# Patient Record
Sex: FEMALE | Race: WHITE | Employment: OTHER | URBAN - METROPOLITAN AREA
[De-identification: names, ages, dates, MRNs, and addresses within clinical notes are randomized per-mention and may not be internally consistent; named-entity substitution may affect disease eponyms.]

---

## 2017-01-05 ENCOUNTER — HOSPITAL ENCOUNTER (OUTPATIENT)
Dept: MAMMOGRAPHY | Facility: HOSPITAL | Age: 72
Discharge: HOME | End: 2017-01-05
Attending: INTERNAL MEDICINE
Payer: MEDICARE

## 2017-01-05 DIAGNOSIS — Z12.39 ENCOUNTER FOR SCREENING FOR MALIGNANT NEOPLASM OF BREAST: ICD-10-CM

## 2017-01-05 DIAGNOSIS — Z12.31 VISIT FOR SCREENING MAMMOGRAM: ICD-10-CM

## 2017-01-05 PROCEDURE — 77067 SCR MAMMO BI INCL CAD: CPT

## 2017-02-07 RX ORDER — METOPROLOL TARTRATE 50 MG/1
TABLET, FILM COATED ORAL
Qty: 180 TABLET | Refills: 0 | OUTPATIENT
Start: 2017-02-07

## 2017-02-07 RX ORDER — HYDROCHLOROTHIAZIDE 12.5 MG/1
TABLET ORAL
Qty: 90 TABLET | Refills: 0 | OUTPATIENT
Start: 2017-02-07

## 2017-02-20 ENCOUNTER — PATIENT MESSAGE (OUTPATIENT)
Dept: RHEUMATOLOGY | Facility: CLINIC | Age: 72
End: 2017-02-20

## 2017-02-21 ENCOUNTER — PATIENT MESSAGE (OUTPATIENT)
Dept: INTERNAL MEDICINE CLINIC | Facility: CLINIC | Age: 72
End: 2017-02-21

## 2017-02-21 NOTE — TELEPHONE ENCOUNTER
Please see below and advise. LOV 12/12/16:    Summary:  1. Cont. leflunomide 10mg ad ay    2. Plan to switch to sulfasalazine 1000mg twice a day - in march - will give script to you -    3.  Check labs every 3 months -    4. Return to clinic in 4-6 month

## 2017-02-21 NOTE — TELEPHONE ENCOUNTER
From: Patricia Welch  To: Melinda Hanson MD  Sent: 2/20/2017 5:40 PM CST  Subject: Prescription Question    It's time to order Sulfasalazine for me. You were going to increase the dosage because it's not as strong as the Leflunomide. Thank you.

## 2017-02-22 RX ORDER — SULFASALAZINE 500 MG/1
1000 TABLET ORAL 2 TIMES DAILY
Qty: 360 TABLET | Refills: 1 | Status: SHIPPED | OUTPATIENT
Start: 2017-02-22 | End: 2017-05-23

## 2017-02-24 RX ORDER — HYDROCHLOROTHIAZIDE 12.5 MG/1
TABLET ORAL
Qty: 90 TABLET | Refills: 0 | Status: SHIPPED | OUTPATIENT
Start: 2017-02-24 | End: 2017-06-02

## 2017-02-24 RX ORDER — METOPROLOL TARTRATE 50 MG/1
50 TABLET, FILM COATED ORAL 2 TIMES DAILY
Qty: 180 TABLET | Refills: 0 | Status: SHIPPED | OUTPATIENT
Start: 2017-02-24 | End: 2017-06-02

## 2017-02-24 NOTE — TELEPHONE ENCOUNTER
From: Princess Napier  To:  Eldon Benites MD  Sent: 2/21/2017 5:40 PM CST  Subject: Prescription Question    I received a letter from [facility] denying my 90 day prescription renewals for  Hydrochlorothiazide 12.5 mg tabs  Metformin 500 mg and  Metoprolol T

## 2017-02-24 NOTE — TELEPHONE ENCOUNTER
Hypertensive Medications  Protocol Criteria:  · Appointment scheduled in the past 6 months or in the next 3 months  · BMP or CMP in the past 12 months  · Creatinine result < 2  Recent Visits       Provider Department Primary Dx    2 months ago Alejandro Sheth Lab Results  Component Value Date   CREATSERUM 0.92 12/02/2016   Refilled per office protocol.

## 2017-05-25 ENCOUNTER — OFFICE VISIT (OUTPATIENT)
Dept: RHEUMATOLOGY | Facility: CLINIC | Age: 72
End: 2017-05-25

## 2017-05-25 VITALS
HEART RATE: 79 BPM | DIASTOLIC BLOOD PRESSURE: 81 MMHG | BODY MASS INDEX: 31.63 KG/M2 | SYSTOLIC BLOOD PRESSURE: 155 MMHG | WEIGHT: 146.63 LBS | HEIGHT: 57 IN

## 2017-05-25 DIAGNOSIS — Z51.81 THERAPEUTIC DRUG MONITORING: ICD-10-CM

## 2017-05-25 DIAGNOSIS — M06.9 RHEUMATOID ARTHRITIS INVOLVING MULTIPLE SITES, UNSPECIFIED RHEUMATOID FACTOR PRESENCE: Primary | ICD-10-CM

## 2017-05-25 PROCEDURE — 99214 OFFICE O/P EST MOD 30 MIN: CPT | Performed by: INTERNAL MEDICINE

## 2017-05-25 PROCEDURE — G0463 HOSPITAL OUTPT CLINIC VISIT: HCPCS | Performed by: INTERNAL MEDICINE

## 2017-05-25 RX ORDER — SENNOSIDES 8.6 MG
650 CAPSULE ORAL 3 TIMES DAILY
COMMUNITY
End: 2019-01-14

## 2017-05-25 NOTE — PROGRESS NOTES
Perfecto Trevizo is a 80year old female. HPI:   Patient presents with:  Rheumatoid Arthritis  Neck Pain  Hand Pain  Arm Pain      I had the pleasure of seeing Odilon Oates on 5/25/2017. I had last seen her on 12/12/2016. I had last here on  3/23/2016.  I stiffness. It takes a whiel somtesim. It's not severe. No flares ups. She's been on leflunomide 10mg a day. She may need to switch b/c it will be more expensive. She's doing well. 5/25/2017  She tried ssz - but it was so nauseated with it.  She to Oral Tab CR Take 650 mg by mouth every 8 (eight) hours as needed. Disp:  Rfl:    Metoprolol Tartrate 50 MG Oral Tab Take 1 tablet (50 mg total) by mouth 2 (two) times daily.  Disp: 180 tablet Rfl: 0   MetFORMIN HCl 500 MG Oral Tab Take 2 tablet PO in the am mg/dL  9    CREATININE      0.50-1.50 mg/dL 0.92 0.90 0.90   BUN/CREA RATIO      10.0-20.0 10.9 10.0    ANION GAP      0-18 7 10    CALCIUM      8.5-10.5 mg/dL 8.8 9.1    CALCULATED OSMOLALITY      275-295 mOsm/kg 290 295    AST      15 - 41 U/L  19 19   A HDL      <130 mg/dL   152 (H)   CHOLESTEROL, TOTAL      110-200 mg/dL 219 (H)     Triglycerides      1-149 mg/dL 152 (H)     NON HDL CHOL      <130 mg/dL 157 (H)     LDL Cholesterol Calc      0-99 mg/dL 127 (H)     CREATININE UR RANDOM       197.1     MALB

## 2017-05-25 NOTE — PATIENT INSTRUCTIONS
1. Try sulfasalazine 500mg twice a day x 4 weeks,   2. Check labs in 4-6 weeks before appt. 3. If feeling ok can increase sulfasalazine to 500mg three times a day   4. Return to clinic in 4-6 weeks.    5. Look up hydroxychlroquine 200mg twice a day expens

## 2017-06-02 ENCOUNTER — OFFICE VISIT (OUTPATIENT)
Dept: INTERNAL MEDICINE CLINIC | Facility: CLINIC | Age: 72
End: 2017-06-02

## 2017-06-02 VITALS
HEART RATE: 60 BPM | BODY MASS INDEX: 28.83 KG/M2 | HEIGHT: 59 IN | TEMPERATURE: 98 F | DIASTOLIC BLOOD PRESSURE: 80 MMHG | WEIGHT: 143 LBS | SYSTOLIC BLOOD PRESSURE: 146 MMHG

## 2017-06-02 DIAGNOSIS — Z95.1 S/P CABG X 3: ICD-10-CM

## 2017-06-02 DIAGNOSIS — Z76.89 ENCOUNTER TO ESTABLISH CARE: Primary | ICD-10-CM

## 2017-06-02 DIAGNOSIS — E11.9 DIABETES MELLITUS TYPE 2 WITHOUT RETINOPATHY (HCC): ICD-10-CM

## 2017-06-02 DIAGNOSIS — M06.00 RHEUMATOID ARTHRITIS WITH NEGATIVE RHEUMATOID FACTOR, INVOLVING UNSPECIFIED SITE (HCC): ICD-10-CM

## 2017-06-02 DIAGNOSIS — L30.4 INTERTRIGO: ICD-10-CM

## 2017-06-02 DIAGNOSIS — I10 ESSENTIAL HYPERTENSION: ICD-10-CM

## 2017-06-02 DIAGNOSIS — E78.2 HYPERLIPIDEMIA, MIXED: ICD-10-CM

## 2017-06-02 PROCEDURE — G0009 ADMIN PNEUMOCOCCAL VACCINE: HCPCS | Performed by: INTERNAL MEDICINE

## 2017-06-02 PROCEDURE — 99214 OFFICE O/P EST MOD 30 MIN: CPT | Performed by: INTERNAL MEDICINE

## 2017-06-02 PROCEDURE — G0463 HOSPITAL OUTPT CLINIC VISIT: HCPCS | Performed by: INTERNAL MEDICINE

## 2017-06-02 PROCEDURE — 90670 PCV13 VACCINE IM: CPT | Performed by: INTERNAL MEDICINE

## 2017-06-02 RX ORDER — METOPROLOL TARTRATE 50 MG/1
50 TABLET, FILM COATED ORAL 2 TIMES DAILY
Qty: 180 TABLET | Refills: 3 | Status: SHIPPED | OUTPATIENT
Start: 2017-06-02 | End: 2018-07-05

## 2017-06-02 RX ORDER — SULFASALAZINE 500 MG/1
500 TABLET ORAL 2 TIMES DAILY
Refills: 0 | COMMUNITY
Start: 2017-06-02 | End: 2017-08-08

## 2017-06-02 RX ORDER — HYDROCHLOROTHIAZIDE 12.5 MG/1
TABLET ORAL
Qty: 90 TABLET | Refills: 3 | Status: SHIPPED | OUTPATIENT
Start: 2017-06-02 | End: 2018-05-17

## 2017-06-02 RX ORDER — ACYCLOVIR 400 MG/1
400 TABLET ORAL 3 TIMES DAILY
Qty: 21 TABLET | Refills: 3 | COMMUNITY
Start: 2017-06-02 | End: 2021-09-20 | Stop reason: ALTCHOICE

## 2017-06-02 NOTE — PROGRESS NOTES
Perfecto Trevizo is a 80year old female who presents for     Establish care  Changing from Dr Tito Lora who just retired. Overall feeling ok. Diabetes: home sugars are usually 80s to 130s. HTN; home BPs are not checked. CAD--s/p CABG in 2007.  Faby Hernandez at [facility] for [address] [place] [address].        Allergies:  No Known Allergies    Review of systems:  Constitutional:  No fever, loss of appetite or unintentional weight loss  Respiratory: No cough, wheezing or dyspnea  Cardiac: No chest pain or palpitations  Gastrointestinal: No a sulfasalazine. Healthcare maintenance:  Vaccines-pneumovax 23 was already given by Dr. Haylee Unger per pt, Prevnar today. Shingles vaccine-was in 4/2011. Colonoscopy recom (has never had)--she will see Dr Anne-Marie Solomon who Dr. Haylee Unger recom. Mammo 1/5/17-ok.    dex

## 2017-06-06 ENCOUNTER — TELEPHONE (OUTPATIENT)
Dept: INTERNAL MEDICINE CLINIC | Facility: CLINIC | Age: 72
End: 2017-06-06

## 2017-06-06 NOTE — TELEPHONE ENCOUNTER
DM supply form completed and faxed back to [address] [place] [address] supplies @ [phone], conformation received. Original sent to scan. Received completed form back from [address] [place] [address] supply, called to verify they have everything they need from us.  Per Dea Fleming nothing fur

## 2017-06-06 NOTE — TELEPHONE ENCOUNTER
To nursing,   I completed form completed for [place] medical supply[facility] for diabetes supply. Form is my out pile. Please fax. Thanks.

## 2017-06-07 ENCOUNTER — TELEPHONE (OUTPATIENT)
Dept: INTERNAL MEDICINE CLINIC | Facility: CLINIC | Age: 72
End: 2017-06-07

## 2017-06-07 NOTE — TELEPHONE ENCOUNTER
To nursing,   please clarify to [address] that Dr. Nithya Aguilar has retired and pt has now established here as my pt. (this is a different office than Dr. Henna Winston office). Please cancel her metformin Rx from Dr Nithya Aguilar.    And please use my rx-- Metformin

## 2017-06-07 NOTE — TELEPHONE ENCOUNTER
[facility] and relayed DR. MASSEY message - he verbalized understanding. Already have DR. MASSEY RX and he will cancel RX from DR. Amol Alves

## 2017-06-07 NOTE — TELEPHONE ENCOUNTER
Per [facility] pharmacy fax [phone]  Clarification needed for Rx metformin.  Have active Rx through DR Rachel Gama for 500mg  Dr Marcus Garrett prescribed 1000mg  To Rx and in purple folder

## 2017-06-26 ENCOUNTER — PATIENT MESSAGE (OUTPATIENT)
Dept: RHEUMATOLOGY | Facility: CLINIC | Age: 72
End: 2017-06-26

## 2017-06-26 NOTE — TELEPHONE ENCOUNTER
From: Richie Valle  To: Cathy Thibodeaux MD  Sent: 6/26/2017 1:44 PM CDT  Subject: Prescription Question    I've been taking 4 sulfasalazine pills a day for a couple of weeks now. I still have a lot of pain.  I think I'd like to go back to Leflunomide

## 2017-06-28 RX ORDER — LEFLUNOMIDE 10 MG/1
10 TABLET ORAL DAILY
Qty: 90 TABLET | Refills: 0 | Status: SHIPPED | OUTPATIENT
Start: 2017-06-28 | End: 2017-09-18

## 2017-07-28 ENCOUNTER — LAB ENCOUNTER (OUTPATIENT)
Dept: LAB | Age: 72
End: 2017-07-28
Attending: INTERNAL MEDICINE
Payer: MEDICARE

## 2017-07-28 ENCOUNTER — TELEPHONE (OUTPATIENT)
Dept: INTERNAL MEDICINE CLINIC | Facility: CLINIC | Age: 72
End: 2017-07-28

## 2017-07-28 DIAGNOSIS — D64.9 MILD ANEMIA: ICD-10-CM

## 2017-07-28 DIAGNOSIS — M06.9 RHEUMATOID ARTHRITIS INVOLVING MULTIPLE SITES, UNSPECIFIED RHEUMATOID FACTOR PRESENCE: ICD-10-CM

## 2017-07-28 DIAGNOSIS — R77.1 ELEVATED SERUM GLOBULIN LEVEL: ICD-10-CM

## 2017-07-28 DIAGNOSIS — Z51.81 THERAPEUTIC DRUG MONITORING: ICD-10-CM

## 2017-07-28 DIAGNOSIS — D64.9 MILD ANEMIA: Primary | ICD-10-CM

## 2017-07-28 DIAGNOSIS — E11.9 DIABETES MELLITUS TYPE 2 WITHOUT RETINOPATHY (HCC): ICD-10-CM

## 2017-07-28 LAB
ALBUMIN SERPL BCP-MCNC: 3.2 G/DL (ref 3.5–4.8)
ALBUMIN/GLOB SERPL: 0.7 {RATIO} (ref 1–2)
ALP SERPL-CCNC: 62 U/L (ref 32–100)
ALT SERPL-CCNC: 11 U/L (ref 14–54)
ANION GAP SERPL CALC-SCNC: 11 MMOL/L (ref 0–18)
AST SERPL-CCNC: 17 U/L (ref 15–41)
BACTERIA UR QL AUTO: NEGATIVE /HPF
BASOPHILS # BLD: 0.1 K/UL (ref 0–0.2)
BASOPHILS NFR BLD: 1 %
BILIRUB SERPL-MCNC: 0.4 MG/DL (ref 0.3–1.2)
BILIRUB UR QL: NEGATIVE
BUN SERPL-MCNC: 13 MG/DL (ref 8–20)
BUN/CREAT SERPL: 14.9 (ref 10–20)
CALCIUM SERPL-MCNC: 9 MG/DL (ref 8.5–10.5)
CHLORIDE SERPL-SCNC: 103 MMOL/L (ref 95–110)
CHOLEST SERPL-MCNC: 139 MG/DL (ref 110–200)
CO2 SERPL-SCNC: 26 MMOL/L (ref 22–32)
COLOR UR: YELLOW
CREAT SERPL-MCNC: 0.87 MG/DL (ref 0.5–1.5)
CREAT UR-MCNC: 175.6 MG/DL
CRP SERPL-MCNC: 1.9 MG/DL (ref 0–0.9)
EOSINOPHIL # BLD: 0.2 K/UL (ref 0–0.7)
EOSINOPHIL NFR BLD: 2 %
ERYTHROCYTE [DISTWIDTH] IN BLOOD BY AUTOMATED COUNT: 17.3 % (ref 11–15)
ERYTHROCYTE [SEDIMENTATION RATE] IN BLOOD: 76 MM/HR (ref 0–30)
FERRITIN SERPL IA-MCNC: 15 NG/ML (ref 11–307)
GLOBULIN PLAS-MCNC: 4.4 G/DL (ref 2.5–3.7)
GLUCOSE SERPL-MCNC: 131 MG/DL (ref 70–99)
GLUCOSE UR-MCNC: NEGATIVE MG/DL
HBA1C MFR BLD: 6.6 % (ref 4–6)
HCT VFR BLD AUTO: 33.8 % (ref 35–48)
HDLC SERPL-MCNC: 54 MG/DL
HGB BLD-MCNC: 10.9 G/DL (ref 12–16)
HGB UR QL STRIP.AUTO: NEGATIVE
HYALINE CASTS #/AREA URNS AUTO: 3 /LPF
IRON SATN MFR SERPL: 8 % (ref 15–50)
IRON SERPL-MCNC: 31 MCG/DL (ref 28–170)
KETONES UR-MCNC: NEGATIVE MG/DL
LDLC SERPL CALC-MCNC: 53 MG/DL (ref 0–99)
LYMPHOCYTES # BLD: 2 K/UL (ref 1–4)
LYMPHOCYTES NFR BLD: 22 %
MCH RBC QN AUTO: 26.5 PG (ref 27–32)
MCHC RBC AUTO-ENTMCNC: 32.3 G/DL (ref 32–37)
MCV RBC AUTO: 82 FL (ref 80–100)
MICROALBUMIN UR-MCNC: 4.7 MG/DL (ref 0–1.8)
MICROALBUMIN/CREAT UR: 26.8 MG/G{CREAT} (ref 0–20)
MONOCYTES # BLD: 1 K/UL (ref 0–1)
MONOCYTES NFR BLD: 11 %
NEUTROPHILS # BLD AUTO: 5.9 K/UL (ref 1.8–7.7)
NEUTROPHILS NFR BLD: 65 %
NITRITE UR QL STRIP.AUTO: NEGATIVE
NONHDLC SERPL-MCNC: 85 MG/DL
OSMOLALITY UR CALC.SUM OF ELEC: 292 MOSM/KG (ref 275–295)
PH UR: 5 [PH] (ref 5–8)
PLATELET # BLD AUTO: 404 K/UL (ref 140–400)
PMV BLD AUTO: 8.2 FL (ref 7.4–10.3)
POTASSIUM SERPL-SCNC: 3.9 MMOL/L (ref 3.3–5.1)
PROT SERPL-MCNC: 7.6 G/DL (ref 5.9–8.4)
PROT UR-MCNC: NEGATIVE MG/DL
RBC # BLD AUTO: 4.12 M/UL (ref 3.7–5.4)
RBC #/AREA URNS AUTO: <1 /HPF
SODIUM SERPL-SCNC: 140 MMOL/L (ref 136–144)
SP GR UR STRIP: 1.03 (ref 1–1.03)
TIBC SERPL-MCNC: 374 MCG/DL (ref 228–428)
TRANSFERRIN SERPL-MCNC: 283 MG/DL (ref 192–382)
TRIGL SERPL-MCNC: 161 MG/DL (ref 1–149)
TSH SERPL-ACNC: 2.91 UIU/ML (ref 0.45–5.33)
UROBILINOGEN UR STRIP-ACNC: <2
VIT B12 SERPL-MCNC: 126 PG/ML (ref 181–914)
VIT C UR-MCNC: NEGATIVE MG/DL
WBC # BLD AUTO: 9.2 K/UL (ref 4–11)
WBC #/AREA URNS AUTO: 3 /HPF

## 2017-07-28 PROCEDURE — 86334 IMMUNOFIX E-PHORESIS SERUM: CPT

## 2017-07-28 PROCEDURE — 82043 UR ALBUMIN QUANTITATIVE: CPT

## 2017-07-28 PROCEDURE — 36415 COLL VENOUS BLD VENIPUNCTURE: CPT

## 2017-07-28 PROCEDURE — 82570 ASSAY OF URINE CREATININE: CPT

## 2017-07-28 PROCEDURE — 84165 PROTEIN E-PHORESIS SERUM: CPT

## 2017-07-28 PROCEDURE — 82607 VITAMIN B-12: CPT

## 2017-07-28 PROCEDURE — 84443 ASSAY THYROID STIM HORMONE: CPT

## 2017-07-28 PROCEDURE — 85652 RBC SED RATE AUTOMATED: CPT

## 2017-07-28 PROCEDURE — 86140 C-REACTIVE PROTEIN: CPT

## 2017-07-28 PROCEDURE — 81001 URINALYSIS AUTO W/SCOPE: CPT | Performed by: INTERNAL MEDICINE

## 2017-07-28 PROCEDURE — 85025 COMPLETE CBC W/AUTO DIFF WBC: CPT

## 2017-07-28 PROCEDURE — 80053 COMPREHEN METABOLIC PANEL: CPT

## 2017-07-28 PROCEDURE — 83540 ASSAY OF IRON: CPT

## 2017-07-28 PROCEDURE — 82728 ASSAY OF FERRITIN: CPT

## 2017-07-28 PROCEDURE — 84466 ASSAY OF TRANSFERRIN: CPT

## 2017-07-28 PROCEDURE — 80061 LIPID PANEL: CPT

## 2017-07-28 PROCEDURE — 83036 HEMOGLOBIN GLYCOSYLATED A1C: CPT

## 2017-07-28 NOTE — TELEPHONE ENCOUNTER
To nursing, please tell pt lab done on 7/28/17 CBC CMP TSH lipid UA/MA, D7w---uzrizfc ok except shows a mild chronic anemia with Hgb 10.9 and elevated serum globulin 4.4--may be insignificant.   I added some additional lab to existing specimen in lab-- SPE/

## 2017-08-01 LAB
ALBUMIN SERPL ELPH-MCNC: 3.6 G/DL (ref 3.8–5.8)
ALBUMIN/GLOB SERPL: 0.92 {RATIO} (ref 1–2)
ALPHA1 GLOB SERPL ELPH-MCNC: 0.29 G/DL (ref 0.1–0.3)
ALPHA2 GLOB SERPL ELPH-MCNC: 1.25 G/DL (ref 0.6–1)
B-GLOBULIN SERPL ELPH-MCNC: 1.25 G/DL (ref 0.7–1.3)
GAMMA GLOB SERPL ELPH-MCNC: 1.11 G/DL (ref 0.5–1.7)
TOTAL PROTEIN (SPECIAL TESTING): 7.5 G/DL (ref 6.5–9.1)

## 2017-08-08 ENCOUNTER — OFFICE VISIT (OUTPATIENT)
Dept: INTERNAL MEDICINE CLINIC | Facility: CLINIC | Age: 72
End: 2017-08-08

## 2017-08-08 VITALS
SYSTOLIC BLOOD PRESSURE: 120 MMHG | DIASTOLIC BLOOD PRESSURE: 70 MMHG | WEIGHT: 141 LBS | TEMPERATURE: 98 F | BODY MASS INDEX: 28.43 KG/M2 | HEIGHT: 59 IN | HEART RATE: 68 BPM

## 2017-08-08 DIAGNOSIS — E61.1 IRON DEFICIENCY: ICD-10-CM

## 2017-08-08 DIAGNOSIS — E78.2 HYPERLIPIDEMIA, MIXED: ICD-10-CM

## 2017-08-08 DIAGNOSIS — E53.8 VITAMIN B12 DEFICIENCY: ICD-10-CM

## 2017-08-08 DIAGNOSIS — R77.1 ELEVATED SERUM GLOBULIN LEVEL: ICD-10-CM

## 2017-08-08 DIAGNOSIS — D64.9 MILD ANEMIA: Primary | ICD-10-CM

## 2017-08-08 DIAGNOSIS — E11.9 DIABETES MELLITUS TYPE 2 WITHOUT RETINOPATHY (HCC): ICD-10-CM

## 2017-08-08 DIAGNOSIS — I10 ESSENTIAL HYPERTENSION: ICD-10-CM

## 2017-08-08 PROCEDURE — G0463 HOSPITAL OUTPT CLINIC VISIT: HCPCS | Performed by: INTERNAL MEDICINE

## 2017-08-08 PROCEDURE — 96372 THER/PROPH/DIAG INJ SC/IM: CPT | Performed by: INTERNAL MEDICINE

## 2017-08-08 PROCEDURE — 99214 OFFICE O/P EST MOD 30 MIN: CPT | Performed by: INTERNAL MEDICINE

## 2017-08-08 RX ORDER — GLIMEPIRIDE 2 MG/1
1 TABLET ORAL
Qty: 90 TABLET | Refills: 3 | COMMUNITY
Start: 2017-08-08 | End: 2017-12-18

## 2017-08-08 RX ORDER — FERROUS SULFATE 325(65) MG
325 TABLET ORAL DAILY
Qty: 1 TABLET | Refills: 0 | COMMUNITY
Start: 2017-08-08 | End: 2017-09-12

## 2017-08-08 RX ORDER — CYANOCOBALAMIN 1000 UG/ML
1000 INJECTION INTRAMUSCULAR; SUBCUTANEOUS ONCE
Status: COMPLETED | OUTPATIENT
Start: 2017-08-08 | End: 2017-08-08

## 2017-08-08 RX ORDER — CYANOCOBALAMIN 1000 UG/ML
1000 INJECTION INTRAMUSCULAR; SUBCUTANEOUS WEEKLY
Qty: 1 VIAL | Refills: 0 | COMMUNITY
Start: 2017-08-08 | End: 2017-08-23

## 2017-08-08 RX ADMIN — CYANOCOBALAMIN 1000 MCG: 1000 INJECTION INTRAMUSCULAR; SUBCUTANEOUS at 12:25:00

## 2017-08-08 NOTE — PROGRESS NOTES
Danis Holguin is a 80year old female who presents for     Check at 2 mo.     Diabetes: home sugars 80s last few days--can be up to 120. Had 64 a few d ago. Did ok with cutting back metformin 1000 mg bid as instructed at June visit. A1c 6.6 on 7/28/17. Relation Age of Onset   • Macular degeneration Mother    •  age 80 old age [Other] [OTHER] Mother    • Heart Disease Father 67     CAD; Cause of death   • Stroke Sister 46     CVA   • Cancer Maternal Aunt [de-identified]     pancreatic   • 1 brother, 2 sisters.  [Ot Lab 7/28/17-Hgb 10.9, Fe sat low at 8% with low nl ferritin of 15. (hgb was 10.3 on 3/10/16). Vit B12 level low at 126. I recom proceed with colonoscopy with Dr Flaco Singh and see her for anemia. Add Rx ferrous sulfate 65 mg daily.   Vit B12 1000 mcg IM t (10 mg total) by mouth daily. Disp: 90 tablet Rfl: 0   MetFORMIN HCl 1000 MG Oral Tab Take 1 tablet (1,000 mg total) by mouth 2 (two) times daily with meals.  Disp: 180 tablet Rfl: 3   hydrochlorothiazide 12.5 MG Oral Tab Take 1 tablet by mouth  daily Disp:

## 2017-08-15 ENCOUNTER — NURSE ONLY (OUTPATIENT)
Dept: INTERNAL MEDICINE CLINIC | Facility: CLINIC | Age: 72
End: 2017-08-15

## 2017-08-15 DIAGNOSIS — D64.9 ANEMIA, UNSPECIFIED TYPE: Primary | ICD-10-CM

## 2017-08-15 PROCEDURE — 96372 THER/PROPH/DIAG INJ SC/IM: CPT | Performed by: INTERNAL MEDICINE

## 2017-08-15 RX ORDER — CYANOCOBALAMIN 1000 UG/ML
1000 INJECTION INTRAMUSCULAR; SUBCUTANEOUS ONCE
Status: COMPLETED | OUTPATIENT
Start: 2017-08-15 | End: 2017-08-15

## 2017-08-15 RX ADMIN — CYANOCOBALAMIN 1000 MCG: 1000 INJECTION INTRAMUSCULAR; SUBCUTANEOUS at 13:58:00

## 2017-08-22 ENCOUNTER — NURSE ONLY (OUTPATIENT)
Dept: INTERNAL MEDICINE CLINIC | Facility: CLINIC | Age: 72
End: 2017-08-22

## 2017-08-22 DIAGNOSIS — E53.8 B12 DEFICIENCY: Primary | ICD-10-CM

## 2017-08-22 PROCEDURE — 96372 THER/PROPH/DIAG INJ SC/IM: CPT | Performed by: INTERNAL MEDICINE

## 2017-08-22 RX ORDER — CYANOCOBALAMIN 1000 UG/ML
1000 INJECTION INTRAMUSCULAR; SUBCUTANEOUS ONCE
Status: COMPLETED | OUTPATIENT
Start: 2017-08-22 | End: 2017-08-22

## 2017-08-22 RX ADMIN — CYANOCOBALAMIN 1000 MCG: 1000 INJECTION INTRAMUSCULAR; SUBCUTANEOUS at 11:21:00

## 2017-08-23 ENCOUNTER — TELEPHONE (OUTPATIENT)
Dept: GASTROENTEROLOGY | Facility: CLINIC | Age: 72
End: 2017-08-23

## 2017-08-23 ENCOUNTER — OFFICE VISIT (OUTPATIENT)
Dept: GASTROENTEROLOGY | Facility: CLINIC | Age: 72
End: 2017-08-23

## 2017-08-23 VITALS
HEART RATE: 88 BPM | DIASTOLIC BLOOD PRESSURE: 76 MMHG | SYSTOLIC BLOOD PRESSURE: 134 MMHG | HEIGHT: 59 IN | TEMPERATURE: 98 F | WEIGHT: 141.81 LBS | BODY MASS INDEX: 28.59 KG/M2 | OXYGEN SATURATION: 93 %

## 2017-08-23 DIAGNOSIS — D64.9 ANEMIA, UNSPECIFIED TYPE: Primary | ICD-10-CM

## 2017-08-23 PROCEDURE — G0463 HOSPITAL OUTPT CLINIC VISIT: HCPCS | Performed by: PHYSICIAN ASSISTANT

## 2017-08-23 PROCEDURE — 99204 OFFICE O/P NEW MOD 45 MIN: CPT | Performed by: PHYSICIAN ASSISTANT

## 2017-08-23 RX ORDER — OMEPRAZOLE 40 MG/1
40 CAPSULE, DELAYED RELEASE ORAL DAILY
Qty: 90 CAPSULE | Refills: 0 | Status: SHIPPED | OUTPATIENT
Start: 2017-08-23 | End: 2017-08-23 | Stop reason: RX

## 2017-08-23 RX ORDER — OMEPRAZOLE 40 MG/1
40 CAPSULE, DELAYED RELEASE ORAL DAILY
Qty: 90 CAPSULE | Refills: 0 | Status: SHIPPED | OUTPATIENT
Start: 2017-08-23 | End: 2017-09-12

## 2017-08-23 NOTE — TELEPHONE ENCOUNTER
Colleen Conn already called [facility] mail order pharmacy and spoke to [place] to cancel colyte and omeprazole. Could you please send to [place] per Martin Letters request below.  Silvanayulimaura Juan Miguel has not even received colyte rx yet so we need to make sure it's sent to co

## 2017-08-23 NOTE — TELEPHONE ENCOUNTER
Resolved. Thank you! Jasiel Skelton, if everything has gone through, would you sign the encounter/close it? Or re-route and I will - did not want to close preemptively.

## 2017-08-23 NOTE — TELEPHONE ENCOUNTER
Pt requesting RN to send Omeprazole and preps rx for CLN to [facility] on [address]. Pls call. Thank you.

## 2017-08-23 NOTE — TELEPHONE ENCOUNTER
PLEASE DO NOT CLOSE ENC    These Rx's were sent in error to mailorder. Dottyverona Randolph stated they would cancel omeprazole but they did not receive the bowel prep yet. Therefore could not leave a note or cancel. We would have to call back.      I called pharm

## 2017-08-23 NOTE — H&P
[address] Gastroenterology                                                                                                  Clinic History and Physical     Pa Lives with    - Daily NSAIDs and 325 ASA    History, Medications, Allergies, ROS:      Past Medical History:   Diagnosis Date   • Amblyopia 1951    OD   • Anemia 07/2017    mixed iron and B12 deficiency   • CAD (coronary artery disease) 2007    acut tablet Rfl: 3   Ferrous Sulfate 325 (65 Fe) MG Oral Tab Take 1 tablet (325 mg total) by mouth daily. Disp: 1 tablet Rfl: 0   leflunomide 10 MG Oral Tab Take 1 tablet (10 mg total) by mouth daily.  Disp: 90 tablet Rfl: 0   MetFORMIN HCl 1000 MG Oral Tab Take comfortable and in no acute discomfort  HEENT: conjunctiva pink, the sclera appears anicteric, oropharynx clear, mucus membranes appear moist  CV: regular rate and rhythm, the extremities are warm and well perfused   Chest: moves air well; no labored breat pain, weight loss, et Haritha El. No red flag symptoms presently. Works out at [employer] and does [place] three times a week. #Hx CABG: no other blood thinners/anti-platelets/anti-coagulants other than   #Moderate NSAID use:  May discuss with PC discussed the risks, benefits, and alternatives to upper endoscopy/enteroscopy with the patient [who demonstrated understanding], including but not limited to the risks of bleeding, infection, pain, as well as the risks of anesthesia and perforation all le

## 2017-08-23 NOTE — TELEPHONE ENCOUNTER
Scheduled for:  Colonoscopy - [address], EGD - [address]  Provider Name:  Dr. Cheri Mcnally  Date:  8/29/17  Location:  [facility]  Sedation:  MAC  Time:  1:15 pm (pt is aware to arrive at 12:15 pm)  Prep:  Colyte, prep instructions were given to pt in the office, pt verbali

## 2017-08-24 NOTE — TELEPHONE ENCOUNTER
Left msg for pt letting her know that her procedure on 8/29/17 with Dr. Isaac Johnosn was moved to 1:15 pm instead of 1:00 pm, also told pt to arrive at 12:15 pm.

## 2017-08-29 ENCOUNTER — ANESTHESIA (OUTPATIENT)
Dept: ENDOSCOPY | Facility: HOSPITAL | Age: 72
End: 2017-08-29
Payer: MEDICARE

## 2017-08-29 ENCOUNTER — SURGERY (OUTPATIENT)
Age: 72
End: 2017-08-29

## 2017-08-29 ENCOUNTER — ANESTHESIA EVENT (OUTPATIENT)
Dept: ENDOSCOPY | Facility: HOSPITAL | Age: 72
End: 2017-08-29
Payer: MEDICARE

## 2017-08-29 ENCOUNTER — HOSPITAL ENCOUNTER (OUTPATIENT)
Facility: HOSPITAL | Age: 72
Setting detail: HOSPITAL OUTPATIENT SURGERY
Discharge: HOME | End: 2017-08-29
Attending: INTERNAL MEDICINE | Admitting: INTERNAL MEDICINE
Payer: MEDICARE

## 2017-08-29 DIAGNOSIS — D64.9 ANEMIA, UNSPECIFIED TYPE: Primary | ICD-10-CM

## 2017-08-29 PROCEDURE — G0121 COLON CA SCRN NOT HI RSK IND: HCPCS | Performed by: INTERNAL MEDICINE

## 2017-08-29 PROCEDURE — 0DJD8ZZ INSPECTION OF LOWER INTESTINAL TRACT, VIA NATURAL OR ARTIFICIAL OPENING ENDOSCOPIC: ICD-10-PCS | Performed by: INTERNAL MEDICINE

## 2017-08-29 PROCEDURE — 43239 EGD BIOPSY SINGLE/MULTIPLE: CPT | Performed by: INTERNAL MEDICINE

## 2017-08-29 PROCEDURE — 0DB98ZX EXCISION OF DUODENUM, VIA NATURAL OR ARTIFICIAL OPENING ENDOSCOPIC, DIAGNOSTIC: ICD-10-PCS | Performed by: INTERNAL MEDICINE

## 2017-08-29 PROCEDURE — 0DB68ZX EXCISION OF STOMACH, VIA NATURAL OR ARTIFICIAL OPENING ENDOSCOPIC, DIAGNOSTIC: ICD-10-PCS | Performed by: INTERNAL MEDICINE

## 2017-08-29 RX ORDER — NALOXONE HYDROCHLORIDE 0.4 MG/ML
80 INJECTION, SOLUTION INTRAMUSCULAR; INTRAVENOUS; SUBCUTANEOUS AS NEEDED
Status: DISCONTINUED | OUTPATIENT
Start: 2017-08-29 | End: 2017-08-29

## 2017-08-29 RX ORDER — SODIUM CHLORIDE, SODIUM LACTATE, POTASSIUM CHLORIDE, CALCIUM CHLORIDE 600; 310; 30; 20 MG/100ML; MG/100ML; MG/100ML; MG/100ML
INJECTION, SOLUTION INTRAVENOUS CONTINUOUS PRN
Status: DISCONTINUED | OUTPATIENT
Start: 2017-08-29 | End: 2017-08-29 | Stop reason: SURG

## 2017-08-29 RX ORDER — ONDANSETRON 2 MG/ML
4 INJECTION INTRAMUSCULAR; INTRAVENOUS ONCE AS NEEDED
Status: DISCONTINUED | OUTPATIENT
Start: 2017-08-29 | End: 2017-08-29

## 2017-08-29 RX ORDER — SODIUM CHLORIDE, SODIUM LACTATE, POTASSIUM CHLORIDE, CALCIUM CHLORIDE 600; 310; 30; 20 MG/100ML; MG/100ML; MG/100ML; MG/100ML
INJECTION, SOLUTION INTRAVENOUS CONTINUOUS
Status: DISCONTINUED | OUTPATIENT
Start: 2017-08-29 | End: 2017-08-29

## 2017-08-29 RX ORDER — LIDOCAINE HYDROCHLORIDE 10 MG/ML
INJECTION, SOLUTION EPIDURAL; INFILTRATION; INTRACAUDAL; PERINEURAL AS NEEDED
Status: DISCONTINUED | OUTPATIENT
Start: 2017-08-29 | End: 2017-08-29 | Stop reason: SURG

## 2017-08-29 RX ADMIN — LIDOCAINE HYDROCHLORIDE 50 MG: 10 INJECTION, SOLUTION EPIDURAL; INFILTRATION; INTRACAUDAL; PERINEURAL at 13:32:00

## 2017-08-29 RX ADMIN — SODIUM CHLORIDE, SODIUM LACTATE, POTASSIUM CHLORIDE, CALCIUM CHLORIDE: 600; 310; 30; 20 INJECTION, SOLUTION INTRAVENOUS at 14:20:00

## 2017-08-29 RX ADMIN — SODIUM CHLORIDE, SODIUM LACTATE, POTASSIUM CHLORIDE, CALCIUM CHLORIDE: 600; 310; 30; 20 INJECTION, SOLUTION INTRAVENOUS at 13:32:00

## 2017-08-29 NOTE — ANESTHESIA POSTPROCEDURE EVALUATION
Patient: Olin Barthel    Procedure Summary     Date:  08/29/17 Room / Location:  [address] [place] [address] ENDOSCOPY 05 / [address] [place] [address] ENDOSCOPY    Anesthesia Start:  2846 Anesthesia Stop:  4032    Procedures:       COLONOSCOPY (N/A )      ESOPHAGOGASTRODUODENOSCOPY (EGD) (N/A ) Diagn

## 2017-08-29 NOTE — ANESTHESIA PREPROCEDURE EVALUATION
Anesthesia PreOp Note    HPI:     Gopi Crandall is a 71year old female who presents for preoperative consultation requested by: Elma Archibald MD    Date of Surgery: 8/29/2017    Procedure(s):  COLONOSCOPY  ESOPHAGOGASTRODUODENOSCOPY (EGD)  Indicati Marie Zhang      Prescriptions Prior to Admission:  Omeprazole 40 MG Oral Capsule Delayed Release Take 1 capsule (40 mg total) by mouth daily.  Take 1 capsule by mouth daily before breakfast. Disp: 90 capsule Rfl: 0 8/29/2017   PEG 3350-KCl-NaBcb-NaCl-Kiarra AppleC Cause of death   • Cancer Maternal Aunt [de-identified]     pancreatic   • Macular degeneration Mother    •  age 80 old age Sharla Paiz Mother    • Stroke Sister 46     CVA   • 1 brother, 2 sisters.  [OTHER] Other    • sciatica [OTHER] Brother    • 2 sons, 1 daughter [O exam  (+) hypertension, CAD, CABG/stent,     Neuro/Psych - negative ROS     GI/Hepatic/Renal      Endo/Other    (+) diabetes mellitus type 2 well controlled,   Abdominal  - normal exam             Anesthesia Plan:   ASA:  3  Plan:   MAC  Post-op Pain Manag

## 2017-08-29 NOTE — H&P
History & Physical Examination    Patient Name: Patricia Welch  MRN: [id number]  [facility]: [id number]  YOB: 1945    Diagnosis: iron deficiency anemia      Prescriptions Prior to Admission:  Omeprazole 40 MG Oral Capsule Delayed Release Take 1 caps History:   Diagnosis Date   • Amblyopia 1951    OD   • Anemia 07/2017    mixed iron and B12 deficiency   • CAD (coronary artery disease) 2007    acute MI and had CABG in 3/2007    • Cataracts, bilateral 2004    OU; sees Dr. Minnie Choudhury   • Coronary atheroscle changes noted above.     Mariola Haro MD  [facility], [facility] - Gastroenterology  8/29/2017  1:05 PM

## 2017-08-29 NOTE — OPERATIVE REPORT
Esophagogastroduodenoscopy (EGD) & Colonoscopy Report    Rusty Caballero     1945 Age 71year old   PCP Rina Candelaria MD Endoscopist Tayla Melissa MD     Date of procedure: 17    Procedure: EGD w/ biopsy single/multiple & Colonoscopy appendix and the ileocecal valve. Withdrawal was begun with thorough washing and careful examination of the colonic walls and folds. Photodocumentation was obtained. The bowel prep was good. Views of the colon were good with washing.  Withdrawal time was 13 medications    >>>If biopsies were performed and you have not received your pathology results either by phone or letter within 2 weeks, please call our office at [phone].       Annie Schulz MD  [facility], [facility] - Gastroenterology  8/29/2017  2:09

## 2017-08-30 ENCOUNTER — TELEPHONE (OUTPATIENT)
Dept: GASTROENTEROLOGY | Facility: CLINIC | Age: 72
End: 2017-08-30

## 2017-08-30 VITALS
WEIGHT: 141 LBS | HEART RATE: 74 BPM | RESPIRATION RATE: 14 BRPM | HEIGHT: 59 IN | BODY MASS INDEX: 28.43 KG/M2 | OXYGEN SATURATION: 96 % | DIASTOLIC BLOOD PRESSURE: 92 MMHG | SYSTOLIC BLOOD PRESSURE: 131 MMHG

## 2017-08-30 RX ORDER — PEG-3350, SODIUM SULFATE, SODIUM CHLORIDE, POTASSIUM CHLORIDE, SODIUM ASCORBATE AND ASCORBIC ACID 7.5-2.691G
KIT ORAL
Qty: 1 EACH | Refills: 0 | Status: SHIPPED | OUTPATIENT
Start: 2017-08-30 | End: 2017-08-31

## 2017-08-30 NOTE — TELEPHONE ENCOUNTER
Called patient and discussed results of EGD which showed unremarkable gastric and duodenal biopsies. Given normal EGD and colonoscopy discussed plan for video capsule endoscopy to evaluate a small bowel source of iron deficiency anemia.     Discussed our

## 2017-08-31 ENCOUNTER — TELEPHONE (OUTPATIENT)
Dept: GASTROENTEROLOGY | Facility: CLINIC | Age: 72
End: 2017-08-31

## 2017-08-31 RX ORDER — POLYETHYLENE GLYCOL 3350, SODIUM CHLORIDE, SODIUM BICARBONATE, POTASSIUM CHLORIDE 420; 11.2; 5.72; 1.48 G/4L; G/4L; G/4L; G/4L
2 POWDER, FOR SOLUTION ORAL ONCE
Qty: 1 BOTTLE | Refills: 0 | Status: SHIPPED | OUTPATIENT
Start: 2017-08-31 | End: 2017-08-31

## 2017-08-31 NOTE — TELEPHONE ENCOUNTER
Pt notified that less expensive RX has been sent to her pharmacy. I reviewed she is ONLY to drink 2L the evening before her procedure, she verbalized understanding.

## 2017-09-05 ENCOUNTER — TELEPHONE (OUTPATIENT)
Dept: GASTROENTEROLOGY | Facility: CLINIC | Age: 72
End: 2017-09-05

## 2017-09-05 ENCOUNTER — PATIENT MESSAGE (OUTPATIENT)
Dept: GASTROENTEROLOGY | Facility: CLINIC | Age: 72
End: 2017-09-05

## 2017-09-05 DIAGNOSIS — D64.9 ANEMIA, UNSPECIFIED TYPE: Primary | ICD-10-CM

## 2017-09-05 NOTE — TELEPHONE ENCOUNTER
Pt contacted and reviewed Dr. Birgit Sosa message below, she verbalized understanding. She rescheduled f/u with PB for after VCE for 9/21/17 @ 9:30am. VCE instructions sent to pt via [place].

## 2017-09-05 NOTE — TELEPHONE ENCOUNTER
BRIAN Peguerosherin Garzastefania   MRN: [id number]   Procedure(s):   CAPSULE ENDOSCOPY   Date: 9/11/2017   Time (includes any set-up time): 0730     Surgeon Start Time:   7:30 AM   Location: [facility] ENDOSCOPY  - [facility] ENDOSCOPY [address]

## 2017-09-05 NOTE — TELEPHONE ENCOUNTER
From: Chidi Neff  To: Chaitanya Nolasco  Sent: 9/5/2017 11:16 AM CDT  Subject: Visit Follow-up Question    I have an appt. with you Thursday 9/7 following my colonoscopy/endoscopy of 8/29.  Dr. Gallo Player didn't find anything and wants to schedule a came

## 2017-09-05 NOTE — TELEPHONE ENCOUNTER
Patient should hold her metformin and glimepiride the day before and day of the procedure and reschedule her follow up visit until after the VCE.     Thank you,    Dr. Louise Duarte

## 2017-09-05 NOTE — TELEPHONE ENCOUNTER
Contacted Siri Trent in endo and she states that VCE could be scheduled on 9/11 or 9/12. Contacted pt and she accepted appt for 9/11/17 with arrival time of 6:30am, electronic case request sent to endo.  I reviewed the prep instructions w/ her over the phone

## 2017-09-05 NOTE — TELEPHONE ENCOUNTER
Pt indicates she had both cln & egd last Tues and was advised by Dr. Ariel Tripathi to have another egd/procedure done. Pt was unsure if she had to call to request appt? Pt also wants you to know that she has a f/up appt on 9/7, should she keep that appt?  Pls call at:

## 2017-09-11 ENCOUNTER — SURGERY (OUTPATIENT)
Age: 72
End: 2017-09-11

## 2017-09-11 ENCOUNTER — HOSPITAL ENCOUNTER (OUTPATIENT)
Facility: HOSPITAL | Age: 72
Setting detail: HOSPITAL OUTPATIENT SURGERY
Discharge: HOME | End: 2017-09-11
Attending: INTERNAL MEDICINE | Admitting: INTERNAL MEDICINE
Payer: MEDICARE

## 2017-09-11 VITALS
BODY MASS INDEX: 28.22 KG/M2 | HEART RATE: 116 BPM | OXYGEN SATURATION: 95 % | DIASTOLIC BLOOD PRESSURE: 91 MMHG | SYSTOLIC BLOOD PRESSURE: 161 MMHG | HEIGHT: 59 IN | RESPIRATION RATE: 20 BRPM | WEIGHT: 140 LBS

## 2017-09-11 DIAGNOSIS — D64.9 ANEMIA, UNSPECIFIED TYPE: ICD-10-CM

## 2017-09-11 PROCEDURE — 0DJ07ZZ INSPECTION OF UPPER INTESTINAL TRACT, VIA NATURAL OR ARTIFICIAL OPENING: ICD-10-PCS | Performed by: INTERNAL MEDICINE

## 2017-09-12 ENCOUNTER — LAB ENCOUNTER (OUTPATIENT)
Dept: LAB | Age: 72
End: 2017-09-12
Attending: INTERNAL MEDICINE
Payer: MEDICARE

## 2017-09-12 ENCOUNTER — TELEPHONE (OUTPATIENT)
Dept: GASTROENTEROLOGY | Facility: CLINIC | Age: 72
End: 2017-09-12

## 2017-09-12 ENCOUNTER — OFFICE VISIT (OUTPATIENT)
Dept: INTERNAL MEDICINE CLINIC | Facility: CLINIC | Age: 72
End: 2017-09-12

## 2017-09-12 VITALS
WEIGHT: 140 LBS | HEART RATE: 80 BPM | TEMPERATURE: 98 F | DIASTOLIC BLOOD PRESSURE: 72 MMHG | SYSTOLIC BLOOD PRESSURE: 134 MMHG | BODY MASS INDEX: 28.22 KG/M2 | HEIGHT: 59 IN

## 2017-09-12 DIAGNOSIS — D64.9 MILD ANEMIA: ICD-10-CM

## 2017-09-12 DIAGNOSIS — D50.9 IRON DEFICIENCY ANEMIA, UNSPECIFIED IRON DEFICIENCY ANEMIA TYPE: Primary | ICD-10-CM

## 2017-09-12 DIAGNOSIS — E61.1 IRON DEFICIENCY: ICD-10-CM

## 2017-09-12 DIAGNOSIS — D64.9 MILD ANEMIA: Primary | ICD-10-CM

## 2017-09-12 DIAGNOSIS — E53.8 B12 DEFICIENCY: ICD-10-CM

## 2017-09-12 DIAGNOSIS — E11.9 DIABETES MELLITUS TYPE 2 WITHOUT RETINOPATHY (HCC): ICD-10-CM

## 2017-09-12 DIAGNOSIS — M06.9 RHEUMATOID ARTHRITIS INVOLVING MULTIPLE SITES, UNSPECIFIED RHEUMATOID FACTOR PRESENCE: ICD-10-CM

## 2017-09-12 DIAGNOSIS — Z51.81 THERAPEUTIC DRUG MONITORING: ICD-10-CM

## 2017-09-12 LAB
ALBUMIN SERPL BCP-MCNC: 3.3 G/DL (ref 3.5–4.8)
ALT SERPL-CCNC: 12 U/L (ref 14–54)
AST SERPL-CCNC: 22 U/L (ref 15–41)
BASOPHILS # BLD: 0.1 K/UL (ref 0–0.2)
BASOPHILS NFR BLD: 1 %
CREAT SERPL-MCNC: 1.23 MG/DL (ref 0.5–1.5)
CRP SERPL-MCNC: 2.9 MG/DL (ref 0–0.9)
EOSINOPHIL # BLD: 0.2 K/UL (ref 0–0.7)
EOSINOPHIL NFR BLD: 2 %
ERYTHROCYTE [DISTWIDTH] IN BLOOD BY AUTOMATED COUNT: 15.7 % (ref 11–15)
ERYTHROCYTE [SEDIMENTATION RATE] IN BLOOD: 70 MM/HR (ref 0–30)
HCT VFR BLD AUTO: 33.2 % (ref 35–48)
HGB BLD-MCNC: 10.8 G/DL (ref 12–16)
LYMPHOCYTES # BLD: 2.4 K/UL (ref 1–4)
LYMPHOCYTES NFR BLD: 25 %
MCH RBC QN AUTO: 26.2 PG (ref 27–32)
MCHC RBC AUTO-ENTMCNC: 32.4 G/DL (ref 32–37)
MCV RBC AUTO: 80.9 FL (ref 80–100)
MONOCYTES # BLD: 1.2 K/UL (ref 0–1)
MONOCYTES NFR BLD: 13 %
NEUTROPHILS # BLD AUTO: 5.7 K/UL (ref 1.8–7.7)
NEUTROPHILS NFR BLD: 59 %
PLATELET # BLD AUTO: 392 K/UL (ref 140–400)
PMV BLD AUTO: 8.4 FL (ref 7.4–10.3)
RBC # BLD AUTO: 4.11 M/UL (ref 3.7–5.4)
WBC # BLD AUTO: 9.6 K/UL (ref 4–11)

## 2017-09-12 PROCEDURE — 91110 GI TRC IMG INTRAL ESOPH-ILE: CPT | Performed by: INTERNAL MEDICINE

## 2017-09-12 PROCEDURE — 84460 ALANINE AMINO (ALT) (SGPT): CPT

## 2017-09-12 PROCEDURE — 85652 RBC SED RATE AUTOMATED: CPT

## 2017-09-12 PROCEDURE — G0463 HOSPITAL OUTPT CLINIC VISIT: HCPCS | Performed by: INTERNAL MEDICINE

## 2017-09-12 PROCEDURE — G0008 ADMIN INFLUENZA VIRUS VAC: HCPCS | Performed by: INTERNAL MEDICINE

## 2017-09-12 PROCEDURE — 36415 COLL VENOUS BLD VENIPUNCTURE: CPT

## 2017-09-12 PROCEDURE — 85025 COMPLETE CBC W/AUTO DIFF WBC: CPT

## 2017-09-12 PROCEDURE — 84450 TRANSFERASE (AST) (SGOT): CPT

## 2017-09-12 PROCEDURE — 86140 C-REACTIVE PROTEIN: CPT

## 2017-09-12 PROCEDURE — 82040 ASSAY OF SERUM ALBUMIN: CPT

## 2017-09-12 PROCEDURE — 82565 ASSAY OF CREATININE: CPT

## 2017-09-12 PROCEDURE — 99214 OFFICE O/P EST MOD 30 MIN: CPT | Performed by: INTERNAL MEDICINE

## 2017-09-12 PROCEDURE — 90653 IIV ADJUVANT VACCINE IM: CPT | Performed by: INTERNAL MEDICINE

## 2017-09-12 PROCEDURE — 96372 THER/PROPH/DIAG INJ SC/IM: CPT | Performed by: INTERNAL MEDICINE

## 2017-09-12 RX ORDER — CYANOCOBALAMIN 1000 UG/ML
1000 INJECTION INTRAMUSCULAR; SUBCUTANEOUS
Status: DISCONTINUED | OUTPATIENT
Start: 2017-09-12 | End: 2020-10-16

## 2017-09-12 RX ORDER — FERROUS SULFATE 325(65) MG
325 TABLET ORAL 2 TIMES DAILY
Refills: 0 | COMMUNITY
Start: 2017-09-12 | End: 2018-03-19

## 2017-09-12 RX ADMIN — CYANOCOBALAMIN 1000 MCG: 1000 INJECTION INTRAMUSCULAR; SUBCUTANEOUS at 15:11:00

## 2017-09-12 NOTE — OPERATIVE REPORT
VIDEO CAPSULE ENDOSCOPY REPORT    Patient: Verona Duque   : 1945    Procedure: small bowel capsule endoscopy    Pre-operative diagnosis: iron deficiency anemia    Post-operative diagnosis: normal small bowel capsule endoscopy    Medications given

## 2017-09-12 NOTE — H&P
History & Physical Examination    Patient Name: Sandie Oleary  MRN: [id number]  [facility]: [id number]  YOB: 1945    Diagnosis: iron deficiency anemia      No prescriptions prior to admission.     No current facility-administered medications for [facility] 16.00 Years     Types: Cigarettes    Quit date: 1/1/1975    Smokeless tobacco: Former User    Alcohol use No       SYSTEM Check if Physical Exam is Normal If not normal, please explain:   VANNESA Turk  [ Sil Kauffman [ Kartik Blackburn [ Sheng Auguste [ Regina Agarwal

## 2017-09-12 NOTE — TELEPHONE ENCOUNTER
Called patient to discuss results of video capsule endoscopy which was unremarkable. Discussed this with her and recommendation to start oral iron and referral to hematology for further evaluation, which she understands.     RN Staff: Please call patient

## 2017-09-12 NOTE — PROGRESS NOTES
Ellie Deleon is a 71year old female who presents for     1 month check    Anemia--  Had EGD and colonoscopy Dr Saloni Marin 8/29/17-was neg exc small non-bleeding int hemorrhoids. Had neg video SB capsule endoscopy 9/11/17.  He recom she go to hematologist. Claudeen Reiter, MD;  Location: [facility]   Family History   Problem Relation Age of Onset   • Heart Disease Father 67     CAD; Cause of death   • Cancer Maternal Aunt [de-identified]     pancreatic   • Macular degeneration Mother    •  age 80 old age Sharla Paiz Mother today. Had EGD and colonoscopy Dr Doris Olson 8/29/17-was neg exc small non-bleeding internal hemorrhoids. Had neg video SB capsule endoscopy 9/11/17. He recom she go to hematologist.   Refer to Dr. Randy Ayala.    check CBC.      Diabetes mellitus type 2 withou Rfl: 3   acyclovir 400 MG Oral Tab Take 1 tablet (400 mg total) by mouth 3 (three) times daily. As needed for cold sores Disp: 21 tablet Rfl: 3   Acetaminophen  MG Oral Tab CR Take 650 mg by mouth every 8 (eight) hours as needed.  Disp:  Rfl:    aspir

## 2017-09-12 NOTE — TELEPHONE ENCOUNTER
Referral entered for hematology. Pt contacted and provided her the number to call to schedule her appt at [phone], she verbalized understanding. No further questions at this time.

## 2017-09-13 ENCOUNTER — TELEPHONE (OUTPATIENT)
Dept: INTERNAL MEDICINE CLINIC | Facility: CLINIC | Age: 72
End: 2017-09-13

## 2017-09-13 NOTE — TELEPHONE ENCOUNTER
To nursing,   please tell patient the lab done on 9/12/17–CBC–results show her anemia is about the same as it was in July. Her hemoglobin is 10.8 on this lab compared to 10.9 on 7/28/17.     Go ahead and see the hematologist as we discussed at her visit

## 2017-09-18 ENCOUNTER — PATIENT MESSAGE (OUTPATIENT)
Dept: RHEUMATOLOGY | Facility: CLINIC | Age: 72
End: 2017-09-18

## 2017-09-18 RX ORDER — LEFLUNOMIDE 10 MG/1
TABLET ORAL
Qty: 135 TABLET | Refills: 0 | Status: SHIPPED | OUTPATIENT
Start: 2017-09-18 | End: 2017-12-04

## 2017-09-18 RX ORDER — LEFLUNOMIDE 10 MG/1
TABLET ORAL
Qty: 90 TABLET | Refills: 0 | Status: CANCELLED | OUTPATIENT
Start: 2017-09-18

## 2017-09-18 RX ORDER — LEFLUNOMIDE 10 MG/1
10 TABLET ORAL DAILY
Qty: 90 TABLET | Refills: 0 | Status: CANCELLED
Start: 2017-09-18

## 2017-09-18 NOTE — TELEPHONE ENCOUNTER
LOV: 5-25-17  Last Refilled:#90, 0rfs 6/28/17  Labs:AST 22 ALT 12  9/12/17  Future Appointments  Date Time Provider William Ursula   9/19/2017 10:00 AM Bharti Miguel MD [address] [place] [address] HEM ONC EMO   9/21/2017 9:30 AM Robert Hager PA-C [facility]GASTRO [facility] [place] [facility]   10/6/2017 11:00 AM Liv Gale MD [address] [place] [address]   12/18/2017 11:00 AM Erika Fabian MD [facility] EMA [place]   1/4/2018 1:45 PM Kimberley Macedo MD [address] [place] [address]       Please advise.

## 2017-09-18 NOTE — TELEPHONE ENCOUNTER
From: Eyl Thomas  Sent: 9/18/2017 1:54 PM CDT  Subject: Medication Renewal Request    Sid King.  Makenna would like a refill of the following medications:     leflunomide 10 MG Oral Tab Juan Hawkins MD]    Preferred pharmacy: [address] [place] [address] [phone], [phone]    [facility]

## 2017-09-18 NOTE — TELEPHONE ENCOUNTER
From: Mitzi Vicente  To: Huy Campos MD  Sent: 9/18/2017 2:00 PM CDT  Subject: Prescription Question    Hi, Doctor. I only have a two week supply for Leflunomide. I can't get in to see you until Oct. 6, but t I had my blood work done last week.  P

## 2017-09-19 ENCOUNTER — LAB ENCOUNTER (OUTPATIENT)
Dept: LAB | Facility: HOSPITAL | Age: 72
End: 2017-09-19
Attending: INTERNAL MEDICINE
Payer: MEDICARE

## 2017-09-19 ENCOUNTER — OFFICE VISIT (OUTPATIENT)
Dept: HEMATOLOGY/ONCOLOGY | Facility: HOSPITAL | Age: 72
End: 2017-09-19
Attending: INTERNAL MEDICINE
Payer: MEDICARE

## 2017-09-19 VITALS
HEIGHT: 59 IN | WEIGHT: 139 LBS | SYSTOLIC BLOOD PRESSURE: 149 MMHG | RESPIRATION RATE: 16 BRPM | HEART RATE: 77 BPM | DIASTOLIC BLOOD PRESSURE: 66 MMHG | TEMPERATURE: 98 F | BODY MASS INDEX: 28.02 KG/M2

## 2017-09-19 DIAGNOSIS — D64.9 NORMOCYTIC ANEMIA: Primary | ICD-10-CM

## 2017-09-19 DIAGNOSIS — D64.9 NORMOCYTIC ANEMIA: ICD-10-CM

## 2017-09-19 DIAGNOSIS — I25.810 CORONARY ARTERY DISEASE INVOLVING CORONARY BYPASS GRAFT OF NATIVE HEART WITHOUT ANGINA PECTORIS: ICD-10-CM

## 2017-09-19 LAB
ALBUMIN SERPL BCP-MCNC: 3.1 G/DL (ref 3.5–4.8)
ALBUMIN/GLOB SERPL: 0.7 {RATIO} (ref 1–2)
ALP SERPL-CCNC: 67 U/L (ref 32–100)
ALT SERPL-CCNC: 11 U/L (ref 14–54)
ANION GAP SERPL CALC-SCNC: 10 MMOL/L (ref 0–18)
AST SERPL-CCNC: 18 U/L (ref 15–41)
BASOPHILS # BLD: 0.1 K/UL (ref 0–0.2)
BASOPHILS NFR BLD: 1 %
BILIRUB SERPL-MCNC: 0.3 MG/DL (ref 0.3–1.2)
BUN SERPL-MCNC: 12 MG/DL (ref 8–20)
BUN/CREAT SERPL: 15.2 (ref 10–20)
CALCIUM SERPL-MCNC: 8.8 MG/DL (ref 8.5–10.5)
CHLORIDE SERPL-SCNC: 103 MMOL/L (ref 95–110)
CO2 SERPL-SCNC: 26 MMOL/L (ref 22–32)
CREAT SERPL-MCNC: 0.79 MG/DL (ref 0.5–1.5)
EOSINOPHIL # BLD: 0.2 K/UL (ref 0–0.7)
EOSINOPHIL NFR BLD: 2 %
ERYTHROCYTE [DISTWIDTH] IN BLOOD BY AUTOMATED COUNT: 16.4 % (ref 11–15)
FERRITIN SERPL IA-MCNC: 11 NG/ML (ref 11–307)
FOLATE SERPL-MCNC: >23.3 NG/ML
GLOBULIN PLAS-MCNC: 4.3 G/DL (ref 2.5–3.7)
GLUCOSE SERPL-MCNC: 107 MG/DL (ref 70–99)
HAPTOGLOB SERPL-MCNC: 352 MG/DL (ref 16–200)
HCT VFR BLD AUTO: 33.3 % (ref 35–48)
HGB BLD-MCNC: 10.8 G/DL (ref 12–16)
IRON SATN MFR SERPL: 13 % (ref 15–50)
IRON SERPL-MCNC: 48 MCG/DL (ref 28–170)
LDH SERPL L TO P-CCNC: 119 U/L (ref 98–192)
LYMPHOCYTES # BLD: 2.1 K/UL (ref 1–4)
LYMPHOCYTES NFR BLD: 25 %
MCH RBC QN AUTO: 26.3 PG (ref 27–32)
MCHC RBC AUTO-ENTMCNC: 32.4 G/DL (ref 32–37)
MCV RBC AUTO: 81.2 FL (ref 80–100)
MONOCYTES # BLD: 0.9 K/UL (ref 0–1)
MONOCYTES NFR BLD: 11 %
NEUTROPHILS # BLD AUTO: 5.1 K/UL (ref 1.8–7.7)
NEUTROPHILS NFR BLD: 62 %
OSMOLALITY UR CALC.SUM OF ELEC: 288 MOSM/KG (ref 275–295)
PLATELET # BLD AUTO: 372 K/UL (ref 140–400)
PMV BLD AUTO: 8 FL (ref 7.4–10.3)
POTASSIUM SERPL-SCNC: 4.1 MMOL/L (ref 3.3–5.1)
PROT SERPL-MCNC: 7.4 G/DL (ref 5.9–8.4)
RBC # BLD AUTO: 4.1 M/UL (ref 3.7–5.4)
RETICS/RBC NFR AUTO: 1.5 % (ref 0.5–1.5)
SODIUM SERPL-SCNC: 139 MMOL/L (ref 136–144)
TIBC SERPL-MCNC: 383 MCG/DL (ref 228–428)
TRANSFERRIN SERPL-MCNC: 290 MG/DL (ref 192–382)
VIT B12 SERPL-MCNC: 484 PG/ML (ref 181–914)
WBC # BLD AUTO: 8.3 K/UL (ref 4–11)

## 2017-09-19 PROCEDURE — G0463 HOSPITAL OUTPT CLINIC VISIT: HCPCS | Performed by: INTERNAL MEDICINE

## 2017-09-19 PROCEDURE — 82607 VITAMIN B-12: CPT

## 2017-09-19 PROCEDURE — 83010 ASSAY OF HAPTOGLOBIN QUANT: CPT

## 2017-09-19 PROCEDURE — 36415 COLL VENOUS BLD VENIPUNCTURE: CPT

## 2017-09-19 PROCEDURE — 80053 COMPREHEN METABOLIC PANEL: CPT

## 2017-09-19 PROCEDURE — 85025 COMPLETE CBC W/AUTO DIFF WBC: CPT

## 2017-09-19 PROCEDURE — 83540 ASSAY OF IRON: CPT

## 2017-09-19 PROCEDURE — 85045 AUTOMATED RETICULOCYTE COUNT: CPT

## 2017-09-19 PROCEDURE — 83615 LACTATE (LD) (LDH) ENZYME: CPT

## 2017-09-19 PROCEDURE — 82746 ASSAY OF FOLIC ACID SERUM: CPT

## 2017-09-19 PROCEDURE — 99204 OFFICE O/P NEW MOD 45 MIN: CPT | Performed by: INTERNAL MEDICINE

## 2017-09-19 PROCEDURE — 82728 ASSAY OF FERRITIN: CPT

## 2017-09-19 PROCEDURE — 84466 ASSAY OF TRANSFERRIN: CPT

## 2017-09-19 NOTE — PROGRESS NOTES
Hematology Consultation Note    Patient Name: Chidi Neff   YOB: 1945   Medical Record Number: [id number]   CSN: [id number]   Consulting Physician: Belinda Dale MD  Referring Physician(s): Elizabeth Quarles  Date of Consultation: 9/19/20 about 2014)   • Rheumatoid arthritis [facility]) 2010    sees Dr Garcia Nash   • Type 2 diabetes [facility] 2005    oral medication. • Vitamin B12 deficiency 07/2017    Vitamin B12 level low at 126 on 7/28/17.        Past Surgical History:  Past Surgical Hi other day by mouth.  Disp: 135 tablet Rfl: 0   Ferrous Sulfate 325 (65 Fe) MG Oral Tab Take 1 tablet (325 mg total) by mouth daily with breakfast. Disp:  Rfl: 0   glimepiride 2 MG Oral Tab Take 0.5 tablets (1 mg total) by mouth daily with breakfast. Disp: 9 irritation and redness. Respiratory: Negative for cough, hemoptysis, chest pain, or dyspnea on exertion.   Gastrointestinal: Negative for dysphagia, odynophagia, reflux symptoms, nausea, vomiting, change in bowel habits, diarrhea, constipation and abdomina Lab Results  Component Value Date/Time    (H) 09/19/2017 11:10 AM   BUN 12 09/19/2017 11:10 AM   CREATSERUM 0.79 09/19/2017 11:10 AM   GFRNAA >60 09/19/2017 11:10 AM   CA 8.8 09/19/2017 11:10 AM   ALB 3.1 (L) 09/19/2017 11:10 AM    09/ iron 65 mg twice daily is recommended (325 mg ferrous sulfate)  --Warned patient of side effects from iron therapy such as dark stools, nausea, and constipation  --Evaluating for other causes of anemia such as hemolysis  --We have repeated iron studies, B1

## 2017-09-20 ENCOUNTER — TELEPHONE (OUTPATIENT)
Dept: HEMATOLOGY/ONCOLOGY | Facility: HOSPITAL | Age: 72
End: 2017-09-20

## 2017-09-20 DIAGNOSIS — D64.9 ANEMIA: Primary | ICD-10-CM

## 2017-09-21 ENCOUNTER — OFFICE VISIT (OUTPATIENT)
Dept: GASTROENTEROLOGY | Facility: CLINIC | Age: 72
End: 2017-09-21

## 2017-09-21 ENCOUNTER — TELEPHONE (OUTPATIENT)
Dept: RHEUMATOLOGY | Facility: CLINIC | Age: 72
End: 2017-09-21

## 2017-09-21 VITALS
SYSTOLIC BLOOD PRESSURE: 136 MMHG | HEIGHT: 59 IN | HEART RATE: 78 BPM | BODY MASS INDEX: 28.22 KG/M2 | WEIGHT: 140 LBS | DIASTOLIC BLOOD PRESSURE: 81 MMHG

## 2017-09-21 DIAGNOSIS — Z09 FOLLOW UP: ICD-10-CM

## 2017-09-21 DIAGNOSIS — D64.9 ANEMIA, UNSPECIFIED TYPE: Primary | ICD-10-CM

## 2017-09-21 PROCEDURE — G0463 HOSPITAL OUTPT CLINIC VISIT: HCPCS | Performed by: PHYSICIAN ASSISTANT

## 2017-09-21 PROCEDURE — 99213 OFFICE O/P EST LOW 20 MIN: CPT | Performed by: PHYSICIAN ASSISTANT

## 2017-09-21 NOTE — TELEPHONE ENCOUNTER
Patient contacted and advised per Dr Olga Lidia Edmonds that the results of her lab testing indicates her anemia is related to low B12 and iron and she should continue with her B12 and iron supplements.  Patient instructed to take iron tablets twice daily with food to

## 2017-09-21 NOTE — PROGRESS NOTES
[address] [facility] Follow-up Visit    Blanche reported history of chest pain (previous MI) or shortness of breath.     Personal Hx Cancer(s):  - None     Surgical Hx:  - CABG in 2007  -  x 3  - Appendectomy   - No known issues with sedation.  - Unknown if history of sleep apnea.     Pertinent pancreatic   • Macular degeneration Mother    •  age 80 old age Tigist Gen Mother    • Stroke Sister 46     CVA   • 1 brother, 2 sisters.  [OTHER] Other    • sciatica [OTHER] Brother    • 2 sons, 1 daughter Tigist Blevins Other    • Asthma Son    • Bleeding D Allergies:  No Known Allergies    ROS:   CONSTITUTIONAL:  negative for fevers, chills  EYES:  negative for change in vision+glasses  RESPIRATORY:  negative for  shortness of breath  CARDIOVASCULAR:  negative for  chest pain  GASTROINTESTINAL:  see [facility] last 2 years and often turned away due to low hemoglobin. Capsule endoscopy with Dr. Sharyle Flatness on 9/11 was unremarkable. Additionally, bi-directional endoscopic evaluations end of August with Dr. Sharyle Flatness revealed normal examinations.  Patient was advised

## 2017-09-21 NOTE — PATIENT INSTRUCTIONS
Continue to follow with Dr. Rayne Medina, if anything changes in your GI health, please call me and I will be more than happy to take care of you.

## 2017-09-21 NOTE — TELEPHONE ENCOUNTER
Started on [email or web address]    Please note any additional information provided by [facility] at the bottom of your screen. You will receive a final determination electronically in [employer] and via email and fax within 24 to 72 hours.

## 2017-09-22 ENCOUNTER — PATIENT MESSAGE (OUTPATIENT)
Dept: RHEUMATOLOGY | Facility: CLINIC | Age: 72
End: 2017-09-22

## 2017-09-25 NOTE — TELEPHONE ENCOUNTER
Approvedon September 23   PA Case: [id number], Status: Approved, Coverage Starts on: 9/23/2017 12:00:00 AM, Coverage Ends on: 9/23/2018 12:00:00 AM. Questions? Contact [phone].

## 2017-09-26 NOTE — TELEPHONE ENCOUNTER
From: Verona Duque  To: Abimael Albarado MD  Sent: 9/22/2017 5:37 PM CDT  Subject: Referral Request    I talked to [facility] today. I was told they sent you a pre-authorization for the Leflunomide and they haven't had a response.  Please send au

## 2017-10-06 ENCOUNTER — OFFICE VISIT (OUTPATIENT)
Dept: RHEUMATOLOGY | Facility: CLINIC | Age: 72
End: 2017-10-06

## 2017-10-06 VITALS
TEMPERATURE: 98 F | DIASTOLIC BLOOD PRESSURE: 79 MMHG | WEIGHT: 138 LBS | SYSTOLIC BLOOD PRESSURE: 138 MMHG | HEIGHT: 59 IN | HEART RATE: 79 BPM | BODY MASS INDEX: 27.82 KG/M2

## 2017-10-06 DIAGNOSIS — Z51.81 THERAPEUTIC DRUG MONITORING: ICD-10-CM

## 2017-10-06 DIAGNOSIS — M06.9 RHEUMATOID ARTHRITIS INVOLVING MULTIPLE SITES, UNSPECIFIED RHEUMATOID FACTOR PRESENCE: Primary | ICD-10-CM

## 2017-10-06 PROCEDURE — G0463 HOSPITAL OUTPT CLINIC VISIT: HCPCS | Performed by: INTERNAL MEDICINE

## 2017-10-06 PROCEDURE — 99214 OFFICE O/P EST MOD 30 MIN: CPT | Performed by: INTERNAL MEDICINE

## 2017-10-06 RX ORDER — PERPHENAZINE/AMITRIPTYLINE HCL 2 MG-25 MG
TABLET ORAL DAILY
COMMUNITY
End: 2017-10-12

## 2017-10-06 NOTE — PROGRESS NOTES
Yoly He is a 71year old female. HPI:   Patient presents with:  Rheumatoid Arthritis: wrist,hand and arm pain      I had the pleasure of seeing Austin Espinosa on 10/6/2017. I had last seen her on 5/25/2017. I had last seen her on 12/12/2016.  I had jazzy tomas she gets numb and stiffness. It takes a whiel somtesim. It's not severe. No flares ups. She's been on leflunomide 10mg a day. She may need to switch b/c it will be more expensive. She's doing well.      5/25/2017  She tried ssz - but it was atherosclerosis of autologous vein bypass graft    • Elevated serum globulin level 07/2017    HECTOR-7/28/17-no monoclonal proteins. • Hyperlipidemia, mixed    • Hypertension, essential    • Osteopenia     dexa 2008-osteopenia.     • Preretinal hemorrhage o Rfl:    Calcium Carb-Cholecalciferol (CALCIUM 500 +D OR) Take  by mouth. Take 1 tab. Every day Disp:  Rfl:        Allergies:  No Known Allergies      ROS:   All other ROS are negative.      PHYSICAL EXAM:   HEENT: Clear oropharynx, no oral ulcers, EOM intac 100.0 fL 81.2   MCH      27.0 - 32.0 pg 26.3 (L)   MCHC      32.0 - 37.0 g/dl 32.4   RDW      11.0 - 15.0 % 16.4 (H)   Platelet Count      714 - 400 K/   MEAN PLATELET VOLUME      7.4 - 10.3 fL 8.0   Neutrophils %      % 62   Lymphocytes %      % 25 calcium and vitamin D  4. CAD status post CABG a left leg venous grafting-stable   5. Diabetes type 2-controlled, continue meds  6. Anemia - EDWIN - egd/cscope ok - f/u hematology - but could also be from chronic disease -       Summary:  1.  Cont.  sulfasa

## 2017-10-06 NOTE — PATIENT INSTRUCTIONS
1. Cont.  sulfasalazine 500mg twice a day    2. Increase lelfunomide to 20mg a day - please let me know if able to tolerate and when your done I can send in the script again -   3.  If feeling still some pain in 3-4 weeks, then  can increase sulfasalazine t

## 2017-10-17 ENCOUNTER — NURSE ONLY (OUTPATIENT)
Dept: INTERNAL MEDICINE CLINIC | Facility: CLINIC | Age: 72
End: 2017-10-17

## 2017-10-17 DIAGNOSIS — E53.8 VITAMIN B12 DEFICIENCY: Primary | ICD-10-CM

## 2017-10-17 PROCEDURE — 96372 THER/PROPH/DIAG INJ SC/IM: CPT | Performed by: INTERNAL MEDICINE

## 2017-10-17 RX ADMIN — CYANOCOBALAMIN 1000 MCG: 1000 INJECTION INTRAMUSCULAR; SUBCUTANEOUS at 11:48:00

## 2017-10-17 NOTE — PROGRESS NOTES
Patient presents for monthly Vitamin B12 injection. Name and  verified. Injection administered into patients left deltoid-tolerated injection well.

## 2017-11-20 ENCOUNTER — NURSE ONLY (OUTPATIENT)
Dept: INTERNAL MEDICINE CLINIC | Facility: CLINIC | Age: 72
End: 2017-11-20

## 2017-11-20 DIAGNOSIS — D50.9 IRON DEFICIENCY ANEMIA, UNSPECIFIED IRON DEFICIENCY ANEMIA TYPE: ICD-10-CM

## 2017-11-20 PROCEDURE — 96372 THER/PROPH/DIAG INJ SC/IM: CPT | Performed by: INTERNAL MEDICINE

## 2017-11-20 RX ADMIN — CYANOCOBALAMIN 1000 MCG: 1000 INJECTION INTRAMUSCULAR; SUBCUTANEOUS at 11:12:00

## 2017-12-05 NOTE — TELEPHONE ENCOUNTER
LOV: 10-6-17  Last Refilled:#135, 0rfs 9/18/17  Labs:AST 18  ALT  11  9/19/17  Future Appointments  Date Time Provider William Vergara   12/18/2017 11:00 AM Ana María Monge MD [facility] [place]   12/19/2017 11:00 AM Elvis Ervin MD [address] [place] [address] HEM ONC E

## 2017-12-06 RX ORDER — LEFLUNOMIDE 10 MG/1
TABLET ORAL
Qty: 135 TABLET | Refills: 0 | Status: SHIPPED | OUTPATIENT
Start: 2017-12-06 | End: 2017-12-07

## 2017-12-07 ENCOUNTER — TELEPHONE (OUTPATIENT)
Dept: RHEUMATOLOGY | Facility: CLINIC | Age: 72
End: 2017-12-07

## 2017-12-07 RX ORDER — LEFLUNOMIDE 10 MG/1
TABLET ORAL
Qty: 135 TABLET | Refills: 1 | Status: SHIPPED
Start: 2017-12-07 | End: 2018-02-07 | Stop reason: DRUGHIGH

## 2017-12-07 RX ORDER — LEFLUNOMIDE 10 MG/1
TABLET ORAL
Qty: 135 TABLET | Refills: 1 | Status: SHIPPED
Start: 2017-12-07 | End: 2017-12-07

## 2017-12-18 ENCOUNTER — LAB ENCOUNTER (OUTPATIENT)
Dept: LAB | Age: 72
End: 2017-12-18
Attending: INTERNAL MEDICINE
Payer: MEDICARE

## 2017-12-18 ENCOUNTER — OFFICE VISIT (OUTPATIENT)
Dept: INTERNAL MEDICINE CLINIC | Facility: CLINIC | Age: 72
End: 2017-12-18

## 2017-12-18 VITALS
OXYGEN SATURATION: 96 % | HEIGHT: 59 IN | DIASTOLIC BLOOD PRESSURE: 78 MMHG | WEIGHT: 136 LBS | BODY MASS INDEX: 27.42 KG/M2 | SYSTOLIC BLOOD PRESSURE: 110 MMHG | TEMPERATURE: 98 F | HEART RATE: 88 BPM

## 2017-12-18 DIAGNOSIS — E11.9 DIABETES MELLITUS TYPE 2 WITHOUT RETINOPATHY (HCC): ICD-10-CM

## 2017-12-18 DIAGNOSIS — D50.9 IRON DEFICIENCY ANEMIA, UNSPECIFIED IRON DEFICIENCY ANEMIA TYPE: Primary | ICD-10-CM

## 2017-12-18 DIAGNOSIS — E53.8 VITAMIN B12 DEFICIENCY: ICD-10-CM

## 2017-12-18 DIAGNOSIS — Z12.31 VISIT FOR SCREENING MAMMOGRAM: ICD-10-CM

## 2017-12-18 DIAGNOSIS — D64.9 ANEMIA: ICD-10-CM

## 2017-12-18 PROCEDURE — G0463 HOSPITAL OUTPT CLINIC VISIT: HCPCS | Performed by: INTERNAL MEDICINE

## 2017-12-18 PROCEDURE — 85025 COMPLETE CBC W/AUTO DIFF WBC: CPT

## 2017-12-18 PROCEDURE — 96372 THER/PROPH/DIAG INJ SC/IM: CPT | Performed by: INTERNAL MEDICINE

## 2017-12-18 PROCEDURE — 82728 ASSAY OF FERRITIN: CPT

## 2017-12-18 PROCEDURE — 80053 COMPREHEN METABOLIC PANEL: CPT

## 2017-12-18 PROCEDURE — 36415 COLL VENOUS BLD VENIPUNCTURE: CPT

## 2017-12-18 PROCEDURE — 83540 ASSAY OF IRON: CPT

## 2017-12-18 PROCEDURE — 84466 ASSAY OF TRANSFERRIN: CPT

## 2017-12-18 PROCEDURE — 99214 OFFICE O/P EST MOD 30 MIN: CPT | Performed by: INTERNAL MEDICINE

## 2017-12-18 PROCEDURE — 82607 VITAMIN B-12: CPT

## 2017-12-18 RX ADMIN — CYANOCOBALAMIN 1000 MCG: 1000 INJECTION INTRAMUSCULAR; SUBCUTANEOUS at 11:46:00

## 2017-12-18 NOTE — PROGRESS NOTES
Vj Moreland is a 71year old female who presents for     3 month check     Anemia--  saw hematologist Dr. Harriet Gentile 9/19/17-and has f/u appt tomorrow. Normocytic anemia -B12 defic and iron defic, may be chronic disease.    Getting vit B12 shots monthly here Problem Relation Age of Onset   • Heart Disease Father 67     CAD; Cause of death   • Cancer Maternal Aunt [de-identified]     pancreatic   • Macular degeneration Mother    •  age 80 old age [employer] Mother    • Stroke Sister 46     CVA   • 1 brother, 2 sisters.  [ low nl ferritin of 15. Vit B12 level 126. Last Hgb 10.8 on 9/19/17 per Dr. Marce Kelley. Vit B12 1000 mcg IM since 8/8/17. Give shot today. Also FeSo4 65 mg daily. EGD , colonoscopy Dr Griselda So 8/29/17-neg exc small non-bleeding internal hemorrhoids.  Video TABLET (10MG) ONE DAY WITH 2 TABLETS (20MG) THE NEXT DAY AS DIRECTED Disp: 135 tablet Rfl: 1   cyanocobalamin 1000 MCG/ML Injection Solution Inject 1 mL (1,000 mcg total) into the muscle every 30 (thirty) days.  Disp: 1 vial Rfl: 0   Ferrous Sulfate 325 (65

## 2017-12-18 NOTE — PROGRESS NOTES
Name and  verified. B12 administered right deltoid. Patient tolerated w/o complaint. NO adverse reaction noted.

## 2017-12-19 ENCOUNTER — OFFICE VISIT (OUTPATIENT)
Dept: HEMATOLOGY/ONCOLOGY | Facility: HOSPITAL | Age: 72
End: 2017-12-19
Attending: INTERNAL MEDICINE
Payer: MEDICARE

## 2017-12-19 VITALS
TEMPERATURE: 97 F | SYSTOLIC BLOOD PRESSURE: 157 MMHG | DIASTOLIC BLOOD PRESSURE: 83 MMHG | HEART RATE: 88 BPM | WEIGHT: 135 LBS | HEIGHT: 59 IN | BODY MASS INDEX: 27.21 KG/M2 | RESPIRATION RATE: 16 BRPM

## 2017-12-19 DIAGNOSIS — D64.9 NORMOCYTIC ANEMIA: Primary | ICD-10-CM

## 2017-12-19 DIAGNOSIS — D50.8 OTHER IRON DEFICIENCY ANEMIA: ICD-10-CM

## 2017-12-19 PROCEDURE — 99214 OFFICE O/P EST MOD 30 MIN: CPT | Performed by: INTERNAL MEDICINE

## 2017-12-19 PROCEDURE — G0463 HOSPITAL OUTPT CLINIC VISIT: HCPCS | Performed by: INTERNAL MEDICINE

## 2017-12-19 NOTE — PROGRESS NOTES
Hematology Progress Note    Patient Name: Chidi Neff   YOB: 1945   Medical Record Number: [id number]   CSN: [id number]   Consulting Physician: Belinda Dale MD  Referring Physician(s): Elizabeth Quarles  Date of Visit: 12/19/2017     Ra acute MI and had CABG in 3/2007    • Cataracts, bilateral 2004    OU; sees Dr. 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     • Hyperlipidemia, mixe Yes  0.0 oz/week     Comment: moderate use    Drug use: No    Sexual activity: Not on file     Other Topics Concern    Caffeine Concern No     Social History Narrative    [place] is  x 40 yrs; mother of 3 adult children.  She is no longer worker; former fatigue, fevers, night sweats and weight loss. Eyes: Negative for visual disturbance, irritation and redness. Respiratory: Negative for cough, hemoptysis, chest pain, or dyspnea on exertion.   Gastrointestinal: Negative for dysphagia, odynophagia, reflux RDW 16.2 (H) 12/18/2017 11:59 AM    12/18/2017 11:59 AM         Lab Results  Component Value Date/Time    (H) 12/18/2017 11:59 AM   BUN 14 12/18/2017 11:59 AM   CREATSERUM 0.89 12/18/2017 11:59 AM   GFRNAA >60 12/18/2017 11:59 AM   CA 9.4 1 vitamin B12 supplements in addition to injections; advised she does not require oral therapy as well    --Patient lower ferritin level with slightly decreased iron saturation concerning for iron deficiency anemia.   Again this appears to likely be dietary i

## 2018-01-19 ENCOUNTER — NURSE ONLY (OUTPATIENT)
Dept: INTERNAL MEDICINE CLINIC | Facility: CLINIC | Age: 73
End: 2018-01-19

## 2018-01-19 DIAGNOSIS — E53.8 B12 DEFICIENCY: Primary | ICD-10-CM

## 2018-01-19 PROCEDURE — 96372 THER/PROPH/DIAG INJ SC/IM: CPT | Performed by: INTERNAL MEDICINE

## 2018-01-19 RX ADMIN — CYANOCOBALAMIN 1000 MCG: 1000 INJECTION INTRAMUSCULAR; SUBCUTANEOUS at 11:09:00

## 2018-01-26 RX ORDER — SIMVASTATIN 40 MG
TABLET ORAL
Qty: 90 TABLET | Refills: 3 | Status: SHIPPED | OUTPATIENT
Start: 2018-01-26 | End: 2018-12-29

## 2018-01-26 NOTE — TELEPHONE ENCOUNTER
Pt now see Dr. Kenan Davis.    Cholesterol Medications  Protocol Criteria:  · Appointment scheduled in the past 12 months or in the next 3 months  · ALT & LDL on file in the past 12 months  · ALT result < 80  · LDL result <130   Recent Outpatient Visits

## 2018-01-26 NOTE — TELEPHONE ENCOUNTER
Refill sent for simvatatin 40 mg daily #90 w 3 refills to [facility].

## 2018-02-07 ENCOUNTER — OFFICE VISIT (OUTPATIENT)
Dept: RHEUMATOLOGY | Facility: CLINIC | Age: 73
End: 2018-02-07

## 2018-02-07 VITALS
SYSTOLIC BLOOD PRESSURE: 130 MMHG | HEART RATE: 80 BPM | DIASTOLIC BLOOD PRESSURE: 79 MMHG | HEIGHT: 59 IN | WEIGHT: 130 LBS | TEMPERATURE: 97 F | BODY MASS INDEX: 26.21 KG/M2

## 2018-02-07 DIAGNOSIS — M06.9 RHEUMATOID ARTHRITIS INVOLVING MULTIPLE SITES, UNSPECIFIED RHEUMATOID FACTOR PRESENCE: Primary | ICD-10-CM

## 2018-02-07 DIAGNOSIS — Z51.81 THERAPEUTIC DRUG MONITORING: ICD-10-CM

## 2018-02-07 PROCEDURE — G0463 HOSPITAL OUTPT CLINIC VISIT: HCPCS | Performed by: INTERNAL MEDICINE

## 2018-02-07 PROCEDURE — 99214 OFFICE O/P EST MOD 30 MIN: CPT | Performed by: INTERNAL MEDICINE

## 2018-02-07 RX ORDER — LEFLUNOMIDE 20 MG/1
20 TABLET ORAL DAILY
Qty: 90 TABLET | Refills: 1 | Status: SHIPPED | OUTPATIENT
Start: 2018-02-07 | End: 2018-07-23

## 2018-02-07 RX ORDER — SULFASALAZINE 500 MG/1
1000 TABLET ORAL 3 TIMES DAILY
Qty: 540 TABLET | Refills: 1 | Status: SHIPPED | OUTPATIENT
Start: 2018-02-07 | End: 2018-03-19

## 2018-02-07 RX ORDER — SULFASALAZINE 500 MG/1
1000 TABLET ORAL 2 TIMES DAILY
Qty: 360 TABLET | Refills: 0 | COMMUNITY
Start: 2018-02-07 | End: 2018-02-07

## 2018-02-07 NOTE — PROGRESS NOTES
Yarely Andrea is a 71year old female. HPI:   Patient presents with:  Rheumatoid Arthritis      I had the pleasure of seeing Otelia Albany on 2/7/2018. I had last seen her on 10/6/2017. I had last seen her on 5/25/2017.  I had last seen her on 12/12/2016 occl her righ thand she gets numb and stiffness. It takes a whiel somtesim. It's not severe. No flares ups. She's been on leflunomide 10mg a day. She may need to switch b/c it will be more expensive. She's doing well.      5/25/2017  She tried ssz - feeling like she did better on methotrexate - it wasn't so bad. The leflunomide is just as expensive as methotrexate.              HISTORY:  Past Medical History:   Diagnosis Date   • Amblyopia 1951    OD   • Anemia 07/2017    mixed iron and B12 deficiency Oral Tab Take 1 tablet (400 mg total) by mouth 3 (three) times daily. As needed for cold sores Disp: 21 tablet Rfl: 3   Acetaminophen  MG Oral Tab CR Take 650 mg by mouth every 8 (eight) hours as needed.  Disp:  Rfl:    aspirin 325 MG Oral Tab Take 32 GAP      0 - 18 mmol/L 10   BUN/CREA RATIO      10.0 - 20.0 15.2   CALCULATED OSMOLALITY      275 - 295 mOsm/kg 288   GFR, Non-      >=60 >60   GFR, -American      >=60 >60   WBC      4.0 - 11.0 K/UL 8.3   RBC      3.70 - 5.40 M/UL 4 0. 3   TOTAL PROTEIN      5.9 - 8.4 g/dL 7.2   Albumin      3.5 - 4.8 g/dL 3.3 (L)   GLOBULIN, TOTAL      2.5 - 3.7 g/dL 3.9 (H)   A/G RATIO      1.0 - 2.0 0.8 (L)   ANION GAP      0 - 18 mmol/L 12   BUN/CREA RATIO      10.0 - 20.0 15.7   CALCULATED OSMOLAL to switch back to methotrexate   - or consider cimzia -   Discussed with patient risks and benefits of the medications, including TB risk, malignancy risk and infection risk.    - check hep b and c studies and quantiferon gold on next iist.   - return to cl

## 2018-02-07 NOTE — PATIENT INSTRUCTIONS
1. Cont. Sulfasalazine - try to increase to 1000mg three tiems day  500mg twice a day    2. Increase lelfunomide to 20mg a day - do this first and then try to increase sulfasalzine. 3. Check labs in march   4. Return to clinic in 3 months .    5. Conside · unusual bleeding or bruising  · unusually weak or tired  Side effects that usually do not require medical attention (report to your doctor or health care professional if they continue or are bothersome):  · irritation at site where injected  What may int Visit your doctor or health care professional for regular checks on your progress. Tell your doctor or healthcare professional if your symptoms do not start to get better or if they get worse.  Your condition will be monitored carefully while you are receiv

## 2018-02-20 ENCOUNTER — NURSE ONLY (OUTPATIENT)
Dept: INTERNAL MEDICINE CLINIC | Facility: CLINIC | Age: 73
End: 2018-02-20

## 2018-02-20 DIAGNOSIS — E53.8 B12 DEFICIENCY: Primary | ICD-10-CM

## 2018-02-20 PROCEDURE — 96372 THER/PROPH/DIAG INJ SC/IM: CPT | Performed by: INTERNAL MEDICINE

## 2018-02-20 RX ADMIN — CYANOCOBALAMIN 1000 MCG: 1000 INJECTION INTRAMUSCULAR; SUBCUTANEOUS at 11:09:00

## 2018-02-20 NOTE — PROGRESS NOTES
Name and  verified. B12 administered left deltoid. Patient tolerated w/o complaint. No adverse reactions noted.

## 2018-03-07 ENCOUNTER — PATIENT MESSAGE (OUTPATIENT)
Dept: INTERNAL MEDICINE CLINIC | Facility: CLINIC | Age: 73
End: 2018-03-07

## 2018-03-07 ENCOUNTER — PATIENT MESSAGE (OUTPATIENT)
Dept: RHEUMATOLOGY | Facility: CLINIC | Age: 73
End: 2018-03-07

## 2018-03-07 NOTE — TELEPHONE ENCOUNTER
From: Clint Das  To: Sharon Banda MD  Sent: 3/7/2018 8:18 AM CST  Subject: Other    I experience a lot of nausea most days after taking my meds. I think it's the sulfasalazine.  Yesterday after throwing up at [facility], a friend told me about [facility]

## 2018-03-08 ENCOUNTER — LAB ENCOUNTER (OUTPATIENT)
Dept: LAB | Age: 73
End: 2018-03-08
Attending: INTERNAL MEDICINE
Payer: MEDICARE

## 2018-03-08 DIAGNOSIS — M06.9 RHEUMATOID ARTHRITIS INVOLVING MULTIPLE SITES, UNSPECIFIED RHEUMATOID FACTOR PRESENCE: ICD-10-CM

## 2018-03-08 DIAGNOSIS — D50.8 OTHER IRON DEFICIENCY ANEMIA: ICD-10-CM

## 2018-03-08 DIAGNOSIS — Z51.81 THERAPEUTIC DRUG MONITORING: ICD-10-CM

## 2018-03-08 DIAGNOSIS — D64.9 NORMOCYTIC ANEMIA: ICD-10-CM

## 2018-03-08 LAB
ALBUMIN SERPL BCP-MCNC: 3.5 G/DL (ref 3.5–4.8)
ALBUMIN/GLOB SERPL: 1 {RATIO} (ref 1–2)
ALP SERPL-CCNC: 49 U/L (ref 32–100)
ALT SERPL-CCNC: 11 U/L (ref 14–54)
ANION GAP SERPL CALC-SCNC: 11 MMOL/L (ref 0–18)
AST SERPL-CCNC: 16 U/L (ref 15–41)
BASOPHILS # BLD: 0.1 K/UL (ref 0–0.2)
BASOPHILS NFR BLD: 1 %
BILIRUB SERPL-MCNC: 0.5 MG/DL (ref 0.3–1.2)
BUN SERPL-MCNC: 12 MG/DL (ref 8–20)
BUN/CREAT SERPL: 14.5 (ref 10–20)
CALCIUM SERPL-MCNC: 8.9 MG/DL (ref 8.5–10.5)
CHLORIDE SERPL-SCNC: 101 MMOL/L (ref 95–110)
CO2 SERPL-SCNC: 29 MMOL/L (ref 22–32)
CREAT SERPL-MCNC: 0.83 MG/DL (ref 0.5–1.5)
CRP SERPL-MCNC: 1.1 MG/DL (ref 0–0.9)
EOSINOPHIL # BLD: 0.1 K/UL (ref 0–0.7)
EOSINOPHIL NFR BLD: 1 %
ERYTHROCYTE [DISTWIDTH] IN BLOOD BY AUTOMATED COUNT: 14.6 % (ref 11–15)
ERYTHROCYTE [SEDIMENTATION RATE] IN BLOOD: 47 MM/HR (ref 0–30)
FERRITIN SERPL IA-MCNC: 62 NG/ML (ref 11–307)
GLOBULIN PLAS-MCNC: 3.6 G/DL (ref 2.5–3.7)
GLUCOSE SERPL-MCNC: 147 MG/DL (ref 70–99)
HBV CORE AB SERPL QL IA: NONREACTIVE
HBV SURFACE AG SERPL QL IA: NONREACTIVE
HCT VFR BLD AUTO: 36.2 % (ref 35–48)
HCV AB SERPL QL IA: NONREACTIVE
HGB BLD-MCNC: 11.9 G/DL (ref 12–16)
IRON SATN MFR SERPL: 21 % (ref 15–50)
IRON SERPL-MCNC: 62 MCG/DL (ref 28–170)
LYMPHOCYTES # BLD: 1.7 K/UL (ref 1–4)
LYMPHOCYTES NFR BLD: 22 %
MCH RBC QN AUTO: 29.7 PG (ref 27–32)
MCHC RBC AUTO-ENTMCNC: 32.8 G/DL (ref 32–37)
MCV RBC AUTO: 90.5 FL (ref 80–100)
MONOCYTES # BLD: 0.8 K/UL (ref 0–1)
MONOCYTES NFR BLD: 10 %
NEUTROPHILS # BLD AUTO: 5.2 K/UL (ref 1.8–7.7)
NEUTROPHILS NFR BLD: 66 %
OSMOLALITY UR CALC.SUM OF ELEC: 294 MOSM/KG (ref 275–295)
PATIENT FASTING: YES
PLATELET # BLD AUTO: 322 K/UL (ref 140–400)
PMV BLD AUTO: 8.7 FL (ref 7.4–10.3)
POTASSIUM SERPL-SCNC: 3.8 MMOL/L (ref 3.3–5.1)
PROT SERPL-MCNC: 7.1 G/DL (ref 5.9–8.4)
RBC # BLD AUTO: 4 M/UL (ref 3.7–5.4)
SODIUM SERPL-SCNC: 141 MMOL/L (ref 136–144)
TIBC SERPL-MCNC: 293 MCG/DL (ref 228–428)
TRANSFERRIN SERPL-MCNC: 222 MG/DL (ref 192–382)
WBC # BLD AUTO: 7.9 K/UL (ref 4–11)

## 2018-03-08 PROCEDURE — 80053 COMPREHEN METABOLIC PANEL: CPT

## 2018-03-08 PROCEDURE — 82728 ASSAY OF FERRITIN: CPT

## 2018-03-08 PROCEDURE — 86140 C-REACTIVE PROTEIN: CPT

## 2018-03-08 PROCEDURE — 36415 COLL VENOUS BLD VENIPUNCTURE: CPT

## 2018-03-08 PROCEDURE — 85025 COMPLETE CBC W/AUTO DIFF WBC: CPT

## 2018-03-08 PROCEDURE — 83540 ASSAY OF IRON: CPT

## 2018-03-08 PROCEDURE — 87340 HEPATITIS B SURFACE AG IA: CPT

## 2018-03-08 PROCEDURE — 84466 ASSAY OF TRANSFERRIN: CPT

## 2018-03-08 PROCEDURE — 86480 TB TEST CELL IMMUN MEASURE: CPT

## 2018-03-08 PROCEDURE — 86803 HEPATITIS C AB TEST: CPT

## 2018-03-08 PROCEDURE — 85652 RBC SED RATE AUTOMATED: CPT

## 2018-03-08 PROCEDURE — 86704 HEP B CORE ANTIBODY TOTAL: CPT

## 2018-03-08 NOTE — TELEPHONE ENCOUNTER
From: Rusty Caballero  To: Uziel Perez MD  Sent: 3/7/2018 7:03 PM CST  Subject: Other    I'm still having a lot of nausea issues. I'm not yet taking 6 sulfasalazines a day. I take 4. I throw up at least 3 or 4 times a week.  A friend suggested I tina

## 2018-03-09 LAB
M TB IFN-G CD4+ BCKGRND COR BLD-ACNC: 0.01 IU/ML
M TB IFN-G CD4+ T-CELLS BLD-ACNC: 0.03 IU/ML
M TB TUBERC IFN-G BLD QL: NEGATIVE
M TB TUBERC IGNF/MITOGEN IGNF CONTROL: 7.99 IU/ML

## 2018-03-12 ENCOUNTER — PATIENT MESSAGE (OUTPATIENT)
Dept: RHEUMATOLOGY | Facility: CLINIC | Age: 73
End: 2018-03-12

## 2018-03-12 NOTE — TELEPHONE ENCOUNTER
From: Deepa Graves  To: Alexander Rosales MD  Sent: 3/12/2018 11:21 AM CDT  Subject: Non-Urgent Medical Question    I'm still dealing with nausea. I was cleared for hepatitis last week so can I start injections this week?  I think you said once I was t

## 2018-03-13 ENCOUNTER — TELEPHONE (OUTPATIENT)
Dept: RHEUMATOLOGY | Facility: CLINIC | Age: 73
End: 2018-03-13

## 2018-03-13 NOTE — TELEPHONE ENCOUNTER
Can we start prior authorization for cimzia -   I don't know think she signed any papers - she's medicare.

## 2018-03-19 ENCOUNTER — APPOINTMENT (OUTPATIENT)
Dept: LAB | Age: 73
End: 2018-03-19
Attending: INTERNAL MEDICINE
Payer: MEDICARE

## 2018-03-19 ENCOUNTER — OFFICE VISIT (OUTPATIENT)
Dept: INTERNAL MEDICINE CLINIC | Facility: CLINIC | Age: 73
End: 2018-03-19

## 2018-03-19 VITALS
BODY MASS INDEX: 24.6 KG/M2 | HEIGHT: 59 IN | DIASTOLIC BLOOD PRESSURE: 68 MMHG | WEIGHT: 122 LBS | SYSTOLIC BLOOD PRESSURE: 128 MMHG | OXYGEN SATURATION: 96 % | HEART RATE: 72 BPM | TEMPERATURE: 97 F

## 2018-03-19 DIAGNOSIS — M06.00 RHEUMATOID ARTHRITIS WITH NEGATIVE RHEUMATOID FACTOR, INVOLVING UNSPECIFIED SITE (HCC): ICD-10-CM

## 2018-03-19 DIAGNOSIS — R11.2 NAUSEA AND VOMITING, INTRACTABILITY OF VOMITING NOT SPECIFIED, UNSPECIFIED VOMITING TYPE: Primary | ICD-10-CM

## 2018-03-19 DIAGNOSIS — E11.9 DIABETES MELLITUS TYPE 2 WITHOUT RETINOPATHY (HCC): ICD-10-CM

## 2018-03-19 DIAGNOSIS — D50.9 IRON DEFICIENCY ANEMIA, UNSPECIFIED IRON DEFICIENCY ANEMIA TYPE: ICD-10-CM

## 2018-03-19 DIAGNOSIS — I10 ESSENTIAL HYPERTENSION: ICD-10-CM

## 2018-03-19 DIAGNOSIS — E53.8 VITAMIN B12 DEFICIENCY: ICD-10-CM

## 2018-03-19 PROCEDURE — G0009 ADMIN PNEUMOCOCCAL VACCINE: HCPCS | Performed by: INTERNAL MEDICINE

## 2018-03-19 PROCEDURE — 36415 COLL VENOUS BLD VENIPUNCTURE: CPT

## 2018-03-19 PROCEDURE — G0463 HOSPITAL OUTPT CLINIC VISIT: HCPCS | Performed by: INTERNAL MEDICINE

## 2018-03-19 PROCEDURE — 83036 HEMOGLOBIN GLYCOSYLATED A1C: CPT

## 2018-03-19 PROCEDURE — 99214 OFFICE O/P EST MOD 30 MIN: CPT | Performed by: INTERNAL MEDICINE

## 2018-03-19 PROCEDURE — 90732 PPSV23 VACC 2 YRS+ SUBQ/IM: CPT | Performed by: INTERNAL MEDICINE

## 2018-03-19 RX ORDER — FERROUS SULFATE 325(65) MG
325 TABLET ORAL DAILY
Qty: 1 TABLET | Refills: 0 | COMMUNITY
Start: 2018-03-19 | End: 2018-05-17

## 2018-03-19 RX ORDER — SULFASALAZINE 500 MG/1
1000 TABLET ORAL 3 TIMES DAILY
Qty: 1 TABLET | Refills: 0 | COMMUNITY
Start: 2018-03-19 | End: 2018-10-17

## 2018-03-19 RX ORDER — PANTOPRAZOLE SODIUM 40 MG/1
40 TABLET, DELAYED RELEASE ORAL
Qty: 90 TABLET | Refills: 3 | Status: SHIPPED | OUTPATIENT
Start: 2018-03-19 | End: 2018-05-17

## 2018-03-19 NOTE — PROGRESS NOTES
Sandie Oleary is a 71year old female who presents for     3 month check    Pt sent email on 3/7/18-nausea after her meds. She thought it may be the sulfasalazine. \"I've been nauseated a couple months. I was vomiting about 3 to 4 times a week.  I'm not cold sores     takes acyclovir prn (Dr Ave Banerjee rx in about 2014)   • Rheumatoid arthritis [facility]) 2010    sees Dr Wilmer Woods   • Type 2 diabetes mellitus ([facility] 2005    oral medication.    • Vitamin B12 deficiency 07/2017    Vitamin B12 level low at 126 on 7/2 kg)   SpO2 96%   BMI 24.64 kg/m²       Wt Readings from Last 6 Encounters:  03/19/18 : 122 lb (55.3 kg)  02/07/18 : 130 lb (59 kg)  12/19/17 : 135 lb (61.2 kg)  12/18/17 : 136 lb (61.7 kg)  10/06/17 : 138 lb (62.6 kg)  09/21/17 : 140 lb (63.5 kg)      Gene and arava (lefunomide).      Elevated globulin level -globulin 4.4 on 7/28/17.  HECTOR/SPE-no monoclonal proteins.      Healthcare maintenance:  Vaccines-pneumovax 23 Dr. Tito Lora per pt--pt rec'd call from [place], due for pnvx 23--give today 3/19/18, Prevnar hydrochlorothiazide 12.5 MG Oral Tab Take 1 tablet by mouth  daily Disp: 90 tablet Rfl: 3   Metoprolol Tartrate 50 MG Oral Tab Take 1 tablet (50 mg total) by mouth 2 (two) times daily.  Disp: 180 tablet Rfl: 3   acyclovir 400 MG Oral Tab Take 1 tablet (40

## 2018-03-20 ENCOUNTER — APPOINTMENT (OUTPATIENT)
Dept: HEMATOLOGY/ONCOLOGY | Facility: HOSPITAL | Age: 73
End: 2018-03-20
Attending: INTERNAL MEDICINE
Payer: MEDICARE

## 2018-03-20 LAB — HBA1C MFR BLD: 5.7 % (ref 4–6)

## 2018-03-21 ENCOUNTER — TELEPHONE (OUTPATIENT)
Dept: INTERNAL MEDICINE CLINIC | Facility: CLINIC | Age: 73
End: 2018-03-21

## 2018-03-22 ENCOUNTER — NURSE ONLY (OUTPATIENT)
Dept: INTERNAL MEDICINE CLINIC | Facility: CLINIC | Age: 73
End: 2018-03-22

## 2018-03-22 DIAGNOSIS — E53.8 VITAMIN B 12 DEFICIENCY: Primary | ICD-10-CM

## 2018-03-22 PROCEDURE — 96372 THER/PROPH/DIAG INJ SC/IM: CPT | Performed by: INTERNAL MEDICINE

## 2018-03-22 RX ADMIN — CYANOCOBALAMIN 1000 MCG: 1000 INJECTION INTRAMUSCULAR; SUBCUTANEOUS at 11:18:00

## 2018-03-22 NOTE — TELEPHONE ENCOUNTER
Patient called back. Relayed Dr. Humberto James message. Patient verbalized understanding. She is happy with result and agreeable to plan.

## 2018-03-23 NOTE — TELEPHONE ENCOUNTER
[facility] [phone] denied Cimzia. Preferred formulary: Humira SQ syringes, Enbrel SQ syringes, or Remicade IV. Please advise.

## 2018-03-23 NOTE — TELEPHONE ENCOUNTER
Talked to rodrigo vines [employer]ustion   - would [facility] cover cimzia if [employer] didn't?

## 2018-03-26 ENCOUNTER — OFFICE VISIT (OUTPATIENT)
Dept: HEMATOLOGY/ONCOLOGY | Facility: HOSPITAL | Age: 73
End: 2018-03-26
Attending: INTERNAL MEDICINE
Payer: MEDICARE

## 2018-03-26 VITALS
BODY MASS INDEX: 24.19 KG/M2 | RESPIRATION RATE: 16 BRPM | DIASTOLIC BLOOD PRESSURE: 70 MMHG | SYSTOLIC BLOOD PRESSURE: 147 MMHG | HEART RATE: 81 BPM | WEIGHT: 120 LBS | HEIGHT: 59 IN | TEMPERATURE: 97 F

## 2018-03-26 DIAGNOSIS — D64.9 NORMOCYTIC ANEMIA: Primary | ICD-10-CM

## 2018-03-26 PROCEDURE — 99214 OFFICE O/P EST MOD 30 MIN: CPT | Performed by: INTERNAL MEDICINE

## 2018-03-26 NOTE — PROGRESS NOTES
Hematology Progress Note    Patient Name: Amparo Curiel   YOB: 1945   Medical Record Number: [id number]   CSN: [id number]   Consulting Physician: Raji Chan MD  Referring Physician(s): Olivia Cedeño  Date of Visit: 03/26/2018     Ra 3/2007    • Cataracts, bilateral 2004    OU; sees Dr. 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     • Hyperlipidemia, mixed    • Hypertension, esse Comment: moderate use    Drug use: No    Sexual activity: Not on file     Other Topics Concern    Caffeine Concern No     Social History Narrative    Yandel Buitrago is  x 40 yrs; mother of 3 adult children.  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 Eyes: Negative for visual disturbance, irritation and redness. 03/08/2018 08:53 AM   HGB 11.9 (L) 03/08/2018 08:53 AM   HCT 36.2 03/08/2018 08:53 AM   MCV 90.5 03/08/2018 08:53 AM   MCH 29.7 03/08/2018 08:53 AM   MCHC 32.8 03/08/2018 08:53 AM   RDW 14.6 03/08/2018 08:53 AM    03/08/2018 08:53 AM         Lab Res injections; advised she does not require oral therapy as well    --Patient's lowered ferritin level with slightly decreased iron saturation concerning for iron deficiency anemia.   Again this appears to likely be dietary induced as she is undergone extensiv

## 2018-03-28 NOTE — TELEPHONE ENCOUNTER
Pt would be covered under buy and bill. Her [place] [place] will cover buy and bill the 20% Medicare does not cover. It will not over the deductible ($183 which has been met), it will not cover prefilled syringes. Sorry for the confusion.

## 2018-04-06 ENCOUNTER — PATIENT MESSAGE (OUTPATIENT)
Dept: INTERNAL MEDICINE CLINIC | Facility: CLINIC | Age: 73
End: 2018-04-06

## 2018-04-06 RX ORDER — ONDANSETRON 4 MG/1
4 TABLET, FILM COATED ORAL EVERY 12 HOURS PRN
Qty: 20 TABLET | Refills: 0 | Status: SHIPPED | OUTPATIENT
Start: 2018-04-06 | End: 2018-04-23

## 2018-04-06 NOTE — TELEPHONE ENCOUNTER
From: Gilberto Mckinley  To: Tutu Murcia MD  Sent: 4/6/2018 3:52 PM CDT  Subject: Prescription Question    I have been taking the anti-nausea prescription Pantoprazole for about 2 weeks. It doesn't actually take the nausea completely away.  The first wee

## 2018-04-06 NOTE — TELEPHONE ENCOUNTER
I sent patient an email response– (see below). Also sent Rx to [facility] in [facility] for Zofran 4 mg every 12 hours as needed nausea #20.   Sylvia Richardson, I recommend you see your gastroenterologist Dr. Angel Fernandez again.  In the meantime, we can give a s

## 2018-04-23 ENCOUNTER — PATIENT MESSAGE (OUTPATIENT)
Dept: INTERNAL MEDICINE CLINIC | Facility: CLINIC | Age: 73
End: 2018-04-23

## 2018-04-23 RX ORDER — ONDANSETRON 4 MG/1
4 TABLET, FILM COATED ORAL EVERY 12 HOURS PRN
Qty: 20 TABLET | Refills: 1 | Status: SHIPPED | OUTPATIENT
Start: 2018-04-23 | End: 2018-09-18

## 2018-04-23 NOTE — TELEPHONE ENCOUNTER
From: Louie Delgado  To: Ana María Monge MD  Sent: 4/23/2018 7:35 AM CDT  Subject: Prescription Question    The Ondansetron you prescribed for my nausea has helped tremendously. Could you give me a refill from [address] [place] [address]?  I did make an appointment with

## 2018-04-25 ENCOUNTER — NURSE ONLY (OUTPATIENT)
Dept: INTERNAL MEDICINE CLINIC | Facility: CLINIC | Age: 73
End: 2018-04-25

## 2018-04-25 DIAGNOSIS — E53.8 VITAMIN B12 DEFICIENCY: Primary | ICD-10-CM

## 2018-04-25 PROCEDURE — 96372 THER/PROPH/DIAG INJ SC/IM: CPT | Performed by: INTERNAL MEDICINE

## 2018-04-25 RX ADMIN — CYANOCOBALAMIN 1000 MCG: 1000 INJECTION INTRAMUSCULAR; SUBCUTANEOUS at 12:00:00

## 2018-04-25 NOTE — PROGRESS NOTES
Patient presents for Vitamin B12 injection. Patient's full name and  verified. Order verified. Vitamin b12 injected IM to left deltoid per patient's preference. Patient tolerated the procedure well. No adverse reactions noted at this time.

## 2018-05-17 ENCOUNTER — OFFICE VISIT (OUTPATIENT)
Dept: GASTROENTEROLOGY | Facility: CLINIC | Age: 73
End: 2018-05-17

## 2018-05-17 VITALS
HEART RATE: 70 BPM | SYSTOLIC BLOOD PRESSURE: 140 MMHG | DIASTOLIC BLOOD PRESSURE: 72 MMHG | WEIGHT: 113 LBS | HEIGHT: 59 IN | BODY MASS INDEX: 22.78 KG/M2

## 2018-05-17 DIAGNOSIS — R11.2 NON-INTRACTABLE VOMITING WITH NAUSEA, UNSPECIFIED VOMITING TYPE: Primary | ICD-10-CM

## 2018-05-17 DIAGNOSIS — R63.4 WEIGHT LOSS: ICD-10-CM

## 2018-05-17 PROCEDURE — 99214 OFFICE O/P EST MOD 30 MIN: CPT | Performed by: INTERNAL MEDICINE

## 2018-05-17 RX ORDER — HYDROCHLOROTHIAZIDE 12.5 MG/1
TABLET ORAL
Qty: 90 TABLET | Refills: 3 | Status: SHIPPED | OUTPATIENT
Start: 2018-05-17 | End: 2019-05-15

## 2018-05-17 NOTE — PROGRESS NOTES
[facility] - Gastroenterology                                                                                                  Clinic Progress Note    Patient pr Osteopenia     dexa 2008-osteopenia. 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     • Recurrent cold sores     takes acyclovir prn (Dr Devonte Rowell rx in about 2014)   • Rheumatoid arthritis [facility]) 2010    sees Dr Chinmay Alford mouth daily. Disp: 1 tablet Rfl: 0   Pantoprazole Sodium (PROTONIX) 40 MG Oral Tab EC Take 1 tablet (40 mg total) by mouth every morning before breakfast. Disp: 90 tablet Rfl: 3   leflunomide 20 MG Oral Tab Take 1 tablet (20 mg total) by mouth daily.  (Blanche Alert, Oriented X 3  Skin: no rashes, bruises  Psych: normal affect    Labs/Imaging:     Patient's labs and imaging were reviewed and discussed with patient today.       .  ASSESSMENT/PLAN:   Lani Hidalgo is a 71year old year-old woman with hx of iron de

## 2018-05-17 NOTE — PATIENT INSTRUCTIONS
1. Continue your ondansetron/zofran    2. Please let me know if you need any refills    3. If you're not getting better or loosing more weight, please let me know. I'd like you to get the CT scan we talked about.     4. Call me with any questions

## 2018-05-18 ENCOUNTER — OFFICE VISIT (OUTPATIENT)
Dept: RHEUMATOLOGY | Facility: CLINIC | Age: 73
End: 2018-05-18

## 2018-05-18 ENCOUNTER — TELEPHONE (OUTPATIENT)
Dept: RHEUMATOLOGY | Facility: CLINIC | Age: 73
End: 2018-05-18

## 2018-05-18 VITALS
HEART RATE: 73 BPM | DIASTOLIC BLOOD PRESSURE: 65 MMHG | BODY MASS INDEX: 22.78 KG/M2 | SYSTOLIC BLOOD PRESSURE: 115 MMHG | HEIGHT: 59 IN | WEIGHT: 113 LBS

## 2018-05-18 DIAGNOSIS — M06.9 RHEUMATOID ARTHRITIS INVOLVING MULTIPLE SITES, UNSPECIFIED RHEUMATOID FACTOR PRESENCE: Primary | ICD-10-CM

## 2018-05-18 DIAGNOSIS — Z51.81 THERAPEUTIC DRUG MONITORING: ICD-10-CM

## 2018-05-18 PROCEDURE — 96372 THER/PROPH/DIAG INJ SC/IM: CPT | Performed by: INTERNAL MEDICINE

## 2018-05-18 PROCEDURE — 99214 OFFICE O/P EST MOD 30 MIN: CPT | Performed by: INTERNAL MEDICINE

## 2018-05-18 PROCEDURE — G0463 HOSPITAL OUTPT CLINIC VISIT: HCPCS | Performed by: INTERNAL MEDICINE

## 2018-05-18 NOTE — TELEPHONE ENCOUNTER
Plan to start cimzia - ok to start her on this - she chava benefits investigation in 3/2018 - is that paper work ok

## 2018-05-18 NOTE — PROGRESS NOTES
Joyce Vaughan is a 71year old female. HPI:   Patient presents with:  Rheumatoid Arthritis  Finger Pain: swelling      I had the pleasure of seeing Shannan Santiago on 5/18/2018. I hadlas tseen her on 2/7/2018. I had last seen her on 10/6/2017.  I had last 12/12/2016  She is still doing her water aerbics. occl her righ thand she gets numb and stiffness. It takes a whiel somtesim. It's not severe. No flares ups. She's been on leflunomide 10mg a day.  She may need to switch b/c it will be more expensi hurt. She gets nauseaous once in awhile. She is feeling like she did better on methotrexate - it wasn't so bad. The leflunomide is just as expensive as methotrexate. 5/18/2018  She's on leflunoimde 20mg a day now.    sh'es being albe to gradually buil by mouth daily.  (Patient taking differently: Take 20 mg by mouth 2 (two) times daily.  ) Disp: 90 tablet Rfl: 1   SIMVASTATIN 40 MG Oral Tab TAKE 1 TABLET EVERY NIGHT Disp: 90 tablet Rfl: 3   MetFORMIN HCl 1000 MG Oral Tab Take 1 tablet (1,000 mg total) by Glucose      70 - 99 mg/dL 107 (H)   Sodium      136 - 144 mmol/L 139   Potassium      3.3 - 5.1 mmol/L 4.1   Chloride      95 - 110 mmol/L 103   Carbon Dioxide, Total      22 - 32 mmol/L 26   BUN      8 - 20 mg/dL 12   CREATININE      0.50 - 1.50 mg/dL Latest Ref Rng & Units 9/12/2017   C-REACTIVE PROTEIN      0.0 - 0.9 mg/dL 2.9 (H)   SED RATE      0 - 30 mm/Hr 70 (H)     Component      Latest Ref Rng & Units 3/19/2018 3/8/2018   Glucose      70 - 99 mg/dL  147 (H)   Sodium      136 - 144 mmol/L  141 TB Gold NIL      IU/mL  0.033   Quantiferon TB Gold TB-NIL      IU/mL  0.006   Quantiferon TB Gold IRENE-NIL      IU/mL  7.986   QTB. RESULT      Negative  Negative   FERRITIN      11 - 307 ng/mL  62   C-REACTIVE PROTEIN      0.0 - 0.9 mg/dL  1.1 (H)   SED RA

## 2018-05-18 NOTE — PROGRESS NOTES
Name and  verified. Pt aware she was to receive injection of Cimza. Injections given and pt tolerated well. Possible local side effects discussed with pt. Educational materials given to patient. Pt aware to follow up in 2 weeks for next injection.  Pt to

## 2018-05-18 NOTE — PATIENT INSTRUCTIONS
1. Cont. Sulfasalazine -  1000mg three tiems day    2. Cont.  lelfunomide  20mg a day -    3. Start cimzi 400mg a month.today   4. Return to clinic in 2 months. 5.   Info on cimzia   Certolizumab pegol injection  Brand Name: Yesy Randhawa  What is this medicine Side effects that usually do not require medical attention (report to your doctor or health care professional if they continue or are bothersome):  · irritation at site where injected  What may interact with this medicine?   Do not take this medicine with a Visit your doctor or health care professional for regular checks on your progress. Tell your doctor or healthcare professional if your symptoms do not start to get better or if they get worse.  Your condition will be monitored carefully while you are receiv Stephon Cinnamon will be given to you by the pharmacist with each prescription and refill. Be sure to read this information carefully each time. Talk to your pediatrician regarding the use of this medicine in children. Special care may be needed.   What What should I tell my health care provider before I take this medicine?   They need to know if you have any of these conditions:  · diabetes  · heart disease  · hepatitis B or history of hepatitis B infection  · immune system problems  · infection or histor

## 2018-05-24 ENCOUNTER — NURSE ONLY (OUTPATIENT)
Dept: INTERNAL MEDICINE CLINIC | Facility: CLINIC | Age: 73
End: 2018-05-24

## 2018-05-24 DIAGNOSIS — E53.8 VITAMIN B12 DEFICIENCY: Primary | ICD-10-CM

## 2018-05-24 PROCEDURE — 96372 THER/PROPH/DIAG INJ SC/IM: CPT | Performed by: INTERNAL MEDICINE

## 2018-05-24 RX ADMIN — CYANOCOBALAMIN 1000 MCG: 1000 INJECTION INTRAMUSCULAR; SUBCUTANEOUS at 10:56:00

## 2018-05-24 NOTE — PROGRESS NOTES
.Patient presents today for monthly Vitamin B 12 injection. Patient's full name and  verified. Order verified. Vitamin b12 administered to patient's Left deltoid per patient's preference. Patient tolerated the procedure well.  No adverse reactions note

## 2018-06-01 ENCOUNTER — NURSE ONLY (OUTPATIENT)
Dept: RHEUMATOLOGY | Facility: CLINIC | Age: 73
End: 2018-06-01

## 2018-06-01 DIAGNOSIS — M06.9 RHEUMATOID ARTHRITIS INVOLVING MULTIPLE SITES, UNSPECIFIED RHEUMATOID FACTOR PRESENCE: Primary | ICD-10-CM

## 2018-06-01 PROCEDURE — 96372 THER/PROPH/DIAG INJ SC/IM: CPT | Performed by: INTERNAL MEDICINE

## 2018-06-01 NOTE — PROGRESS NOTES
Name and  verified. Pt aware she was to receive injection of Cimza. Injections given and pt tolerated well. Possible local side effects discussed with pt.  Pt aware to follow up in 2 weeks for next injection and to follow up with provider in 1 month afte

## 2018-06-15 ENCOUNTER — NURSE ONLY (OUTPATIENT)
Dept: RHEUMATOLOGY | Facility: CLINIC | Age: 73
End: 2018-06-15

## 2018-06-15 DIAGNOSIS — M06.9 RHEUMATOID ARTHRITIS, INVOLVING UNSPECIFIED SITE, UNSPECIFIED RHEUMATOID FACTOR PRESENCE: Primary | ICD-10-CM

## 2018-06-15 PROCEDURE — 96372 THER/PROPH/DIAG INJ SC/IM: CPT | Performed by: INTERNAL MEDICINE

## 2018-06-15 NOTE — PROGRESS NOTES
Name and  verified. Pt stated she has tolerated previous injections well. Pt aware she was to receive injection of Cimza. Injections given and pt tolerated well. Patient aware to have labs completed prior to  follow up appointment with provider.

## 2018-06-16 ENCOUNTER — HOSPITAL ENCOUNTER (OUTPATIENT)
Dept: MAMMOGRAPHY | Facility: HOSPITAL | Age: 73
Discharge: HOME | End: 2018-06-16
Attending: INTERNAL MEDICINE
Payer: MEDICARE

## 2018-06-16 DIAGNOSIS — Z12.31 VISIT FOR SCREENING MAMMOGRAM: ICD-10-CM

## 2018-06-16 PROCEDURE — 77067 SCR MAMMO BI INCL CAD: CPT | Performed by: INTERNAL MEDICINE

## 2018-06-18 ENCOUNTER — OFFICE VISIT (OUTPATIENT)
Dept: INTERNAL MEDICINE CLINIC | Facility: CLINIC | Age: 73
End: 2018-06-18

## 2018-06-18 VITALS
TEMPERATURE: 99 F | HEART RATE: 87 BPM | OXYGEN SATURATION: 100 % | SYSTOLIC BLOOD PRESSURE: 120 MMHG | BODY MASS INDEX: 22.67 KG/M2 | DIASTOLIC BLOOD PRESSURE: 66 MMHG | HEIGHT: 58 IN | WEIGHT: 108 LBS

## 2018-06-18 DIAGNOSIS — E11.9 DIABETES MELLITUS TYPE 2 WITHOUT RETINOPATHY (HCC): ICD-10-CM

## 2018-06-18 DIAGNOSIS — D64.9 CHRONIC ANEMIA: ICD-10-CM

## 2018-06-18 DIAGNOSIS — Z95.1 S/P CABG X 3: ICD-10-CM

## 2018-06-18 DIAGNOSIS — I10 ESSENTIAL HYPERTENSION: ICD-10-CM

## 2018-06-18 DIAGNOSIS — M06.00 RHEUMATOID ARTHRITIS WITH NEGATIVE RHEUMATOID FACTOR, INVOLVING UNSPECIFIED SITE (HCC): ICD-10-CM

## 2018-06-18 DIAGNOSIS — R11.0 NAUSEA: ICD-10-CM

## 2018-06-18 DIAGNOSIS — E78.2 HYPERLIPIDEMIA, MIXED: ICD-10-CM

## 2018-06-18 DIAGNOSIS — Z00.00 MEDICARE ANNUAL WELLNESS VISIT, INITIAL: Primary | ICD-10-CM

## 2018-06-18 PROCEDURE — G0463 HOSPITAL OUTPT CLINIC VISIT: HCPCS | Performed by: INTERNAL MEDICINE

## 2018-06-18 PROCEDURE — 99213 OFFICE O/P EST LOW 20 MIN: CPT | Performed by: INTERNAL MEDICINE

## 2018-06-18 PROCEDURE — G0438 PPPS, INITIAL VISIT: HCPCS | Performed by: INTERNAL MEDICINE

## 2018-06-18 NOTE — PROGRESS NOTES
May Cabrera is a 71year old female who presents for     3 month check and medicare annual wellness. Feels good. Lost some wt. Walks 3 mi a day and goes to water aerobics.      Nausea-better. protonix didn't help. Takes zofran occas.  No nausea for l COLONOSCOPY N/A      Comment: Procedure: COLONOSCOPY;  Surgeon: Leticia Rodriguez MD;  Location: [facility] ENDOSCOPY   Family History   Problem Relation Age of Onset   • Heart Disease Father 67     CAD; Cause of death   • Cancer Maternal Aunt [de-identified] without mass or hepatosplenomegaly  Extremities: no edema, no feet ulcers. Neurologic: alert and oriented      ASSESSMENT AND PLAN:     Medicare annual wellness. Nausea and vomiting--resolved. Pt feels if was from adjusting to sulfasalazine dose.  EGD nl Fe sat and ferritin.     Lab 3/19/18-A1c 5.7.     Ret in 3 mo with cmp tsh lipid ua/urine ua A1c. (Dr Sophie Aguilar does CBCs).      Patient expresses understanding of above issues and plan. - COMP METABOLIC PANEL (14);  Future  - ASSAY, THYROID STIM HORM 1 tab. Every day Disp:  Rfl:          Dominick Alamo MD  6/18/2018         General Health     In the past six months, have you lost more than 10 pounds without trying?: 2 - No    Has your appetite been poor?: No    Type of Diet: Balanced (diabetic)    Ho 6/18/2018  2:05 PM  Entry User:  Rani Briones RN  Screening Method:  Questionnaire  I have a problem hearing over the telephone:  No I have trouble following the conversations when two or more people are talking at the same time:  No   I have trouble 03/19/2018 5.7    No flowsheet data found. Fasting Blood Sugar (FSB)Annually   Glucose (mg/dL)   Date Value   03/08/2018 147 (H)   ----------  GLUCOSE (P) (mg/dL)   Date Value   06/15/2016 138 (H)   ---------- No flowsheet data found.    Cardiovascular Medicare Part B) No orders found for this or any previous visit.  Update Immunization Activity if applicable     SPECIFIC DISEASE MONITORING Internal Lab or Procedure External Lab or Procedure   Annual Monitoring of Persistent     Medications (ACE/ARB, digo

## 2018-06-22 ENCOUNTER — TELEPHONE (OUTPATIENT)
Dept: HEMATOLOGY/ONCOLOGY | Facility: HOSPITAL | Age: 73
End: 2018-06-22

## 2018-06-22 NOTE — TELEPHONE ENCOUNTER
Will try to go for cbc Sunday at [facility], lab times provided.

## 2018-06-24 ENCOUNTER — LAB ENCOUNTER (OUTPATIENT)
Dept: LAB | Facility: HOSPITAL | Age: 73
End: 2018-06-24
Attending: INTERNAL MEDICINE
Payer: MEDICARE

## 2018-06-24 DIAGNOSIS — E11.9 DIABETES MELLITUS TYPE 2 WITHOUT RETINOPATHY (HCC): ICD-10-CM

## 2018-06-24 DIAGNOSIS — D64.9 NORMOCYTIC ANEMIA: ICD-10-CM

## 2018-06-24 DIAGNOSIS — I25.810 CORONARY ARTERY DISEASE INVOLVING CORONARY BYPASS GRAFT OF NATIVE HEART WITHOUT ANGINA PECTORIS: ICD-10-CM

## 2018-06-24 PROCEDURE — 85025 COMPLETE CBC W/AUTO DIFF WBC: CPT

## 2018-06-24 PROCEDURE — 84443 ASSAY THYROID STIM HORMONE: CPT

## 2018-06-24 PROCEDURE — 82728 ASSAY OF FERRITIN: CPT

## 2018-06-24 PROCEDURE — 82570 ASSAY OF URINE CREATININE: CPT

## 2018-06-24 PROCEDURE — 84466 ASSAY OF TRANSFERRIN: CPT

## 2018-06-24 PROCEDURE — 36415 COLL VENOUS BLD VENIPUNCTURE: CPT

## 2018-06-24 PROCEDURE — 87086 URINE CULTURE/COLONY COUNT: CPT | Performed by: INTERNAL MEDICINE

## 2018-06-24 PROCEDURE — 80053 COMPREHEN METABOLIC PANEL: CPT

## 2018-06-24 PROCEDURE — 81001 URINALYSIS AUTO W/SCOPE: CPT | Performed by: INTERNAL MEDICINE

## 2018-06-24 PROCEDURE — 83540 ASSAY OF IRON: CPT

## 2018-06-24 PROCEDURE — 83036 HEMOGLOBIN GLYCOSYLATED A1C: CPT

## 2018-06-24 PROCEDURE — 80061 LIPID PANEL: CPT

## 2018-06-24 PROCEDURE — 82043 UR ALBUMIN QUANTITATIVE: CPT

## 2018-06-25 ENCOUNTER — OFFICE VISIT (OUTPATIENT)
Dept: HEMATOLOGY/ONCOLOGY | Facility: HOSPITAL | Age: 73
End: 2018-06-25
Attending: INTERNAL MEDICINE
Payer: MEDICARE

## 2018-06-25 VITALS
WEIGHT: 107 LBS | RESPIRATION RATE: 18 BRPM | DIASTOLIC BLOOD PRESSURE: 50 MMHG | TEMPERATURE: 97 F | BODY MASS INDEX: 21.57 KG/M2 | HEIGHT: 59 IN | HEART RATE: 71 BPM | SYSTOLIC BLOOD PRESSURE: 137 MMHG

## 2018-06-25 DIAGNOSIS — M06.00 SERONEGATIVE RHEUMATOID ARTHRITIS (HCC): ICD-10-CM

## 2018-06-25 DIAGNOSIS — D64.9 NORMOCYTIC ANEMIA: Primary | ICD-10-CM

## 2018-06-25 DIAGNOSIS — I25.810 CORONARY ARTERY DISEASE INVOLVING CORONARY BYPASS GRAFT OF NATIVE HEART WITHOUT ANGINA PECTORIS: ICD-10-CM

## 2018-06-25 PROCEDURE — 99214 OFFICE O/P EST MOD 30 MIN: CPT | Performed by: INTERNAL MEDICINE

## 2018-06-25 NOTE — PROGRESS NOTES
Hematology Progress Note    Patient Name: Perfecto Trevizo   YOB: 1945   Medical Record Number: [id number]   CSN: [id number]   Consulting Physician: Jorge Juárez MD  Referring Physician(s): Oniel Peterson  Date of Visit: 06/25/2018     Ra was intentional weight loss as a desired effect. Denies symptoms of  vomiting, constipation, dark stools or hematochezia. Denies any other symptoms such as memory impairment or neuropathy related to B12.   Review of systems is negative for decreased appeti • sciatica [OTHER] Brother    • 2 sons, 1 daughter Eliza Devries Other    • Asthma Son    • Bleeding Disorders Neg    • Clotting Disorder Neg        Social History:    Social History  Social History   Marital status:   Spouse name: N/A    Years of educa tablet Rfl: 3   Metoprolol Tartrate 50 MG Oral Tab Take 1 tablet (50 mg total) by mouth 2 (two) times daily. Disp: 180 tablet Rfl: 3   acyclovir 400 MG Oral Tab Take 1 tablet (400 mg total) by mouth 3 (three) times daily.  As needed for cold sores Disp: 21 1.499 m (4' 11\")   Wt 48.5 kg (107 lb)   BMI 21.61 kg/m²     Physical Examination:    General: Patient is alert and oriented x 3, not in acute distress. Psych: Relaxed with appropriate questions and responses  HEENT: EOMs intact. Oropharynx is clear.    Daniela Rodriguez disease status post CABG being evaluated for normocytic anemia labs concerning for vitamin B12 deficiency with some suggestion of mild iron deficiency    Plan:    1.) Normocytic Anemia--possibly dietary related B12 and iron deficiency, also might be anemia relatively stable with normal Hematocrit.  Pt has NOT been taking an form of oral iron replacement  --Repeat iron studies pending  --She had some nausea symptoms & weight loss in 5/2018 and saw Dr. Cheri Mcnally who felt these maybe related to sulfasalazine tx

## 2018-06-26 ENCOUNTER — NURSE ONLY (OUTPATIENT)
Dept: INTERNAL MEDICINE CLINIC | Facility: CLINIC | Age: 73
End: 2018-06-26

## 2018-06-26 DIAGNOSIS — E53.8 B12 DEFICIENCY: Primary | ICD-10-CM

## 2018-06-26 PROCEDURE — 96372 THER/PROPH/DIAG INJ SC/IM: CPT | Performed by: INTERNAL MEDICINE

## 2018-06-26 RX ADMIN — CYANOCOBALAMIN 1000 MCG: 1000 INJECTION INTRAMUSCULAR; SUBCUTANEOUS at 10:35:00

## 2018-06-27 ENCOUNTER — TELEPHONE (OUTPATIENT)
Dept: INTERNAL MEDICINE CLINIC | Facility: CLINIC | Age: 73
End: 2018-06-27

## 2018-06-27 NOTE — TELEPHONE ENCOUNTER
To nursing, please tell patient the lab tests I ordered that she did on 6/24/18–blood and urine tests-- results are okay. Thanks. Note to self–   6/24/18–CMP TSH lipid UA with culture reflex/urine MA, A1c–results ok.   Dr. Prince Garcia also did lab 6/24/18– C

## 2018-07-06 RX ORDER — METOPROLOL TARTRATE 50 MG/1
TABLET, FILM COATED ORAL
Qty: 180 TABLET | Refills: 3 | Status: SHIPPED | OUTPATIENT
Start: 2018-07-06 | End: 2019-05-15

## 2018-07-23 ENCOUNTER — OFFICE VISIT (OUTPATIENT)
Dept: RHEUMATOLOGY | Facility: CLINIC | Age: 73
End: 2018-07-23
Payer: MEDICARE

## 2018-07-23 VITALS
DIASTOLIC BLOOD PRESSURE: 82 MMHG | WEIGHT: 106.38 LBS | BODY MASS INDEX: 21.44 KG/M2 | HEIGHT: 59 IN | HEART RATE: 69 BPM | SYSTOLIC BLOOD PRESSURE: 126 MMHG

## 2018-07-23 DIAGNOSIS — M06.9 RHEUMATOID ARTHRITIS, INVOLVING UNSPECIFIED SITE, UNSPECIFIED RHEUMATOID FACTOR PRESENCE: Primary | ICD-10-CM

## 2018-07-23 DIAGNOSIS — Z51.81 THERAPEUTIC DRUG MONITORING: ICD-10-CM

## 2018-07-23 PROCEDURE — 96372 THER/PROPH/DIAG INJ SC/IM: CPT | Performed by: INTERNAL MEDICINE

## 2018-07-23 PROCEDURE — 99214 OFFICE O/P EST MOD 30 MIN: CPT | Performed by: INTERNAL MEDICINE

## 2018-07-23 PROCEDURE — G0463 HOSPITAL OUTPT CLINIC VISIT: HCPCS | Performed by: INTERNAL MEDICINE

## 2018-07-23 RX ORDER — LEFLUNOMIDE 20 MG/1
TABLET ORAL
Qty: 90 TABLET | Refills: 1 | Status: SHIPPED | OUTPATIENT
Start: 2018-07-23 | End: 2018-12-20

## 2018-07-23 NOTE — PROGRESS NOTES
Name and  identified. Cimzia 400mg injection given sucutaneous into left and right lower abdomen. Patient tolerated well.

## 2018-07-23 NOTE — PROGRESS NOTES
Ellie Deleon is a 71year old female. HPI:   Patient presents with:  Rheumatoid Arthritis: Cimzia injection  Hand Pain: finger pain      I had the pleasure of seeing Diandra Allison on 7/23/2018. I had last seen her on  5/18/2018.  I hadlas tseen her on 2 every day. She was on methotrexate 15mg a week. 12/12/2016  She is still doing her water aerbics. occl her righ thand she gets numb and stiffness. It takes a whiel somtesim. It's not severe. No flares ups. She's been on leflunomide 10mg a day. can't close her hand  Her stomach is ok. It doesn't hurt. She gets nauseaous once in awhile. She is feeling like she did better on methotrexate - it wasn't so bad. The leflunomide is just as expensive as methotrexate.      5/18/2018  She's on leflunoimde TWICE DAILY Disp: 180 tablet Rfl: 3   Certolizumab Pegol (CIMZIA SC) Inject 1 Dose into the skin every 30 (thirty) days.  Disp:  Rfl:    HYDROCHLOROTHIAZIDE 12.5 MG Oral Tab TAKE 1 TABLET EVERY DAY Disp: 90 tablet Rfl: 3   Ondansetron HCl (ZOFRAN) 4 mg tabl swelling diffusely, can't close left  hand completely  Can close right ahnd   Right elbow midl contracture deformity   Left foot slighlty swollne - since bypass. No heel pain   No right calf tendnerss.     No tendneress in mtps at this time/   No left hip SPECIFIC GRAVITY      1.002 - 1.035 1.035   PH, URINE      5.0 - 8.0 5.0   PROTEIN (URINE DIPSTICK)      Negative mg/dL 30 (A)   GLUCOSE (URINE DIPSTICK)      Negative mg/dL Negative   KETONES (URINE DIPSTICK)      Negative mg/dL Negative   BILIRUBIN now on lelfunomide 200mg a day and ssz 1000mg tid -not Needing  zofran for her nausea. Started cimzia 400mg in June 2018 -   Doing much better. May need to taper off leflunomide in the future b/c of expense - will wait 6 months on cimzia to see.     -sw

## 2018-07-23 NOTE — PATIENT INSTRUCTIONS
1. Cont. Sulfasalazine -  1000mg three tiems day    2. Cont.  lelfunomide  20mg a day -    3. Cont.  cimzi 400mg a month.today   4. Return to clinic in 3 months.    5. Labs every 3 months -

## 2018-07-23 NOTE — TELEPHONE ENCOUNTER
LOV:7-23  Last Filled:2-7, #90 with 1 refill  Labs:6-24, alt 10 and AST 21  Future Appointments  Date Time Provider William Vergara   7/26/2018 11:00 AM [facility] [place]   8/20/2018 11:00 AM [facility] RN RHEUMATOLOGY [facility]   9/17/20

## 2018-07-27 ENCOUNTER — NURSE ONLY (OUTPATIENT)
Dept: INTERNAL MEDICINE CLINIC | Facility: CLINIC | Age: 73
End: 2018-07-27
Payer: MEDICARE

## 2018-07-27 DIAGNOSIS — E53.8 B12 DEFICIENCY: Primary | ICD-10-CM

## 2018-07-27 PROCEDURE — 96372 THER/PROPH/DIAG INJ SC/IM: CPT | Performed by: INTERNAL MEDICINE

## 2018-07-27 RX ADMIN — CYANOCOBALAMIN 1000 MCG: 1000 INJECTION INTRAMUSCULAR; SUBCUTANEOUS at 11:43:00

## 2018-07-27 NOTE — PROGRESS NOTES
Patient presents for monthly vitamin B12. Name,  and order verified. Vitamin B12 1000 mcg administered IM into right deltoid. Patient tolerated injection well.

## 2018-08-20 ENCOUNTER — NURSE ONLY (OUTPATIENT)
Dept: RHEUMATOLOGY | Facility: CLINIC | Age: 73
End: 2018-08-20
Payer: MEDICARE

## 2018-08-20 DIAGNOSIS — M06.9 RHEUMATOID ARTHRITIS, INVOLVING UNSPECIFIED SITE, UNSPECIFIED RHEUMATOID FACTOR PRESENCE: Primary | ICD-10-CM

## 2018-08-20 PROCEDURE — 96372 THER/PROPH/DIAG INJ SC/IM: CPT | Performed by: INTERNAL MEDICINE

## 2018-08-20 NOTE — PROGRESS NOTES
Name and  verified. Pt aware she was to receive injections of Cimza. Injections given Left and Right Lower Abdomen and pt tolerated well. Possible local side effects discussed with pt.  Pt aware to follow up in 1 month for next injection and to have labs

## 2018-08-24 ENCOUNTER — NURSE ONLY (OUTPATIENT)
Dept: INTERNAL MEDICINE CLINIC | Facility: CLINIC | Age: 73
End: 2018-08-24
Payer: MEDICARE

## 2018-08-24 DIAGNOSIS — E53.8 VITAMIN B 12 DEFICIENCY: Primary | ICD-10-CM

## 2018-08-24 PROCEDURE — 96372 THER/PROPH/DIAG INJ SC/IM: CPT | Performed by: INTERNAL MEDICINE

## 2018-08-24 RX ADMIN — CYANOCOBALAMIN 1000 MCG: 1000 INJECTION INTRAMUSCULAR; SUBCUTANEOUS at 10:52:00

## 2018-09-05 ENCOUNTER — LAB ENCOUNTER (OUTPATIENT)
Dept: LAB | Age: 73
End: 2018-09-05
Attending: INTERNAL MEDICINE
Payer: MEDICARE

## 2018-09-05 DIAGNOSIS — M06.9 RHEUMATOID ARTHRITIS, INVOLVING UNSPECIFIED SITE, UNSPECIFIED RHEUMATOID FACTOR PRESENCE: ICD-10-CM

## 2018-09-05 DIAGNOSIS — I25.810 CORONARY ARTERY DISEASE INVOLVING CORONARY BYPASS GRAFT OF NATIVE HEART WITHOUT ANGINA PECTORIS: ICD-10-CM

## 2018-09-05 DIAGNOSIS — D64.9 NORMOCYTIC ANEMIA: ICD-10-CM

## 2018-09-05 DIAGNOSIS — Z51.81 THERAPEUTIC DRUG MONITORING: ICD-10-CM

## 2018-09-05 LAB
ALT SERPL-CCNC: 10 U/L (ref 14–54)
AST SERPL-CCNC: 18 U/L (ref 15–41)
BASOPHILS # BLD: 0.1 K/UL (ref 0–0.2)
BASOPHILS NFR BLD: 1 %
CREAT SERPL-MCNC: 0.85 MG/DL (ref 0.5–1.5)
CRP SERPL-MCNC: 0.5 MG/DL (ref 0–0.9)
EOSINOPHIL # BLD: 0.3 K/UL (ref 0–0.7)
EOSINOPHIL NFR BLD: 5 %
ERYTHROCYTE [DISTWIDTH] IN BLOOD BY AUTOMATED COUNT: 13.8 % (ref 11–15)
ERYTHROCYTE [SEDIMENTATION RATE] IN BLOOD: 30 MM/HR (ref 0–30)
HCT VFR BLD AUTO: 35.1 % (ref 35–48)
HGB BLD-MCNC: 11.5 G/DL (ref 12–16)
LYMPHOCYTES # BLD: 1.9 K/UL (ref 1–4)
LYMPHOCYTES NFR BLD: 29 %
MCH RBC QN AUTO: 31.4 PG (ref 27–32)
MCHC RBC AUTO-ENTMCNC: 32.7 G/DL (ref 32–37)
MCV RBC AUTO: 96 FL (ref 80–100)
MONOCYTES # BLD: 0.8 K/UL (ref 0–1)
MONOCYTES NFR BLD: 12 %
NEUTROPHILS # BLD AUTO: 3.7 K/UL (ref 1.8–7.7)
NEUTROPHILS NFR BLD: 54 %
PLATELET # BLD AUTO: 276 K/UL (ref 140–400)
PMV BLD AUTO: 8.5 FL (ref 7.4–10.3)
RBC # BLD AUTO: 3.66 M/UL (ref 3.7–5.4)
WBC # BLD AUTO: 6.8 K/UL (ref 4–11)

## 2018-09-05 PROCEDURE — 36415 COLL VENOUS BLD VENIPUNCTURE: CPT

## 2018-09-05 PROCEDURE — 84450 TRANSFERASE (AST) (SGOT): CPT

## 2018-09-05 PROCEDURE — 85025 COMPLETE CBC W/AUTO DIFF WBC: CPT

## 2018-09-05 PROCEDURE — 84460 ALANINE AMINO (ALT) (SGPT): CPT

## 2018-09-05 PROCEDURE — 85652 RBC SED RATE AUTOMATED: CPT

## 2018-09-05 PROCEDURE — 82565 ASSAY OF CREATININE: CPT

## 2018-09-05 PROCEDURE — 86140 C-REACTIVE PROTEIN: CPT

## 2018-09-17 ENCOUNTER — NURSE ONLY (OUTPATIENT)
Dept: RHEUMATOLOGY | Facility: CLINIC | Age: 73
End: 2018-09-17
Payer: MEDICARE

## 2018-09-17 DIAGNOSIS — M06.9 RHEUMATOID ARTHRITIS, INVOLVING UNSPECIFIED SITE, UNSPECIFIED RHEUMATOID FACTOR PRESENCE: Primary | ICD-10-CM

## 2018-09-17 PROCEDURE — 96372 THER/PROPH/DIAG INJ SC/IM: CPT | Performed by: INTERNAL MEDICINE

## 2018-09-17 NOTE — PROGRESS NOTES
Name and  identified  Cimzia 200mg injection given subcutaneous into right and left lower abdomen. Patient tolerated well. Patient will return for next injection in one month.

## 2018-09-18 ENCOUNTER — OFFICE VISIT (OUTPATIENT)
Dept: INTERNAL MEDICINE CLINIC | Facility: CLINIC | Age: 73
End: 2018-09-18
Payer: MEDICARE

## 2018-09-18 VITALS
HEART RATE: 64 BPM | BODY MASS INDEX: 22.25 KG/M2 | DIASTOLIC BLOOD PRESSURE: 68 MMHG | HEIGHT: 58 IN | TEMPERATURE: 97 F | WEIGHT: 106 LBS | SYSTOLIC BLOOD PRESSURE: 138 MMHG

## 2018-09-18 DIAGNOSIS — D64.9 CHRONIC ANEMIA: Primary | ICD-10-CM

## 2018-09-18 DIAGNOSIS — R11.0 NAUSEA: ICD-10-CM

## 2018-09-18 DIAGNOSIS — Z95.1 S/P CABG X 3: ICD-10-CM

## 2018-09-18 DIAGNOSIS — E11.9 DIABETES MELLITUS TYPE 2 WITHOUT RETINOPATHY (HCC): ICD-10-CM

## 2018-09-18 DIAGNOSIS — M06.00 RHEUMATOID ARTHRITIS WITH NEGATIVE RHEUMATOID FACTOR, INVOLVING UNSPECIFIED SITE (HCC): ICD-10-CM

## 2018-09-18 DIAGNOSIS — E53.8 VITAMIN B12 DEFICIENCY: ICD-10-CM

## 2018-09-18 DIAGNOSIS — I10 ESSENTIAL HYPERTENSION: ICD-10-CM

## 2018-09-18 DIAGNOSIS — E78.2 HYPERLIPIDEMIA, MIXED: ICD-10-CM

## 2018-09-18 PROCEDURE — G0008 ADMIN INFLUENZA VIRUS VAC: HCPCS | Performed by: INTERNAL MEDICINE

## 2018-09-18 PROCEDURE — 99214 OFFICE O/P EST MOD 30 MIN: CPT | Performed by: INTERNAL MEDICINE

## 2018-09-18 PROCEDURE — 90653 IIV ADJUVANT VACCINE IM: CPT | Performed by: INTERNAL MEDICINE

## 2018-09-18 PROCEDURE — G0463 HOSPITAL OUTPT CLINIC VISIT: HCPCS | Performed by: INTERNAL MEDICINE

## 2018-09-18 NOTE — PROGRESS NOTES
John Paula is a 68year old female who presents for     3 month check      Feels good.      Nausea-remains gone. None for 3 mo.      Anemia--sees hematologist Dr. Chris Alves.  Getting vit B12 shots monthly here.       Diabetes: home sugars run 106-121.    ESOPHAGOGASTRODUODENOSCOPY (EGD); N/A      Comment:  Performed by Luiz Lewis MD at [address] [place] [address] ENDOSCOPY   Family History   Problem Relation Age of Onset   • Heart Disease Father 67        CAD; Cause of death   • Cancer Maternal Aunt 80        pancreatic    Anemia multifactorial--Fe and B12 defic -chronic disease  Lab 7/28/17-Hgb 10.9, Fe sat 8% with low nl ferritin 15.  Vit B12 level 126. hgb 11.5 on 9/5/18. Vit B12 1000 mcg IM monthly since 8/8/17.     EGD, colonoscopy Dr Isabel Herrera 8/29/17-neg exc sm WITH DIFFERENTIAL WITH PLATELET; Future  - COMP METABOLIC PANEL (14);  Future  - HEMOGLOBIN A1C; Future           Current Outpatient Medications:  METFORMIN HCL 1000 MG Oral Tab TAKE 1 TABLET TWICE DAILY WITH MEALS Disp: 180 tablet Rfl: 3   LEFLUNOMIDE 20 M

## 2018-09-25 ENCOUNTER — NURSE ONLY (OUTPATIENT)
Dept: INTERNAL MEDICINE CLINIC | Facility: CLINIC | Age: 73
End: 2018-09-25
Payer: MEDICARE

## 2018-09-25 DIAGNOSIS — E53.8 B12 DEFICIENCY: Primary | ICD-10-CM

## 2018-09-25 PROCEDURE — 96372 THER/PROPH/DIAG INJ SC/IM: CPT | Performed by: INTERNAL MEDICINE

## 2018-09-25 RX ADMIN — CYANOCOBALAMIN 1000 MCG: 1000 INJECTION INTRAMUSCULAR; SUBCUTANEOUS at 11:24:00

## 2018-09-25 NOTE — PROGRESS NOTES
Name and  verified. B12 administered left deltoid. Pt tolerated w/o complaint. No adverse reaction noted.

## 2018-10-18 RX ORDER — SULFASALAZINE 500 MG/1
TABLET ORAL
Qty: 540 TABLET | Refills: 1 | Status: SHIPPED | OUTPATIENT
Start: 2018-10-18 | End: 2019-05-15

## 2018-10-18 NOTE — TELEPHONE ENCOUNTER
LOV: 7/23/2018  Future Appointments   Date Time Provider William Vergara   10/22/2018 11:00 AM Manjinder Sosa MD [facility]   10/25/2018 11:00 AM [facility] [place]   1/14/2019 11:00 AM Cm Saab MD [facility] E[facility] [place]

## 2018-10-22 ENCOUNTER — NURSE ONLY (OUTPATIENT)
Dept: RHEUMATOLOGY | Facility: CLINIC | Age: 73
End: 2018-10-22
Payer: MEDICARE

## 2018-10-22 DIAGNOSIS — M06.9 RHEUMATOID ARTHRITIS INVOLVING MULTIPLE SITES, UNSPECIFIED RHEUMATOID FACTOR PRESENCE: Primary | ICD-10-CM

## 2018-10-22 PROCEDURE — 96372 THER/PROPH/DIAG INJ SC/IM: CPT | Performed by: INTERNAL MEDICINE

## 2018-10-22 NOTE — PROGRESS NOTES
Patient name and  identified. Cimzia 200mg given subcutaneous into right and left lower abdomen. Patient tolerated well.  Will return in one month for next injection

## 2018-10-26 ENCOUNTER — NURSE ONLY (OUTPATIENT)
Dept: INTERNAL MEDICINE CLINIC | Facility: CLINIC | Age: 73
End: 2018-10-26
Payer: MEDICARE

## 2018-10-26 DIAGNOSIS — E53.8 B12 DEFICIENCY: Primary | ICD-10-CM

## 2018-10-26 PROCEDURE — 96372 THER/PROPH/DIAG INJ SC/IM: CPT | Performed by: INTERNAL MEDICINE

## 2018-10-26 RX ADMIN — CYANOCOBALAMIN 1000 MCG: 1000 INJECTION INTRAMUSCULAR; SUBCUTANEOUS at 10:43:00

## 2018-10-29 ENCOUNTER — OFFICE VISIT (OUTPATIENT)
Dept: RHEUMATOLOGY | Facility: CLINIC | Age: 73
End: 2018-10-29
Payer: MEDICARE

## 2018-10-29 VITALS
BODY MASS INDEX: 21.13 KG/M2 | HEIGHT: 58 IN | SYSTOLIC BLOOD PRESSURE: 124 MMHG | DIASTOLIC BLOOD PRESSURE: 74 MMHG | HEART RATE: 75 BPM | WEIGHT: 100.69 LBS

## 2018-10-29 DIAGNOSIS — M06.9 RHEUMATOID ARTHRITIS INVOLVING MULTIPLE SITES, UNSPECIFIED RHEUMATOID FACTOR PRESENCE: Primary | ICD-10-CM

## 2018-10-29 DIAGNOSIS — Z51.81 THERAPEUTIC DRUG MONITORING: ICD-10-CM

## 2018-10-29 PROCEDURE — 99214 OFFICE O/P EST MOD 30 MIN: CPT | Performed by: INTERNAL MEDICINE

## 2018-10-29 PROCEDURE — G0463 HOSPITAL OUTPT CLINIC VISIT: HCPCS | Performed by: INTERNAL MEDICINE

## 2018-10-29 NOTE — PROGRESS NOTES
Mitzi Vicente is a 68year old female. HPI:   Patient presents with:  Rheumatoid Arthritis  Hand Pain      I had the pleasure of seeing Roman Mcarthur on 7/23/2018. I had last seen her on  5/18/2018. I hadlas tseen her on 2/7/2018.  I had last seen her on methotrexate 15mg a week. 12/12/2016  She is still doing her water aerbics. occl her righ thand she gets numb and stiffness. It takes a whiel somtesim. It's not severe. No flares ups. She's been on leflunomide 10mg a day.  She may need to switch b stomach is ok. It doesn't hurt. She gets nauseaous once in awhile. She is feeling like she did better on methotrexate - it wasn't so bad. The leflunomide is just as expensive as methotrexate. 5/18/2018  She's on leflunoimde 20mg a day now.    sh'es be Valeri   • Type 2 diabetes mellitus ([facility] [address] 2005    oral medication. • Vitamin B12 deficiency 07/2017    Vitamin B12 level low at 126 on 7/28/17.       Social Hx Reviewed   Family Hx Reviewed     Medications (Active prior to today's visit):    Current wrist not tende rwith flexion.    B/l thumbs not tender -   Mild tender in shoulders  But rom inatct   Left 3rd finger mild swelling diffusely, can't close left  hand completely  Can close right ahnd   Right elbow  contracture deformity     Left foot slighl Nonreactive   HEMOGLOBIN A1c      4.0 - 6.0 % 5.7      ASSESSMENT/PLAN:     1. RA (rheumatoid arthritis) ([facility] [address]  .  Seronegative rheumatoid arthritis - mod activie - not in remission any more ,     She's now on lelfunomide 200mg a day and ssz 1000mg tid -no

## 2018-11-19 ENCOUNTER — NURSE ONLY (OUTPATIENT)
Dept: RHEUMATOLOGY | Facility: CLINIC | Age: 73
End: 2018-11-19
Payer: MEDICARE

## 2018-11-19 DIAGNOSIS — M06.9 RHEUMATOID ARTHRITIS INVOLVING MULTIPLE SITES, UNSPECIFIED RHEUMATOID FACTOR PRESENCE: Primary | ICD-10-CM

## 2018-11-19 PROCEDURE — 96372 THER/PROPH/DIAG INJ SC/IM: CPT | Performed by: INTERNAL MEDICINE

## 2018-11-19 NOTE — PROGRESS NOTES
Name and  verified. Pt aware she was to receive injection of Cimza. Injections given to right and left lower abdomen subcutaneously - pt tolerated well. Possible local side effects discussed with pt. Pt aware to follow up in 4 weeks for next injection.

## 2018-11-26 ENCOUNTER — NURSE ONLY (OUTPATIENT)
Dept: INTERNAL MEDICINE CLINIC | Facility: CLINIC | Age: 73
End: 2018-11-26
Payer: MEDICARE

## 2018-11-26 PROCEDURE — 96372 THER/PROPH/DIAG INJ SC/IM: CPT | Performed by: INTERNAL MEDICINE

## 2018-11-26 RX ADMIN — CYANOCOBALAMIN 1000 MCG: 1000 INJECTION INTRAMUSCULAR; SUBCUTANEOUS at 10:36:00

## 2018-12-17 ENCOUNTER — NURSE ONLY (OUTPATIENT)
Dept: RHEUMATOLOGY | Facility: CLINIC | Age: 73
End: 2018-12-17
Payer: MEDICARE

## 2018-12-17 DIAGNOSIS — M06.9 RHEUMATOID ARTHRITIS, INVOLVING UNSPECIFIED SITE, UNSPECIFIED RHEUMATOID FACTOR PRESENCE: Primary | ICD-10-CM

## 2018-12-17 PROCEDURE — 96372 THER/PROPH/DIAG INJ SC/IM: CPT | Performed by: INTERNAL MEDICINE

## 2018-12-17 NOTE — PROGRESS NOTES
Patient here for Cimzia injection name and  identified. Cimzia 200mg given subcutaneous into right and left lower abdomen. Pt tolerated well. Will return for injection next month.

## 2018-12-21 RX ORDER — LEFLUNOMIDE 20 MG/1
TABLET ORAL
Qty: 90 TABLET | Refills: 1 | Status: SHIPPED | OUTPATIENT
Start: 2018-12-21 | End: 2019-07-09

## 2018-12-21 NOTE — TELEPHONE ENCOUNTER
LOV: 10/29/18  Last Refilled:#90, 1rf 7/23/18  Labs:AST 18 9/5/18      Future Appointments   Date Time Provider William Vergara   12/27/2018 11:00 AM [facility] [place]   1/14/2019 11:00 AM Damaris Pierre MD [facility] [place]   1/16/201

## 2018-12-27 ENCOUNTER — NURSE ONLY (OUTPATIENT)
Dept: INTERNAL MEDICINE CLINIC | Facility: CLINIC | Age: 73
End: 2018-12-27
Payer: MEDICARE

## 2018-12-27 DIAGNOSIS — E53.8 B12 DEFICIENCY: Primary | ICD-10-CM

## 2018-12-27 PROCEDURE — 96372 THER/PROPH/DIAG INJ SC/IM: CPT | Performed by: INTERNAL MEDICINE

## 2018-12-27 RX ADMIN — CYANOCOBALAMIN 1000 MCG: 1000 INJECTION INTRAMUSCULAR; SUBCUTANEOUS at 10:55:00

## 2018-12-27 NOTE — PROGRESS NOTES
Here today for B12 Injection     Order found in [facility]     Pt verified name and     Pt tolerated injection well

## 2018-12-31 RX ORDER — SIMVASTATIN 40 MG
TABLET ORAL
Qty: 90 TABLET | Refills: 3 | Status: SHIPPED | OUTPATIENT
Start: 2018-12-31 | End: 2020-01-13

## 2019-01-09 ENCOUNTER — LAB ENCOUNTER (OUTPATIENT)
Dept: LAB | Age: 74
End: 2019-01-09
Attending: INTERNAL MEDICINE
Payer: MEDICARE

## 2019-01-09 DIAGNOSIS — D64.9 CHRONIC ANEMIA: ICD-10-CM

## 2019-01-09 DIAGNOSIS — E11.9 DIABETES MELLITUS TYPE 2 WITHOUT RETINOPATHY (HCC): ICD-10-CM

## 2019-01-09 DIAGNOSIS — Z51.81 THERAPEUTIC DRUG MONITORING: ICD-10-CM

## 2019-01-09 DIAGNOSIS — M06.9 RHEUMATOID ARTHRITIS, INVOLVING UNSPECIFIED SITE, UNSPECIFIED RHEUMATOID FACTOR PRESENCE: ICD-10-CM

## 2019-01-09 LAB
ALBUMIN SERPL BCP-MCNC: 3.7 G/DL (ref 3.5–4.8)
ALBUMIN/GLOB SERPL: 1.2 {RATIO} (ref 1–2)
ALP SERPL-CCNC: 44 U/L (ref 32–100)
ALT SERPL-CCNC: 14 U/L (ref 14–54)
ANION GAP SERPL CALC-SCNC: 12 MMOL/L (ref 0–18)
AST SERPL-CCNC: 23 U/L (ref 15–41)
BASOPHILS # BLD: 0.1 K/UL (ref 0–0.2)
BASOPHILS NFR BLD: 1 %
BILIRUB SERPL-MCNC: 0.4 MG/DL (ref 0.3–1.2)
BUN SERPL-MCNC: 14 MG/DL (ref 8–20)
BUN/CREAT SERPL: 17.7 (ref 10–20)
CALCIUM SERPL-MCNC: 8.8 MG/DL (ref 8.5–10.5)
CHLORIDE SERPL-SCNC: 102 MMOL/L (ref 95–110)
CO2 SERPL-SCNC: 24 MMOL/L (ref 22–32)
CREAT SERPL-MCNC: 0.79 MG/DL (ref 0.5–1.5)
CRP SERPL-MCNC: 0.6 MG/DL (ref 0–0.9)
EOSINOPHIL # BLD: 0.2 K/UL (ref 0–0.7)
EOSINOPHIL NFR BLD: 3 %
ERYTHROCYTE [DISTWIDTH] IN BLOOD BY AUTOMATED COUNT: 13.9 % (ref 11–15)
ERYTHROCYTE [SEDIMENTATION RATE] IN BLOOD: 24 MM/HR (ref 0–30)
EST. AVERAGE GLUCOSE BLD GHB EST-MCNC: 91 MG/DL (ref 68–126)
GLOBULIN PLAS-MCNC: 3.2 G/DL (ref 2.5–3.7)
GLUCOSE SERPL-MCNC: 138 MG/DL (ref 70–99)
HBA1C MFR BLD HPLC: 4.8 % (ref ?–5.7)
HCT VFR BLD AUTO: 34.2 % (ref 35–48)
HGB BLD-MCNC: 11.2 G/DL (ref 12–16)
LYMPHOCYTES # BLD: 2.2 K/UL (ref 1–4)
LYMPHOCYTES NFR BLD: 32 %
MCH RBC QN AUTO: 31.9 PG (ref 27–32)
MCHC RBC AUTO-ENTMCNC: 32.8 G/DL (ref 32–37)
MCV RBC AUTO: 97.2 FL (ref 80–100)
MONOCYTES # BLD: 0.9 K/UL (ref 0–1)
MONOCYTES NFR BLD: 13 %
NEUTROPHILS # BLD AUTO: 3.5 K/UL (ref 1.8–7.7)
NEUTROPHILS NFR BLD: 50 %
OSMOLALITY UR CALC.SUM OF ELEC: 289 MOSM/KG (ref 275–295)
PATIENT FASTING: YES
PLATELET # BLD AUTO: 315 K/UL (ref 140–400)
PMV BLD AUTO: 7.9 FL (ref 7.4–10.3)
POTASSIUM SERPL-SCNC: 4.1 MMOL/L (ref 3.3–5.1)
PROT SERPL-MCNC: 6.9 G/DL (ref 5.9–8.4)
RBC # BLD AUTO: 3.52 M/UL (ref 3.7–5.4)
SODIUM SERPL-SCNC: 138 MMOL/L (ref 136–144)
WBC # BLD AUTO: 6.9 K/UL (ref 4–11)

## 2019-01-09 PROCEDURE — 85652 RBC SED RATE AUTOMATED: CPT

## 2019-01-09 PROCEDURE — 85025 COMPLETE CBC W/AUTO DIFF WBC: CPT

## 2019-01-09 PROCEDURE — 36415 COLL VENOUS BLD VENIPUNCTURE: CPT

## 2019-01-09 PROCEDURE — 80053 COMPREHEN METABOLIC PANEL: CPT

## 2019-01-09 PROCEDURE — 83036 HEMOGLOBIN GLYCOSYLATED A1C: CPT

## 2019-01-09 PROCEDURE — 86140 C-REACTIVE PROTEIN: CPT

## 2019-01-14 ENCOUNTER — OFFICE VISIT (OUTPATIENT)
Dept: INTERNAL MEDICINE CLINIC | Facility: CLINIC | Age: 74
End: 2019-01-14
Payer: MEDICARE

## 2019-01-14 VITALS
OXYGEN SATURATION: 97 % | WEIGHT: 105.13 LBS | BODY MASS INDEX: 22.07 KG/M2 | SYSTOLIC BLOOD PRESSURE: 112 MMHG | DIASTOLIC BLOOD PRESSURE: 66 MMHG | HEART RATE: 82 BPM | HEIGHT: 58 IN | TEMPERATURE: 98 F

## 2019-01-14 DIAGNOSIS — M06.00 RHEUMATOID ARTHRITIS WITH NEGATIVE RHEUMATOID FACTOR, INVOLVING UNSPECIFIED SITE (HCC): ICD-10-CM

## 2019-01-14 DIAGNOSIS — E53.8 B12 DEFICIENCY: ICD-10-CM

## 2019-01-14 DIAGNOSIS — D64.9 CHRONIC ANEMIA: ICD-10-CM

## 2019-01-14 DIAGNOSIS — E11.9 DIABETES MELLITUS TYPE 2 WITHOUT RETINOPATHY (HCC): Primary | ICD-10-CM

## 2019-01-14 DIAGNOSIS — I10 ESSENTIAL HYPERTENSION: ICD-10-CM

## 2019-01-14 DIAGNOSIS — Z95.1 S/P CABG X 3: ICD-10-CM

## 2019-01-14 DIAGNOSIS — E78.2 HYPERLIPIDEMIA, MIXED: ICD-10-CM

## 2019-01-14 PROCEDURE — 99214 OFFICE O/P EST MOD 30 MIN: CPT | Performed by: INTERNAL MEDICINE

## 2019-01-14 PROCEDURE — G0463 HOSPITAL OUTPT CLINIC VISIT: HCPCS | Performed by: INTERNAL MEDICINE

## 2019-01-14 RX ORDER — SENNOSIDES 8.6 MG
1300 CAPSULE ORAL 3 TIMES DAILY
Refills: 1 | COMMUNITY
Start: 2019-01-14

## 2019-01-14 NOTE — PROGRESS NOTES
Gopi Crandall is a 68year old female who presents for     4 month check      Feels good. Diabetes: home sugars recently . No low sugars or feeling sugars low.      HTN; home BPs are not checked.      Anemia--no longer sees hematologist Dr. Vish Enriquez History   Problem Relation Age of Onset   • Heart Disease Father 67        CAD; Cause of death   • Cancer Maternal Aunt [de-identified]        pancreatic   • Macular degeneration Mother    • Other ( age 80 old age) Mother    • Stroke Sister 46        CVA   • Other hepatosplenomegaly  Extremities: no edema, no foot ulcers.    Skin--dry skin patch on lat LLE.--can try HC cream.   Neurologic: alert and oriented      ASSESSMENT AND PLAN:     Anemia multifactorial--Fe and B12 defic -chronic disease  Lab 1/9/19-hgb 11.2--c A1C; Future      Current Outpatient Medications:  Acetaminophen  MG Oral Tab CR Take 2 tablets (1,300 mg total) by mouth 3 (three) times daily.  Disp:  Rfl: 1   MetFORMIN HCl 1000 MG Oral Tab Take 0.5 tablets (500 mg total) by mouth 2 (two) times mariela

## 2019-01-16 ENCOUNTER — OFFICE VISIT (OUTPATIENT)
Dept: RHEUMATOLOGY | Facility: CLINIC | Age: 74
End: 2019-01-16
Payer: MEDICARE

## 2019-01-16 VITALS
WEIGHT: 104 LBS | HEIGHT: 58 IN | DIASTOLIC BLOOD PRESSURE: 75 MMHG | SYSTOLIC BLOOD PRESSURE: 131 MMHG | BODY MASS INDEX: 21.83 KG/M2 | HEART RATE: 64 BPM

## 2019-01-16 DIAGNOSIS — M06.9 RHEUMATOID ARTHRITIS, INVOLVING UNSPECIFIED SITE, UNSPECIFIED RHEUMATOID FACTOR PRESENCE: Primary | ICD-10-CM

## 2019-01-16 DIAGNOSIS — M81.0 AGE-RELATED OSTEOPOROSIS WITHOUT CURRENT PATHOLOGICAL FRACTURE: ICD-10-CM

## 2019-01-16 DIAGNOSIS — Z51.81 THERAPEUTIC DRUG MONITORING: ICD-10-CM

## 2019-01-16 PROCEDURE — G0463 HOSPITAL OUTPT CLINIC VISIT: HCPCS | Performed by: INTERNAL MEDICINE

## 2019-01-16 PROCEDURE — 96372 THER/PROPH/DIAG INJ SC/IM: CPT | Performed by: INTERNAL MEDICINE

## 2019-01-16 PROCEDURE — 99214 OFFICE O/P EST MOD 30 MIN: CPT | Performed by: INTERNAL MEDICINE

## 2019-01-16 NOTE — PROGRESS NOTES
Name and  identified Cimzia 200mg given subcutaneous into right and left lower abdomen. Patient tolerated well.

## 2019-01-16 NOTE — PROGRESS NOTES
Vj Moreland is a 68year old female. HPI:   Patient presents with:  Rheumatoid Arthritis: Cimzia injection  Hand Pain: left      I had the pleasure of seeing Patricio Arlette on 7/23/2018. I had last seen her on  5/18/2018.  I hadlas gageen her on 2/7/2018 day.   She was on methotrexate 15mg a week. 12/12/2016  She is still doing her water aerbics. occl her righ thand she gets numb and stiffness. It takes a whiel somtesim. It's not severe. No flares ups. She's been on leflunomide 10mg a day.  She ma close her hand  Her stomach is ok. It doesn't hurt. She gets nauseaous once in awhile. She is feeling like she did better on methotrexate - it wasn't so bad. The leflunomide is just as expensive as methotrexate.      5/18/2018  She's on leflunoimde 20mg a Hyperlipidemia, mixed    • Hypertension, essential    • Osteopenia     dexa 2008-osteopenia. 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     • Recurrent cold sores     takes acyclovir prn (Dr Rachel Gama rx in about 2014) PHYSICAL EXAM:   HEENT: Clear oropharynx, no oral ulcers, EOM intact, clear sclear, PERRLA, pleasant, no acute distress, no CAD, no neck tendnerness, good ROM,   No rashes  CVS: RRR, no murmurs  RS: CTAB, no crackles, no rhonchi  ABD: Soft No  Non jotn Component      Latest Ref Rng & Units 3/19/2018 3/8/2018   Quantiferon TB Gold NIL      IU/mL  0.033   Quantiferon TB Gold TB-NIL      IU/mL  0.006   Quantiferon TB Gold IRENE-NIL      IU/mL  7.986   QTB. RESULT      Negative  Negative   HEPATITIS B COR and men aged 48 years and older, based on the following:  A hip or vertebral (clinical or morphometric) fracture  T-score ? -2.5 at the femoral neck or spine after appropriate evaluation to exclude secondary causes  Low bone mass (T-score between -1.0 and

## 2019-01-16 NOTE — PATIENT INSTRUCTIONS
1. Cont. Sulfasalazine -  1000mg three tiems day    2. Cont.  lelfunomide  20mg a day -    3. Cont.  cimzi 400mg a month.today   4. Return to clinic in 3-4 months. 5. Labs every 3 -4months -   6. check DEXA

## 2019-01-21 ENCOUNTER — TELEPHONE (OUTPATIENT)
Dept: INTERNAL MEDICINE CLINIC | Facility: CLINIC | Age: 74
End: 2019-01-21

## 2019-01-21 ENCOUNTER — OFFICE VISIT (OUTPATIENT)
Dept: INTERNAL MEDICINE CLINIC | Facility: CLINIC | Age: 74
End: 2019-01-21
Payer: MEDICARE

## 2019-01-21 VITALS
TEMPERATURE: 97 F | OXYGEN SATURATION: 98 % | DIASTOLIC BLOOD PRESSURE: 68 MMHG | SYSTOLIC BLOOD PRESSURE: 100 MMHG | HEART RATE: 72 BPM | BODY MASS INDEX: 22 KG/M2 | WEIGHT: 105 LBS

## 2019-01-21 DIAGNOSIS — M54.32 LEFT SIDED SCIATICA: Primary | ICD-10-CM

## 2019-01-21 PROCEDURE — 99213 OFFICE O/P EST LOW 20 MIN: CPT | Performed by: INTERNAL MEDICINE

## 2019-01-21 PROCEDURE — G0463 HOSPITAL OUTPT CLINIC VISIT: HCPCS | Performed by: INTERNAL MEDICINE

## 2019-01-21 RX ORDER — METHYLPREDNISOLONE 4 MG/1
TABLET ORAL
Qty: 1 KIT | Refills: 0 | Status: SHIPPED | OUTPATIENT
Start: 2019-01-21 | End: 2019-02-15

## 2019-01-21 NOTE — PROGRESS NOTES
Merced Wheeler is a 68year old female who presents for     Sciatica. Onset 3 days ago (Friday 1/18/19), \"sciatic\" pain on the left side. L low back dull pain travels down outer L leg to ankle. No fall or injury. Better w standing and walking.  Gino Alert Mother    • Other ( age 80 old age) Mother    • Stroke Sister 46        CVA   • Other (1 brother, 2 sisters.) Other    • Other (sciatica) Brother    • Other (2 sons, 1 daughter) Other    • Asthma Son    • Bleeding Disorders Neg    • Clotting Disorder N tablets (1,300 mg total) by mouth 3 (three) times daily. Disp:  Rfl: 1   MetFORMIN HCl 1000 MG Oral Tab Take 0.5 tablets (500 mg total) by mouth 2 (two) times daily with meals.  Disp: 180 tablet Rfl: 3   SIMVASTATIN 40 MG Oral Tab TAKE 1 TABLET EVERY NIGHT

## 2019-01-21 NOTE — TELEPHONE ENCOUNTER
Called patient , spoke with spouse per hipaa and relayed RD. F message - verbalized understanding . PSR teressa put patient on schedule for today at 1 pm with DR. Marciano Clark

## 2019-01-21 NOTE — TELEPHONE ENCOUNTER
Please advise - called patient who states that firday night she started having sciatic pain on left side , going al the way to her ankle , intense pain - wants to be seen today - to DR. Mili Gonzalez

## 2019-01-21 NOTE — TELEPHONE ENCOUNTER
Pt hoping to be seen today by Dr Amol Nowak  Had sciatic pain all weekend, from rear end to ankle   Please call to advise  Tasked to nursing

## 2019-01-28 ENCOUNTER — TELEPHONE (OUTPATIENT)
Dept: INTERNAL MEDICINE CLINIC | Facility: CLINIC | Age: 74
End: 2019-01-28

## 2019-01-28 DIAGNOSIS — M54.40 ACUTE LEFT-SIDED LOW BACK PAIN WITH SCIATICA, SCIATICA LATERALITY UNSPECIFIED: Primary | ICD-10-CM

## 2019-01-28 DIAGNOSIS — M54.42 ACUTE MIDLINE LOW BACK PAIN WITH LEFT-SIDED SCIATICA: ICD-10-CM

## 2019-01-28 NOTE — TELEPHONE ENCOUNTER
Pt. Called stating she saw Dr. Brandon Valentine. Last Monday. She has sciatic issues. She was put on steroids and told to call back if she was still having a problem and she would likely need a MRI. The pt is still having pain and looking for direction.   She can be re

## 2019-01-29 NOTE — TELEPHONE ENCOUNTER
To Dr. Mitchell Jones - see below - see below - pain level at present: 6/10. Pt did complete course of steroids and did not notice any improvement in pain.   Pt states Dr. Kimberly Mcneill mentioned (altho not in 1/21/19 note) that MRI would be one possible option if no

## 2019-01-30 NOTE — TELEPHONE ENCOUNTER
Discussed with patient. She has low back pain rating down her left leg. Not getting better with steroids. Little bit better when she walks around but worse when she lays down. Pain radiates down to her foot. No numbness or tingling.   No apparent weakn

## 2019-02-04 ENCOUNTER — HOSPITAL ENCOUNTER (OUTPATIENT)
Dept: MRI IMAGING | Facility: HOSPITAL | Age: 74
Discharge: HOME | End: 2019-02-04
Attending: INTERNAL MEDICINE
Payer: MEDICARE

## 2019-02-04 DIAGNOSIS — M54.42 ACUTE MIDLINE LOW BACK PAIN WITH LEFT-SIDED SCIATICA: ICD-10-CM

## 2019-02-04 PROCEDURE — 72148 MRI LUMBAR SPINE W/O DYE: CPT | Performed by: INTERNAL MEDICINE

## 2019-02-05 ENCOUNTER — TELEPHONE (OUTPATIENT)
Dept: INTERNAL MEDICINE CLINIC | Facility: CLINIC | Age: 74
End: 2019-02-05

## 2019-02-05 NOTE — TELEPHONE ENCOUNTER
Spoke with patient and relayed [facility]. Patient verbalized an understanding and will be able to attend appointment on 2/7. Contact information provided. She will f/u with us if she has any issues obtaining the MRI disc.      Routed to Dr. Armaan Kincaid--

## 2019-02-05 NOTE — TELEPHONE ENCOUNTER
Nursing please call Dr. Ross Hinds office at [phone] at 9 AM which I think when they open. Please let them know that patient will be calling to set up an appointment with one of the neurosurgeons.   I gave her Dr. Ross Hinds and  [facility]'

## 2019-02-05 NOTE — TELEPHONE ENCOUNTER
Upon pt call back - Please inform Brittani Bennett. ..    1. We were able to get her into a Neurologist this week and have scheduled an appt.  for her to see Dr. Patrick Keita this Thursday, Feb. 7th at 1230pm and to please arrive 15 min early for registration, bring her

## 2019-02-08 NOTE — TELEPHONE ENCOUNTER
Discussed with patient. [facility] was able to fill the prescription. I did ask patient to follow-up with Dr. Andreas Arceo the week after next. Dr. Martha Encarnacion per patient said that conservative treatment can be done at first.  Physical therapy. Medication.   He did

## 2019-02-08 NOTE — TELEPHONE ENCOUNTER
Jeremy Best to see Dr. Yashira Nunez 02/07 she was prescribed a medication the pharmacy couldn't read his handwriting they called his office but couldn't get in contact with him Camille Good did as well.  She would like Dr. Reza Dumont to prescribe her some pain medication as she is in

## 2019-02-10 NOTE — TELEPHONE ENCOUNTER
I note results of MRI lumbar 2/4/19--extruded disc fragment for L4 w mass effect on L L5 exiting nerve root. Pt saw Dr Virgie Santillan of neurosurg on 2/7/19. He prescibed medication and PT. He gave rx for LESI if she wishes.

## 2019-02-15 ENCOUNTER — TELEPHONE (OUTPATIENT)
Dept: INTERNAL MEDICINE CLINIC | Facility: CLINIC | Age: 74
End: 2019-02-15

## 2019-02-15 ENCOUNTER — OFFICE VISIT (OUTPATIENT)
Dept: INTERNAL MEDICINE CLINIC | Facility: CLINIC | Age: 74
End: 2019-02-15
Payer: MEDICARE

## 2019-02-15 VITALS
TEMPERATURE: 98 F | BODY MASS INDEX: 22.04 KG/M2 | HEART RATE: 76 BPM | SYSTOLIC BLOOD PRESSURE: 130 MMHG | DIASTOLIC BLOOD PRESSURE: 70 MMHG | WEIGHT: 105 LBS | HEIGHT: 58 IN

## 2019-02-15 DIAGNOSIS — M51.26 LUMBAR HERNIATED DISC: ICD-10-CM

## 2019-02-15 DIAGNOSIS — Z01.818 PRE-OP EXAM: Primary | ICD-10-CM

## 2019-02-15 PROCEDURE — 93005 ELECTROCARDIOGRAM TRACING: CPT | Performed by: INTERNAL MEDICINE

## 2019-02-15 PROCEDURE — 99214 OFFICE O/P EST MOD 30 MIN: CPT | Performed by: INTERNAL MEDICINE

## 2019-02-15 PROCEDURE — 96372 THER/PROPH/DIAG INJ SC/IM: CPT | Performed by: INTERNAL MEDICINE

## 2019-02-15 PROCEDURE — G0463 HOSPITAL OUTPT CLINIC VISIT: HCPCS | Performed by: INTERNAL MEDICINE

## 2019-02-15 PROCEDURE — 93010 ELECTROCARDIOGRAM REPORT: CPT | Performed by: INTERNAL MEDICINE

## 2019-02-15 RX ORDER — GABAPENTIN 100 MG/1
600 CAPSULE ORAL 3 TIMES DAILY
Qty: 1 CAPSULE | Refills: 0 | COMMUNITY
Start: 2019-02-15 | End: 2019-11-22

## 2019-02-15 RX ORDER — GABAPENTIN 100 MG/1
100 CAPSULE ORAL 3 TIMES DAILY
Refills: 0 | COMMUNITY
Start: 2019-02-15 | End: 2019-02-15

## 2019-02-15 RX ADMIN — CYANOCOBALAMIN 1000 MCG: 1000 INJECTION INTRAMUSCULAR; SUBCUTANEOUS at 14:21:00

## 2019-02-15 NOTE — TELEPHONE ENCOUNTER
To nursing, please tell pt EKG done at today's visit shows some nonspecific changes-similar to EKG of 8/31/15. Go ahead and see Dr Janiya Burnett for preop cardiology clearance as we discussed. Thanks. EKG today's visit-Sinus  Rhythm, inc RBBB, NS T abn.

## 2019-02-20 ENCOUNTER — NURSE ONLY (OUTPATIENT)
Dept: RHEUMATOLOGY | Facility: CLINIC | Age: 74
End: 2019-02-20
Payer: MEDICARE

## 2019-02-20 DIAGNOSIS — M06.9 RHEUMATOID ARTHRITIS, INVOLVING UNSPECIFIED SITE, UNSPECIFIED RHEUMATOID FACTOR PRESENCE: Primary | ICD-10-CM

## 2019-02-20 PROCEDURE — 96372 THER/PROPH/DIAG INJ SC/IM: CPT | Performed by: INTERNAL MEDICINE

## 2019-02-20 NOTE — PROGRESS NOTES
Name and  verified. Pt aware she was to receive injection of Cimza. Injections given to right and left lower abdomen subcutaneously and pt tolerated well. Possible local side effects discussed with pt.  Pt aware to follow up in 4 weeks for next injection

## 2019-02-26 ENCOUNTER — ANESTHESIA EVENT (OUTPATIENT)
Dept: SURGERY | Facility: HOSPITAL | Age: 74
End: 2019-02-26
Payer: MEDICARE

## 2019-02-26 ENCOUNTER — APPOINTMENT (OUTPATIENT)
Dept: GENERAL RADIOLOGY | Facility: HOSPITAL | Age: 74
End: 2019-02-26
Attending: NEUROLOGICAL SURGERY
Payer: MEDICARE

## 2019-02-26 ENCOUNTER — ANESTHESIA (OUTPATIENT)
Dept: SURGERY | Facility: HOSPITAL | Age: 74
End: 2019-02-26
Payer: MEDICARE

## 2019-02-26 ENCOUNTER — HOSPITAL ENCOUNTER (OUTPATIENT)
Facility: HOSPITAL | Age: 74
Setting detail: HOSPITAL OUTPATIENT SURGERY
Discharge: HOME | End: 2019-02-26
Attending: NEUROLOGICAL SURGERY | Admitting: NEUROLOGICAL SURGERY
Payer: MEDICARE

## 2019-02-26 VITALS
BODY MASS INDEX: 21.62 KG/M2 | HEART RATE: 73 BPM | RESPIRATION RATE: 12 BRPM | TEMPERATURE: 99 F | OXYGEN SATURATION: 95 % | SYSTOLIC BLOOD PRESSURE: 131 MMHG | HEIGHT: 58 IN | WEIGHT: 103 LBS | DIASTOLIC BLOOD PRESSURE: 64 MMHG

## 2019-02-26 LAB — GLUCOSE BLDC GLUCOMTR-MCNC: 130 MG/DL (ref 70–99)

## 2019-02-26 PROCEDURE — 76000 FLUOROSCOPY <1 HR PHYS/QHP: CPT | Performed by: NEUROLOGICAL SURGERY

## 2019-02-26 PROCEDURE — 01NB0ZZ RELEASE LUMBAR NERVE, OPEN APPROACH: ICD-10-PCS | Performed by: NEUROLOGICAL SURGERY

## 2019-02-26 PROCEDURE — 82962 GLUCOSE BLOOD TEST: CPT

## 2019-02-26 PROCEDURE — 0SB20ZZ EXCISION OF LUMBAR VERTEBRAL DISC, OPEN APPROACH: ICD-10-PCS | Performed by: NEUROLOGICAL SURGERY

## 2019-02-26 RX ORDER — MORPHINE SULFATE 4 MG/ML
4 INJECTION, SOLUTION INTRAMUSCULAR; INTRAVENOUS EVERY 10 MIN PRN
Status: DISCONTINUED | OUTPATIENT
Start: 2019-02-26 | End: 2019-02-26

## 2019-02-26 RX ORDER — PHENYLEPHRINE HCL 10 MG/ML
VIAL (ML) INJECTION AS NEEDED
Status: DISCONTINUED | OUTPATIENT
Start: 2019-02-26 | End: 2019-02-26 | Stop reason: SURG

## 2019-02-26 RX ORDER — ROCURONIUM BROMIDE 10 MG/ML
INJECTION, SOLUTION INTRAVENOUS AS NEEDED
Status: DISCONTINUED | OUTPATIENT
Start: 2019-02-26 | End: 2019-02-26 | Stop reason: SURG

## 2019-02-26 RX ORDER — LIDOCAINE HYDROCHLORIDE 10 MG/ML
INJECTION, SOLUTION EPIDURAL; INFILTRATION; INTRACAUDAL; PERINEURAL AS NEEDED
Status: DISCONTINUED | OUTPATIENT
Start: 2019-02-26 | End: 2019-02-26 | Stop reason: SURG

## 2019-02-26 RX ORDER — METOPROLOL TARTRATE 5 MG/5ML
2.5 INJECTION INTRAVENOUS ONCE
Status: DISCONTINUED | OUTPATIENT
Start: 2019-02-26 | End: 2019-02-26

## 2019-02-26 RX ORDER — EPHEDRINE SULFATE 50 MG/ML
INJECTION, SOLUTION INTRAVENOUS AS NEEDED
Status: DISCONTINUED | OUTPATIENT
Start: 2019-02-26 | End: 2019-02-26 | Stop reason: SURG

## 2019-02-26 RX ORDER — ONDANSETRON 2 MG/ML
INJECTION INTRAMUSCULAR; INTRAVENOUS AS NEEDED
Status: DISCONTINUED | OUTPATIENT
Start: 2019-02-26 | End: 2019-02-26 | Stop reason: SURG

## 2019-02-26 RX ORDER — HYDROCODONE BITARTRATE AND ACETAMINOPHEN 5; 325 MG/1; MG/1
2 TABLET ORAL AS NEEDED
Status: DISCONTINUED | OUTPATIENT
Start: 2019-02-26 | End: 2019-02-26

## 2019-02-26 RX ORDER — DEXTROSE MONOHYDRATE 25 G/50ML
50 INJECTION, SOLUTION INTRAVENOUS
Status: DISCONTINUED | OUTPATIENT
Start: 2019-02-26 | End: 2019-02-26

## 2019-02-26 RX ORDER — SODIUM CHLORIDE, SODIUM LACTATE, POTASSIUM CHLORIDE, CALCIUM CHLORIDE 600; 310; 30; 20 MG/100ML; MG/100ML; MG/100ML; MG/100ML
INJECTION, SOLUTION INTRAVENOUS CONTINUOUS
Status: DISCONTINUED | OUTPATIENT
Start: 2019-02-26 | End: 2019-02-26

## 2019-02-26 RX ORDER — DEXAMETHASONE SODIUM PHOSPHATE 4 MG/ML
VIAL (ML) INJECTION AS NEEDED
Status: DISCONTINUED | OUTPATIENT
Start: 2019-02-26 | End: 2019-02-26 | Stop reason: SURG

## 2019-02-26 RX ORDER — HALOPERIDOL 5 MG/ML
0.25 INJECTION INTRAMUSCULAR ONCE AS NEEDED
Status: DISCONTINUED | OUTPATIENT
Start: 2019-02-26 | End: 2019-02-26

## 2019-02-26 RX ORDER — MORPHINE SULFATE 10 MG/ML
6 INJECTION, SOLUTION INTRAMUSCULAR; INTRAVENOUS EVERY 10 MIN PRN
Status: DISCONTINUED | OUTPATIENT
Start: 2019-02-26 | End: 2019-02-26

## 2019-02-26 RX ORDER — FAMOTIDINE 20 MG/1
20 TABLET ORAL ONCE
Status: DISCONTINUED | OUTPATIENT
Start: 2019-02-26 | End: 2019-02-26 | Stop reason: HOSPADM

## 2019-02-26 RX ORDER — ONDANSETRON 2 MG/ML
4 INJECTION INTRAMUSCULAR; INTRAVENOUS ONCE AS NEEDED
Status: DISCONTINUED | OUTPATIENT
Start: 2019-02-26 | End: 2019-02-26

## 2019-02-26 RX ORDER — ACETAMINOPHEN 500 MG
1000 TABLET ORAL ONCE
Status: DISCONTINUED | OUTPATIENT
Start: 2019-02-26 | End: 2019-02-26 | Stop reason: HOSPADM

## 2019-02-26 RX ORDER — HYDROCODONE BITARTRATE AND ACETAMINOPHEN 5; 325 MG/1; MG/1
1 TABLET ORAL AS NEEDED
Status: DISCONTINUED | OUTPATIENT
Start: 2019-02-26 | End: 2019-02-26

## 2019-02-26 RX ORDER — METOCLOPRAMIDE 10 MG/1
10 TABLET ORAL ONCE
Status: DISCONTINUED | OUTPATIENT
Start: 2019-02-26 | End: 2019-02-26 | Stop reason: HOSPADM

## 2019-02-26 RX ORDER — GLYCOPYRROLATE 0.2 MG/ML
INJECTION INTRAMUSCULAR; INTRAVENOUS AS NEEDED
Status: DISCONTINUED | OUTPATIENT
Start: 2019-02-26 | End: 2019-02-26 | Stop reason: SURG

## 2019-02-26 RX ORDER — MIDAZOLAM HYDROCHLORIDE 1 MG/ML
INJECTION INTRAMUSCULAR; INTRAVENOUS AS NEEDED
Status: DISCONTINUED | OUTPATIENT
Start: 2019-02-26 | End: 2019-02-26 | Stop reason: SURG

## 2019-02-26 RX ORDER — MORPHINE SULFATE 4 MG/ML
2 INJECTION, SOLUTION INTRAMUSCULAR; INTRAVENOUS EVERY 10 MIN PRN
Status: DISCONTINUED | OUTPATIENT
Start: 2019-02-26 | End: 2019-02-26

## 2019-02-26 RX ORDER — NALOXONE HYDROCHLORIDE 0.4 MG/ML
80 INJECTION, SOLUTION INTRAMUSCULAR; INTRAVENOUS; SUBCUTANEOUS AS NEEDED
Status: DISCONTINUED | OUTPATIENT
Start: 2019-02-26 | End: 2019-02-26

## 2019-02-26 RX ORDER — CEFAZOLIN SODIUM/WATER 2 G/20 ML
2 SYRINGE (ML) INTRAVENOUS ONCE
Status: COMPLETED | OUTPATIENT
Start: 2019-02-26 | End: 2019-02-26

## 2019-02-26 RX ADMIN — PHENYLEPHRINE HCL 100 MCG: 10 MG/ML VIAL (ML) INJECTION at 09:27:00

## 2019-02-26 RX ADMIN — PHENYLEPHRINE HCL 100 MCG: 10 MG/ML VIAL (ML) INJECTION at 09:40:00

## 2019-02-26 RX ADMIN — EPHEDRINE SULFATE 10 MG: 50 INJECTION, SOLUTION INTRAVENOUS at 09:21:00

## 2019-02-26 RX ADMIN — PHENYLEPHRINE HCL 100 MCG: 10 MG/ML VIAL (ML) INJECTION at 09:31:00

## 2019-02-26 RX ADMIN — ROCURONIUM BROMIDE 40 MG: 10 INJECTION, SOLUTION INTRAVENOUS at 09:13:00

## 2019-02-26 RX ADMIN — EPHEDRINE SULFATE 10 MG: 50 INJECTION, SOLUTION INTRAVENOUS at 09:17:00

## 2019-02-26 RX ADMIN — CEFAZOLIN SODIUM/WATER 2 G: 2 G/20 ML SYRINGE (ML) INTRAVENOUS at 09:17:00

## 2019-02-26 RX ADMIN — ROCURONIUM BROMIDE 10 MG: 10 INJECTION, SOLUTION INTRAVENOUS at 09:40:00

## 2019-02-26 RX ADMIN — EPHEDRINE SULFATE 10 MG: 50 INJECTION, SOLUTION INTRAVENOUS at 09:25:00

## 2019-02-26 RX ADMIN — SODIUM CHLORIDE, SODIUM LACTATE, POTASSIUM CHLORIDE, CALCIUM CHLORIDE: 600; 310; 30; 20 INJECTION, SOLUTION INTRAVENOUS at 09:06:00

## 2019-02-26 RX ADMIN — DEXAMETHASONE SODIUM PHOSPHATE 8 MG: 4 MG/ML VIAL (ML) INJECTION at 09:13:00

## 2019-02-26 RX ADMIN — LIDOCAINE HYDROCHLORIDE 50 MG: 10 INJECTION, SOLUTION EPIDURAL; INFILTRATION; INTRACAUDAL; PERINEURAL at 09:13:00

## 2019-02-26 RX ADMIN — MIDAZOLAM HYDROCHLORIDE 1 MG: 1 INJECTION INTRAMUSCULAR; INTRAVENOUS at 09:10:00

## 2019-02-26 RX ADMIN — GLYCOPYRROLATE 0.2 MG: 0.2 INJECTION INTRAMUSCULAR; INTRAVENOUS at 09:13:00

## 2019-02-26 RX ADMIN — ONDANSETRON 4 MG: 2 INJECTION INTRAMUSCULAR; INTRAVENOUS at 09:13:00

## 2019-02-26 RX ADMIN — SODIUM CHLORIDE, SODIUM LACTATE, POTASSIUM CHLORIDE, CALCIUM CHLORIDE: 600; 310; 30; 20 INJECTION, SOLUTION INTRAVENOUS at 10:14:00

## 2019-02-26 NOTE — ANESTHESIA POSTPROCEDURE EVALUATION
Patient: Angie Helm    Procedure Summary     Date:  02/26/19 Room / Location:  [address] [place] [address] MAIN OR 07 / [address] [place] [address] MAIN OR    Anesthesia Start:  4854 Anesthesia Stop:      Procedure:  LUMBAR LAMINECTOMY 1 LEVEL (Left Back) Diagnosis:  (lumbar spondylosis)    Surgeon:

## 2019-02-26 NOTE — ANESTHESIA PREPROCEDURE EVALUATION
Anesthesia PreOp Note    HPI:     Balta Herrera is a 68year old female who presents for preoperative consultation requested by: Willy Encarnacion MD    Date of Surgery: 2/26/2019    Procedure(s):  LUMBAR LAMINECTOMY 1 LEVEL  Indication: lumbar spondylosis    R Rheumatoid arthritis [facility]) 2010    sees Dr Kunal Isabel   • Type 2 diabetes mellitus ([facility] 2005    oral medication. • Visual impairment     glasses   • Vitamin B12 deficiency 07/2017    Vitamin B12 level low at 126 on 7/28/17.        Past Surgical History: Inject 2 Doses into the skin every 30 (thirty) days.    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     Social Needs      Financial resource strain: Herb Mazariegos Self-Exams: Not Asked    Social History Narrative      Lia Tejeda is  x 40 yrs; mother of 3 adult children. She is no longer worker; formerly worked in billing at [employer]. She lives in [place] with her spouse, Inge Mccurdy, and her two adult sons.        Available spouse  Discussed plan with:  CRNA and surgeon      I have informed Reyes Burger and/or legal guardian or family member of the nature of the anesthetic plan, benefits, risks including possible dental damage if relevant, major complications, and any alterna

## 2019-02-26 NOTE — ANESTHESIA PROCEDURE NOTES
ANESTHESIA INTUBATION  Date/Time: 2/26/2019 9:15 AM  Urgency: elective      General Information and Staff    Patient location during procedure: OR  Anesthesiologist: Hal Del Toro MD  Resident/CRNA: Milton Brown CRNA  Performed: CRNA     Indication

## 2019-02-26 NOTE — INTERVAL H&P NOTE
Pre-op Diagnosis: lumbar spondylosis    The above referenced H&P was reviewed by Romaine Sher MD on 2/26/2019, the patient was examined and no significant changes have occurred in the patient's condition since the H&P was performed.   I discussed with the p

## 2019-02-26 NOTE — OPERATIVE REPORT
NEUROSURGERY OPERATIVE NOTE     Surgery Date:   02/26/19  Hospital:           [place] [address]  Patient Name:  Sammy Cha  Patient MR#:     [id number]  Surgeon:           Dr. Silvino Banerjee.  Kiko  Co-Surgeon:     None  Assistant:         None     Anesthesia:      GETA and appropriate lines were placed. The patient was moved to the operating table and placed in a prone position. Care was taken to pad all pressure points including shoulders, elbows, wrists, and hips.   Pillows were placed behind the knees and heel of the portion of the L5 pedicle. The L5 nerve root itself was followed until it exited the L5S1 foramen. Closure:             Gelfoam and thrombin were used to obtain hemostasis.    The wound was irrigated with antibiotic solution and once it ran

## 2019-03-22 ENCOUNTER — NURSE ONLY (OUTPATIENT)
Dept: RHEUMATOLOGY | Facility: CLINIC | Age: 74
End: 2019-03-22
Payer: MEDICARE

## 2019-03-22 ENCOUNTER — NURSE ONLY (OUTPATIENT)
Dept: INTERNAL MEDICINE CLINIC | Facility: CLINIC | Age: 74
End: 2019-03-22
Payer: MEDICARE

## 2019-03-22 DIAGNOSIS — M06.9 RHEUMATOID ARTHRITIS, INVOLVING UNSPECIFIED SITE, UNSPECIFIED RHEUMATOID FACTOR PRESENCE: Primary | ICD-10-CM

## 2019-03-22 PROCEDURE — 96372 THER/PROPH/DIAG INJ SC/IM: CPT | Performed by: INTERNAL MEDICINE

## 2019-03-22 RX ORDER — CYANOCOBALAMIN 1000 UG/ML
1000 INJECTION INTRAMUSCULAR; SUBCUTANEOUS ONCE
Status: COMPLETED | OUTPATIENT
Start: 2019-03-22 | End: 2019-03-22

## 2019-03-22 RX ADMIN — CYANOCOBALAMIN 1000 MCG: 1000 INJECTION INTRAMUSCULAR; SUBCUTANEOUS at 15:21:00

## 2019-03-22 NOTE — PROGRESS NOTES
Patient name and  identified. Cimzia 200mg given subcutaneous into right and left lower abdomen. Patient tolerated well.

## 2019-03-23 ENCOUNTER — HOSPITAL ENCOUNTER (OUTPATIENT)
Dept: MRI IMAGING | Facility: HOSPITAL | Age: 74
Discharge: HOME | End: 2019-03-23
Attending: NEUROLOGICAL SURGERY
Payer: MEDICARE

## 2019-03-23 DIAGNOSIS — M47.26 OTHER SPONDYLOSIS WITH RADICULOPATHY, LUMBAR REGION: ICD-10-CM

## 2019-03-23 LAB — CREAT BLD-MCNC: 0.8 MG/DL (ref 0.5–1.5)

## 2019-03-23 PROCEDURE — 72158 MRI LUMBAR SPINE W/O & W/DYE: CPT | Performed by: NEUROLOGICAL SURGERY

## 2019-03-23 PROCEDURE — A9575 INJ GADOTERATE MEGLUMI 0.1ML: HCPCS | Performed by: NEUROLOGICAL SURGERY

## 2019-03-23 PROCEDURE — 82565 ASSAY OF CREATININE: CPT

## 2019-03-27 ENCOUNTER — PATIENT MESSAGE (OUTPATIENT)
Dept: INTERNAL MEDICINE CLINIC | Facility: CLINIC | Age: 74
End: 2019-03-27

## 2019-03-27 NOTE — TELEPHONE ENCOUNTER
From: Balta Herrera  To: Aung Cole MD  Sent: 3/27/2019 5:16 PM CDT  Subject: Test Results Question    I had a L4-L5 laminotomy performed by Dr. Sravanthi Leach on 2/26/19. The pain has not gone away. I had another MRI on 3/23/19.  This shows extension of the [facility]

## 2019-03-29 ENCOUNTER — TELEPHONE (OUTPATIENT)
Dept: INTERNAL MEDICINE CLINIC | Facility: CLINIC | Age: 74
End: 2019-03-29

## 2019-03-29 ENCOUNTER — OFFICE VISIT (OUTPATIENT)
Dept: INTERNAL MEDICINE CLINIC | Facility: CLINIC | Age: 74
End: 2019-03-29
Payer: MEDICARE

## 2019-03-29 ENCOUNTER — LAB ENCOUNTER (OUTPATIENT)
Dept: LAB | Age: 74
End: 2019-03-29
Attending: INTERNAL MEDICINE
Payer: MEDICARE

## 2019-03-29 VITALS
HEIGHT: 58 IN | TEMPERATURE: 98 F | BODY MASS INDEX: 22.25 KG/M2 | HEART RATE: 82 BPM | OXYGEN SATURATION: 98 % | SYSTOLIC BLOOD PRESSURE: 124 MMHG | DIASTOLIC BLOOD PRESSURE: 70 MMHG | WEIGHT: 106 LBS

## 2019-03-29 DIAGNOSIS — Z01.818 PRE-OP EXAM: ICD-10-CM

## 2019-03-29 DIAGNOSIS — Z01.818 PRE-OP EXAM: Primary | ICD-10-CM

## 2019-03-29 DIAGNOSIS — M54.32 SCIATICA, LEFT SIDE: ICD-10-CM

## 2019-03-29 DIAGNOSIS — M51.26 LUMBAR HERNIATED DISC: ICD-10-CM

## 2019-03-29 DIAGNOSIS — M06.9 RHEUMATOID ARTHRITIS, INVOLVING UNSPECIFIED SITE, UNSPECIFIED RHEUMATOID FACTOR PRESENCE: ICD-10-CM

## 2019-03-29 DIAGNOSIS — E11.9 DIABETES MELLITUS TYPE 2 WITHOUT RETINOPATHY (HCC): ICD-10-CM

## 2019-03-29 DIAGNOSIS — Z51.81 THERAPEUTIC DRUG MONITORING: ICD-10-CM

## 2019-03-29 DIAGNOSIS — E11.9 DIABETES MELLITUS TYPE 2 WITHOUT RETINOPATHY (HCC): Primary | ICD-10-CM

## 2019-03-29 LAB
ALBUMIN SERPL-MCNC: 3.5 G/DL (ref 3.4–5)
ALBUMIN/GLOB SERPL: 1 {RATIO} (ref 1–2)
ALP LIVER SERPL-CCNC: 56 U/L (ref 55–142)
ALT SERPL-CCNC: 16 U/L (ref 13–56)
ANION GAP SERPL CALC-SCNC: 5 MMOL/L (ref 0–18)
AST SERPL-CCNC: 16 U/L (ref 15–37)
BASOPHILS # BLD AUTO: 0.09 X10(3) UL (ref 0–0.2)
BASOPHILS NFR BLD AUTO: 1.2 %
BILIRUB SERPL-MCNC: 0.3 MG/DL (ref 0.1–2)
BUN BLD-MCNC: 18 MG/DL (ref 7–18)
BUN/CREAT SERPL: 20 (ref 10–20)
CALCIUM BLD-MCNC: 9.6 MG/DL (ref 8.5–10.1)
CHLORIDE SERPL-SCNC: 106 MMOL/L (ref 98–107)
CO2 SERPL-SCNC: 31 MMOL/L (ref 21–32)
CREAT BLD-MCNC: 0.9 MG/DL (ref 0.55–1.02)
CRP SERPL-MCNC: <0.29 MG/DL (ref ?–0.3)
DEPRECATED RDW RBC AUTO: 50.2 FL (ref 35.1–46.3)
EOSINOPHIL # BLD AUTO: 0.24 X10(3) UL (ref 0–0.7)
EOSINOPHIL NFR BLD AUTO: 3.2 %
ERYTHROCYTE [DISTWIDTH] IN BLOOD BY AUTOMATED COUNT: 13.4 % (ref 11–15)
ERYTHROCYTE [SEDIMENTATION RATE] IN BLOOD: 22 MM/HR (ref 0–30)
GLOBULIN PLAS-MCNC: 3.6 G/DL (ref 2.8–4.4)
GLUCOSE BLD-MCNC: 244 MG/DL (ref 70–99)
HCT VFR BLD AUTO: 36.2 % (ref 35–48)
HGB BLD-MCNC: 11 G/DL (ref 12–16)
IMM GRANULOCYTES # BLD AUTO: 0.04 X10(3) UL (ref 0–1)
IMM GRANULOCYTES NFR BLD: 0.5 %
LYMPHOCYTES # BLD AUTO: 1.8 X10(3) UL (ref 1–4)
LYMPHOCYTES NFR BLD AUTO: 23.9 %
M PROTEIN MFR SERPL ELPH: 7.1 G/DL (ref 6.4–8.2)
MCH RBC QN AUTO: 30.8 PG (ref 26–34)
MCHC RBC AUTO-ENTMCNC: 30.4 G/DL (ref 31–37)
MCV RBC AUTO: 101.4 FL (ref 80–100)
MONOCYTES # BLD AUTO: 0.82 X10(3) UL (ref 0.1–1)
MONOCYTES NFR BLD AUTO: 10.9 %
NEUTROPHILS # BLD AUTO: 4.53 X10 (3) UL (ref 1.5–7.7)
NEUTROPHILS # BLD AUTO: 4.53 X10(3) UL (ref 1.5–7.7)
NEUTROPHILS NFR BLD AUTO: 60.3 %
OSMOLALITY SERPL CALC.SUM OF ELEC: 304 MOSM/KG (ref 275–295)
PLATELET # BLD AUTO: 311 10(3)UL (ref 150–450)
POTASSIUM SERPL-SCNC: 4.4 MMOL/L (ref 3.5–5.1)
RBC # BLD AUTO: 3.57 X10(6)UL (ref 3.8–5.3)
SODIUM SERPL-SCNC: 142 MMOL/L (ref 136–145)
WBC # BLD AUTO: 7.5 X10(3) UL (ref 4–11)

## 2019-03-29 PROCEDURE — 93005 ELECTROCARDIOGRAM TRACING: CPT | Performed by: INTERNAL MEDICINE

## 2019-03-29 PROCEDURE — 86140 C-REACTIVE PROTEIN: CPT

## 2019-03-29 PROCEDURE — 85652 RBC SED RATE AUTOMATED: CPT

## 2019-03-29 PROCEDURE — G0463 HOSPITAL OUTPT CLINIC VISIT: HCPCS | Performed by: INTERNAL MEDICINE

## 2019-03-29 PROCEDURE — 99214 OFFICE O/P EST MOD 30 MIN: CPT | Performed by: INTERNAL MEDICINE

## 2019-03-29 PROCEDURE — 80053 COMPREHEN METABOLIC PANEL: CPT

## 2019-03-29 PROCEDURE — 93010 ELECTROCARDIOGRAM REPORT: CPT | Performed by: INTERNAL MEDICINE

## 2019-03-29 PROCEDURE — 85025 COMPLETE CBC W/AUTO DIFF WBC: CPT

## 2019-03-29 PROCEDURE — 36415 COLL VENOUS BLD VENIPUNCTURE: CPT

## 2019-03-29 NOTE — TELEPHONE ENCOUNTER
To nursing, please tell patient lab done on 3/29/19–CBC and CMP–results are overall okay except elevated blood sugar 244. Repeat fasting blood sugar along with A1c.     Please fax to her neurosurgeon Dr. Lenin Del Castillo at fax number [phone] the following:

## 2019-03-29 NOTE — TELEPHONE ENCOUNTER
Called patient and relayed DR. MASSEY message - verbalized understanding. offic enote from today , EKG and CBC and CMP results faxed to DR. Hoover at [phone]

## 2019-03-29 NOTE — PROGRESS NOTES
Madeline Aquino is a 68year old female who presents for     Check at 3 wks. Here with her . L sciatica    On 2/26/19 had laminectomy with excision of herniated disc, L4-5. Dr Hodan Mari. \"For 2 days it felt better.  We went to [place] 3/7 to 3/13 acyclovir prn (Dr Matthew Lyons rx in about 2014)   • Rheumatoid arthritis [facility]) 2010    sees Dr Britney Patino   • Type 2 diabetes mellitus ([facility] 2005    oral medication.    • Visual impairment     glasses   • Vitamin B12 deficiency 07/2017    Vitamin B12 level low dyspnea  Cardiac: No chest pain or palpitations  Gastrointestinal: No abdominal pain, vomiting, diarrhea or constipation  Genitourinary:  No dysuria or blood in the urine  Dermatologic:  No rash or itching.       EXAM:   /70   Pulse 82   Temp 97.6 °F monthly since 8/8/17 after B12 level 126 in 7/2017.    EGD, colonoscopy Dr Sharyle Flatness 8/29/17-neg exc sm non-bleeding internal hemorrhoids. Video SB capsule endoscopy-9/11/17-neg.     DM type 2- on metformin 500 mg bid (cut dose after A1c 4.8 on 1/9/19).  Gl by mouth 2 (two) times daily with meals.  Disp: 180 tablet Rfl: 3   SIMVASTATIN 40 MG Oral Tab TAKE 1 TABLET EVERY NIGHT Disp: 90 tablet Rfl: 3   LEFLUNOMIDE 20 MG Oral Tab TAKE 1 TABLET EVERY DAY Disp: 90 tablet Rfl: 1   SULFASALAZINE 500 MG Oral Tab TAKE

## 2019-04-01 ENCOUNTER — APPOINTMENT (OUTPATIENT)
Dept: LAB | Age: 74
End: 2019-04-01
Attending: INTERNAL MEDICINE
Payer: MEDICARE

## 2019-04-01 DIAGNOSIS — E11.9 DIABETES MELLITUS TYPE 2 WITHOUT RETINOPATHY (HCC): ICD-10-CM

## 2019-04-01 PROCEDURE — 83036 HEMOGLOBIN GLYCOSYLATED A1C: CPT

## 2019-04-01 PROCEDURE — 36415 COLL VENOUS BLD VENIPUNCTURE: CPT

## 2019-04-01 PROCEDURE — 82947 ASSAY GLUCOSE BLOOD QUANT: CPT

## 2019-04-02 ENCOUNTER — TELEPHONE (OUTPATIENT)
Dept: INTERNAL MEDICINE CLINIC | Facility: CLINIC | Age: 74
End: 2019-04-02

## 2019-04-02 ENCOUNTER — PATIENT MESSAGE (OUTPATIENT)
Dept: INTERNAL MEDICINE CLINIC | Facility: CLINIC | Age: 74
End: 2019-04-02

## 2019-04-02 NOTE — TELEPHONE ENCOUNTER
I spoke with patient and relayed Dr. Lexii Hernandez message. She verbalized understanding. I invited her to call the office with any questions or concerns.

## 2019-04-02 NOTE — TELEPHONE ENCOUNTER
To nursing, please tell patient lab done on 4/1/19–fasting blood sugar 130 and A1c 5.2– indicates good overall sugar control. Please fax results to her neurosurgeon Dr. Chanell Spangler at fax number [phone]. Thanks.

## 2019-04-02 NOTE — TELEPHONE ENCOUNTER
Regarding: Test Results Question  Contact: [phone]  ----- Message from [address] sent at 4/2/2019  4:36 PM CDT -----    I received a phone call from the hospital this afternoon to go over my medical history before surgery (4/9/19)  The nurse I s

## 2019-04-02 NOTE — TELEPHONE ENCOUNTER
To nursing,     she can check with the cardiologist Dr. Krupa Doe about holding the aspirin before surgery.   Usually this is a risks/benefits decision–she is taking it for history of heart disease but the surgeon requires her to be off of it to prevent ble

## 2019-04-02 NOTE — TELEPHONE ENCOUNTER
From: Diandra Allison  To: Porsha Rodriguez MD  Sent: 4/2/2019 4:36 PM CDT  Subject: Test Results Question    I received a phone call from the hospital this afternoon to go over my medical history before surgery (4/9/19)  The nurse I spoke with requested I s

## 2019-04-02 NOTE — TELEPHONE ENCOUNTER
I spoke with patient and relayed Dr. Karin Flores message. She verbalized understanding. Lab results faxed to Dr. Jonah Archer at [phone]. Fax confirmation received.

## 2019-04-09 ENCOUNTER — ANESTHESIA (OUTPATIENT)
Dept: SURGERY | Facility: HOSPITAL | Age: 74
End: 2019-04-09
Payer: MEDICARE

## 2019-04-09 ENCOUNTER — ANESTHESIA EVENT (OUTPATIENT)
Dept: SURGERY | Facility: HOSPITAL | Age: 74
End: 2019-04-09
Payer: MEDICARE

## 2019-04-09 ENCOUNTER — HOSPITAL ENCOUNTER (OUTPATIENT)
Facility: HOSPITAL | Age: 74
Setting detail: HOSPITAL OUTPATIENT SURGERY
Discharge: HOME | End: 2019-04-09
Attending: NEUROLOGICAL SURGERY | Admitting: NEUROLOGICAL SURGERY
Payer: MEDICARE

## 2019-04-09 ENCOUNTER — APPOINTMENT (OUTPATIENT)
Dept: GENERAL RADIOLOGY | Facility: HOSPITAL | Age: 74
End: 2019-04-09
Attending: NEUROLOGICAL SURGERY
Payer: MEDICARE

## 2019-04-09 VITALS
SYSTOLIC BLOOD PRESSURE: 137 MMHG | HEART RATE: 57 BPM | WEIGHT: 105.38 LBS | DIASTOLIC BLOOD PRESSURE: 54 MMHG | TEMPERATURE: 97 F | RESPIRATION RATE: 17 BRPM | BODY MASS INDEX: 22.12 KG/M2 | OXYGEN SATURATION: 96 % | HEIGHT: 58 IN

## 2019-04-09 PROCEDURE — 82962 GLUCOSE BLOOD TEST: CPT

## 2019-04-09 PROCEDURE — 0ST20ZZ RESECTION OF LUMBAR VERTEBRAL DISC, OPEN APPROACH: ICD-10-PCS | Performed by: NEUROLOGICAL SURGERY

## 2019-04-09 PROCEDURE — 76000 FLUOROSCOPY <1 HR PHYS/QHP: CPT | Performed by: NEUROLOGICAL SURGERY

## 2019-04-09 RX ORDER — HYDROCODONE BITARTRATE AND ACETAMINOPHEN 5; 325 MG/1; MG/1
2 TABLET ORAL AS NEEDED
Status: DISCONTINUED | OUTPATIENT
Start: 2019-04-09 | End: 2019-04-09

## 2019-04-09 RX ORDER — ROCURONIUM BROMIDE 10 MG/ML
INJECTION, SOLUTION INTRAVENOUS AS NEEDED
Status: DISCONTINUED | OUTPATIENT
Start: 2019-04-09 | End: 2019-04-09 | Stop reason: SURG

## 2019-04-09 RX ORDER — LIDOCAINE HYDROCHLORIDE 10 MG/ML
INJECTION, SOLUTION EPIDURAL; INFILTRATION; INTRACAUDAL; PERINEURAL AS NEEDED
Status: DISCONTINUED | OUTPATIENT
Start: 2019-04-09 | End: 2019-04-09 | Stop reason: SURG

## 2019-04-09 RX ORDER — PHENYLEPHRINE HCL 10 MG/ML
VIAL (ML) INJECTION AS NEEDED
Status: DISCONTINUED | OUTPATIENT
Start: 2019-04-09 | End: 2019-04-09 | Stop reason: SURG

## 2019-04-09 RX ORDER — ACETAMINOPHEN 500 MG
1000 TABLET ORAL ONCE
Status: COMPLETED | OUTPATIENT
Start: 2019-04-09 | End: 2019-04-09

## 2019-04-09 RX ORDER — DEXTROSE MONOHYDRATE 25 G/50ML
50 INJECTION, SOLUTION INTRAVENOUS
Status: DISCONTINUED | OUTPATIENT
Start: 2019-04-09 | End: 2019-04-09

## 2019-04-09 RX ORDER — NALOXONE HYDROCHLORIDE 0.4 MG/ML
80 INJECTION, SOLUTION INTRAMUSCULAR; INTRAVENOUS; SUBCUTANEOUS AS NEEDED
Status: DISCONTINUED | OUTPATIENT
Start: 2019-04-09 | End: 2019-04-09

## 2019-04-09 RX ORDER — ONDANSETRON 2 MG/ML
INJECTION INTRAMUSCULAR; INTRAVENOUS AS NEEDED
Status: DISCONTINUED | OUTPATIENT
Start: 2019-04-09 | End: 2019-04-09 | Stop reason: SURG

## 2019-04-09 RX ORDER — METOPROLOL TARTRATE 5 MG/5ML
2.5 INJECTION INTRAVENOUS ONCE
Status: DISCONTINUED | OUTPATIENT
Start: 2019-04-09 | End: 2019-04-09

## 2019-04-09 RX ORDER — MORPHINE SULFATE 2 MG/ML
2 INJECTION, SOLUTION INTRAMUSCULAR; INTRAVENOUS EVERY 10 MIN PRN
Status: DISCONTINUED | OUTPATIENT
Start: 2019-04-09 | End: 2019-04-09

## 2019-04-09 RX ORDER — DEXAMETHASONE SODIUM PHOSPHATE 4 MG/ML
VIAL (ML) INJECTION AS NEEDED
Status: DISCONTINUED | OUTPATIENT
Start: 2019-04-09 | End: 2019-04-09 | Stop reason: SURG

## 2019-04-09 RX ORDER — ONDANSETRON 2 MG/ML
4 INJECTION INTRAMUSCULAR; INTRAVENOUS ONCE AS NEEDED
Status: DISCONTINUED | OUTPATIENT
Start: 2019-04-09 | End: 2019-04-09

## 2019-04-09 RX ORDER — CEFAZOLIN SODIUM/WATER 2 G/20 ML
2 SYRINGE (ML) INTRAVENOUS ONCE
Status: COMPLETED | OUTPATIENT
Start: 2019-04-09 | End: 2019-04-09

## 2019-04-09 RX ORDER — METOCLOPRAMIDE 10 MG/1
10 TABLET ORAL ONCE
Status: DISCONTINUED | OUTPATIENT
Start: 2019-04-09 | End: 2019-04-09 | Stop reason: HOSPADM

## 2019-04-09 RX ORDER — MORPHINE SULFATE 4 MG/ML
4 INJECTION, SOLUTION INTRAMUSCULAR; INTRAVENOUS EVERY 10 MIN PRN
Status: DISCONTINUED | OUTPATIENT
Start: 2019-04-09 | End: 2019-04-09

## 2019-04-09 RX ORDER — SODIUM CHLORIDE, SODIUM LACTATE, POTASSIUM CHLORIDE, CALCIUM CHLORIDE 600; 310; 30; 20 MG/100ML; MG/100ML; MG/100ML; MG/100ML
INJECTION, SOLUTION INTRAVENOUS CONTINUOUS
Status: DISCONTINUED | OUTPATIENT
Start: 2019-04-09 | End: 2019-04-09

## 2019-04-09 RX ORDER — FAMOTIDINE 20 MG/1
20 TABLET ORAL ONCE
Status: DISCONTINUED | OUTPATIENT
Start: 2019-04-09 | End: 2019-04-09 | Stop reason: HOSPADM

## 2019-04-09 RX ORDER — MORPHINE SULFATE 10 MG/ML
6 INJECTION, SOLUTION INTRAMUSCULAR; INTRAVENOUS EVERY 10 MIN PRN
Status: DISCONTINUED | OUTPATIENT
Start: 2019-04-09 | End: 2019-04-09

## 2019-04-09 RX ORDER — HYDROCODONE BITARTRATE AND ACETAMINOPHEN 5; 325 MG/1; MG/1
1 TABLET ORAL AS NEEDED
Status: DISCONTINUED | OUTPATIENT
Start: 2019-04-09 | End: 2019-04-09

## 2019-04-09 RX ORDER — HALOPERIDOL 5 MG/ML
0.25 INJECTION INTRAMUSCULAR ONCE AS NEEDED
Status: DISCONTINUED | OUTPATIENT
Start: 2019-04-09 | End: 2019-04-09

## 2019-04-09 RX ORDER — NEOSTIGMINE METHYLSULFATE 0.5 MG/ML
INJECTION INTRAVENOUS AS NEEDED
Status: DISCONTINUED | OUTPATIENT
Start: 2019-04-09 | End: 2019-04-09 | Stop reason: SURG

## 2019-04-09 RX ORDER — EPHEDRINE SULFATE 50 MG/ML
INJECTION, SOLUTION INTRAVENOUS AS NEEDED
Status: DISCONTINUED | OUTPATIENT
Start: 2019-04-09 | End: 2019-04-09 | Stop reason: SURG

## 2019-04-09 RX ORDER — GLYCOPYRROLATE 0.2 MG/ML
INJECTION INTRAMUSCULAR; INTRAVENOUS AS NEEDED
Status: DISCONTINUED | OUTPATIENT
Start: 2019-04-09 | End: 2019-04-09 | Stop reason: SURG

## 2019-04-09 RX ADMIN — SODIUM CHLORIDE, SODIUM LACTATE, POTASSIUM CHLORIDE, CALCIUM CHLORIDE: 600; 310; 30; 20 INJECTION, SOLUTION INTRAVENOUS at 10:48:00

## 2019-04-09 RX ADMIN — CEFAZOLIN SODIUM/WATER 2 G: 2 G/20 ML SYRINGE (ML) INTRAVENOUS at 11:01:00

## 2019-04-09 RX ADMIN — SODIUM CHLORIDE, SODIUM LACTATE, POTASSIUM CHLORIDE, CALCIUM CHLORIDE: 600; 310; 30; 20 INJECTION, SOLUTION INTRAVENOUS at 11:43:00

## 2019-04-09 RX ADMIN — DEXAMETHASONE SODIUM PHOSPHATE 4 MG: 4 MG/ML VIAL (ML) INJECTION at 10:51:00

## 2019-04-09 RX ADMIN — GLYCOPYRROLATE 0.6 MG: 0.2 INJECTION INTRAMUSCULAR; INTRAVENOUS at 11:24:00

## 2019-04-09 RX ADMIN — ONDANSETRON 4 MG: 2 INJECTION INTRAMUSCULAR; INTRAVENOUS at 10:51:00

## 2019-04-09 RX ADMIN — NEOSTIGMINE METHYLSULFATE 3 MG: 0.5 INJECTION INTRAVENOUS at 11:24:00

## 2019-04-09 RX ADMIN — ROCURONIUM BROMIDE 30 MG: 10 INJECTION, SOLUTION INTRAVENOUS at 10:51:00

## 2019-04-09 RX ADMIN — EPHEDRINE SULFATE 10 MG: 50 INJECTION, SOLUTION INTRAVENOUS at 11:04:00

## 2019-04-09 RX ADMIN — LIDOCAINE HYDROCHLORIDE 40 MG: 10 INJECTION, SOLUTION EPIDURAL; INFILTRATION; INTRACAUDAL; PERINEURAL at 10:51:00

## 2019-04-09 RX ADMIN — PHENYLEPHRINE HCL 100 MCG: 10 MG/ML VIAL (ML) INJECTION at 10:59:00

## 2019-04-09 NOTE — ANESTHESIA PROCEDURE NOTES
Airway  Date/Time: 4/9/2019 11:05 AM  Urgency: elective    Airway not difficult    General Information and Staff    Patient location during procedure: OR  Anesthesiologist: Minal Alanis MD  Resident/CRNA: Liza Randolph CRNA  Performed: Marylen Neigh

## 2019-04-09 NOTE — ANESTHESIA POSTPROCEDURE EVALUATION
Patient: Roman Mcarthur    Procedure Summary     Date:  04/09/19 Room / Location:  [address] [place] [address] MAIN OR 09 / 300 [facility] MAIN OR    Anesthesia Start:  6796 Anesthesia Stop:  1822    Procedure:  LUMBAR MICRODISCECTOMY 1 LEVEL (Left ) Diagnosis:  (lumbar region, other sponylos

## 2019-04-09 NOTE — ANESTHESIA PREPROCEDURE EVALUATION
Anesthesia PreOp Note    HPI:     Christopher Perales is a 68year old female who presents for preoperative consultation requested by: Clarissa Sanchez MD    Date of Surgery: 4/9/2019    Procedure(s):  LUMBAR MICRODISCECTOMY 1 LEVEL  Indication: lumbar region, other Recurrent cold sores     takes acyclovir prn (Dr Rosemarie Vega rx in about 2014)   • Rheumatoid arthritis [facility]) 2010    sees Dr Asia Tomlinson   • Type 2 diabetes mellitus ([facility].) 2005    oral medication.    • Visual impairment     glasses   • Vitamin B12 deficiency 07 SULFASALAZINE 500 MG Oral Tab TAKE 2 TABLETS THREE TIMES DAILY WITH MEALS Disp: 540 tablet Rfl: 1 4/8/2019 at 2000   METOPROLOL TARTRATE 50 MG Oral Tab TAKE 1 TABLET TWICE DAILY Disp: 180 tablet Rfl: 3 2/9/9667 at 4413   Certolizumab Pegol (CIMZIA SC) In (1 brother, 2 sisters.) Other    • Other (sciatica) Brother    • Other (2 sons, 1 daughter) Other    • Asthma Son    • Bleeding Disorders Neg    • Clotting Disorder Neg      Social History    Socioeconomic History      Marital status:       Spouse n Hazards: Not Asked        Sleep Concern: Not Asked        Stress Concern: Not Asked        Weight Concern: Not Asked        Special Diet: Not Asked        Back Care: Not Asked        Exercise: Not Asked        Bike Helmet: Not Asked        Seat Belt: Not A Plan and Risks Discussed With:  Patient  Use of Blood Products Discussed With:  Patient      I have informed Hattie Clemons and/or legal guardian or family member of the nature of the anesthetic plan, benefits, risks including possible dental damage if relev

## 2019-04-09 NOTE — INTERVAL H&P NOTE
Pre-op Diagnosis: lumbar region, other sponylosis with radiculopathy    The above referenced H&P was reviewed by Yury Sanhcez MD on 4/9/2019, the patient was examined and no significant changes have occurred in the patient's condition since the H&P was per

## 2019-04-10 NOTE — OPERATIVE REPORT
NEUROSURGERY OPERATIVE NOTE    Surgery Date: 4/9/2019  Hospital: [facility]  Patient Name: Dimas Hogan  Patient MR#: [id number]  Surgeon: Dr. Kyle Hoover  Co-Surgeon: None  Assistant: None    Anesthesia:  GETA  Length: 30 min  Antibiotics: IV  Specimen: the feet. The head was placed in a neutral position on a foam ring. The lumbar spine was then prepped and draped in the usual sterile manner.     Surgical description: C-arm fluoroscopy was used for needle-localization of the L4-L5 disc space on the appr

## 2019-04-24 ENCOUNTER — NURSE ONLY (OUTPATIENT)
Dept: RHEUMATOLOGY | Facility: CLINIC | Age: 74
End: 2019-04-24
Payer: MEDICARE

## 2019-04-24 ENCOUNTER — HOSPITAL ENCOUNTER (OUTPATIENT)
Dept: GENERAL RADIOLOGY | Facility: HOSPITAL | Age: 74
Discharge: HOME | End: 2019-04-24
Attending: INTERNAL MEDICINE
Payer: MEDICARE

## 2019-04-24 ENCOUNTER — OFFICE VISIT (OUTPATIENT)
Dept: INTERNAL MEDICINE CLINIC | Facility: CLINIC | Age: 74
End: 2019-04-24
Payer: MEDICARE

## 2019-04-24 ENCOUNTER — TELEPHONE (OUTPATIENT)
Dept: INTERNAL MEDICINE CLINIC | Facility: CLINIC | Age: 74
End: 2019-04-24

## 2019-04-24 VITALS
BODY MASS INDEX: 22.25 KG/M2 | SYSTOLIC BLOOD PRESSURE: 140 MMHG | WEIGHT: 106 LBS | DIASTOLIC BLOOD PRESSURE: 64 MMHG | HEART RATE: 76 BPM | HEIGHT: 58 IN | TEMPERATURE: 98 F

## 2019-04-24 DIAGNOSIS — S49.92XA INJURY OF LEFT UPPER ARM, INITIAL ENCOUNTER: ICD-10-CM

## 2019-04-24 DIAGNOSIS — M06.9 RHEUMATOID ARTHRITIS, INVOLVING UNSPECIFIED SITE, UNSPECIFIED RHEUMATOID FACTOR PRESENCE: Primary | ICD-10-CM

## 2019-04-24 DIAGNOSIS — S49.92XA INJURY OF LEFT UPPER ARM, INITIAL ENCOUNTER: Primary | ICD-10-CM

## 2019-04-24 PROCEDURE — 99213 OFFICE O/P EST LOW 20 MIN: CPT | Performed by: INTERNAL MEDICINE

## 2019-04-24 PROCEDURE — 73030 X-RAY EXAM OF SHOULDER: CPT | Performed by: INTERNAL MEDICINE

## 2019-04-24 PROCEDURE — G0463 HOSPITAL OUTPT CLINIC VISIT: HCPCS | Performed by: INTERNAL MEDICINE

## 2019-04-24 PROCEDURE — 96372 THER/PROPH/DIAG INJ SC/IM: CPT | Performed by: INTERNAL MEDICINE

## 2019-04-24 PROCEDURE — 73060 X-RAY EXAM OF HUMERUS: CPT | Performed by: INTERNAL MEDICINE

## 2019-04-24 NOTE — TELEPHONE ENCOUNTER
To nursing, please tell patient x-ray left shoulder and left humerus 4/24/19 shows a fracture of the left humerus close to the shoulder joint. Needs to see orthopedics for this. I gave her the name of Dr. Christiano Montenegro or Dr. Shahrzad Montoya.   Keep arm in a sling un

## 2019-04-24 NOTE — PROGRESS NOTES
Diandra Allison is a 68year old female who presents for     Here with her . Letty Mcqueen today and hurt L upper arm:  Letty Mcqueen at the [facility] at about 8:20 am today. Lost balance walking down the corrider. Fell onto L side.  Had \"excruciating\" pain in L shoulder resection herniated intervertebral disc, L4-L5 for recurrence.  Dr. Ronnie Conner   • [address] [place] [address], [facility] 61    x3   • CABG  2007    Dr Juli Thomas   • CAPSULE ENDOSCOPY N/A 9/11/2017    Performed by Cristian Schumacher MD at [address] [place] [address] ENDOSCOPY   • COLONOSCOPY  08/ kg)      General: appears well nourished and in no acute distress  L upper arm--pain w any attempts to move L shoulder or L upper arm. Holds arm close to her body. Swelling in L upper arm. Small bruise in L upper arm.    No tend or decr ROM at L wrist or 23 was 3/19/18, Prevnar 6/2/17. zostvax -4/2011. shingrix discussed at 6/18/18 visit. Flu shot 9/18/18. colonoscopy and EGD--Dr Radha Cedillo 8/29/17- neg exc small non-bleeding int hemorrhoids. Mammo 6/16/18-ok.   [id number].       6/24/18–CMP TSH FreeStyle System (FREESTYLE) Does not apply Kit  Disp:  Rfl:    Calcium Carb-Cholecalciferol (CALCIUM 500 +D OR) Take  by mouth. Take 1 tab.  Every day Disp:  Rfl:          Mary Sanchez MD  4/24/2019

## 2019-04-24 NOTE — TELEPHONE ENCOUNTER
I discussed x-ray findings with Dr. Alin Cottrell. He said keep appointment with him in 2 days (Friday). He agreed with sling.

## 2019-04-24 NOTE — TELEPHONE ENCOUNTER
Called patent with Dr. Laguna Cons message. Patient expressed understanding. Has appointment with Dr. Shanika Burks on Friday at noon.

## 2019-04-24 NOTE — PROGRESS NOTES
Name and  identified. Cimzia 200mg given subcutaneous into right and left lower abdomen. Patient tolerated well. Patient will return in one month for the next injection.

## 2019-05-16 RX ORDER — METOPROLOL TARTRATE 50 MG/1
TABLET, FILM COATED ORAL
Qty: 180 TABLET | Refills: 3 | Status: SHIPPED | OUTPATIENT
Start: 2019-05-16 | End: 2020-04-15

## 2019-05-16 RX ORDER — SULFASALAZINE 500 MG/1
TABLET ORAL
Qty: 540 TABLET | Refills: 1 | Status: SHIPPED | OUTPATIENT
Start: 2019-05-16 | End: 2020-02-10

## 2019-05-16 RX ORDER — HYDROCHLOROTHIAZIDE 12.5 MG/1
TABLET ORAL
Qty: 90 TABLET | Refills: 3 | Status: SHIPPED | OUTPATIENT
Start: 2019-05-16 | End: 2020-03-30

## 2019-05-16 NOTE — TELEPHONE ENCOUNTER
LOV:1-16  Last Filled:10-18, #540 with 1 refill  Labs:3-29, ALT 16 and AST 16  Future Appointments   Date Time Provider William Vergara   5/20/2019 11:00 AM Kirby Aleman MD 2014 [facility]       Please Advise

## 2019-05-20 ENCOUNTER — OFFICE VISIT (OUTPATIENT)
Dept: RHEUMATOLOGY | Facility: CLINIC | Age: 74
End: 2019-05-20
Payer: MEDICARE

## 2019-05-20 VITALS
BODY MASS INDEX: 22.25 KG/M2 | WEIGHT: 106 LBS | DIASTOLIC BLOOD PRESSURE: 80 MMHG | HEART RATE: 91 BPM | HEIGHT: 58 IN | SYSTOLIC BLOOD PRESSURE: 148 MMHG

## 2019-05-20 DIAGNOSIS — Z51.81 THERAPEUTIC DRUG MONITORING: ICD-10-CM

## 2019-05-20 DIAGNOSIS — M06.9 RHEUMATOID ARTHRITIS, INVOLVING UNSPECIFIED SITE, UNSPECIFIED RHEUMATOID FACTOR PRESENCE: Primary | ICD-10-CM

## 2019-05-20 PROCEDURE — G0463 HOSPITAL OUTPT CLINIC VISIT: HCPCS | Performed by: INTERNAL MEDICINE

## 2019-05-20 PROCEDURE — 99214 OFFICE O/P EST MOD 30 MIN: CPT | Performed by: INTERNAL MEDICINE

## 2019-05-20 RX ORDER — GABAPENTIN 600 MG/1
TABLET ORAL 2 TIMES DAILY
Refills: 1 | COMMUNITY
Start: 2019-04-22 | End: 2019-11-22

## 2019-05-20 NOTE — PROGRESS NOTES
Hattie Clemons is a 68year old female. HPI:   Patient presents with:  Rheumatoid Arthritis      I had the pleasure of seeing Hattie Clemons on 7/23/2018. I had last seen her on  5/18/2018. I hadlas tseen her on 2/7/2018. I had last seen her on 10/6/2017.  I week.     12/12/2016  She is still doing her water aerbics. occl her righ thand she gets numb and stiffness. It takes a whiel somtesim. It's not severe. No flares ups. She's been on leflunomide 10mg a day.  She may need to switch b/c it will be more e doesn't hurt. She gets nauseaous once in awhile. She is feeling like she did better on methotrexate - it wasn't so bad. The leflunomide is just as expensive as methotrexate. 5/18/2018  She's on leflunoimde 20mg a day now.    sh'es being albe to [place] better  The RA is not bad. She feels it's mostly her arm being in a sling that she's not being able to do things.          HISTORY:  Past Medical History:   Diagnosis Date   • Amblyopia 1951    OD   • Anemia 07/2017    mixed iron and B12 deficiency   • Back TAKE 1 TABLET EVERY DAY Disp: 90 tablet Rfl: 1   Certolizumab Pegol (CIMZIA SC) Inject 2 Doses into the skin every 30 (thirty) days.    Disp:  Rfl:    cyanocobalamin 1000 MCG/ML Injection Solution Inject 1 mL (1,000 mcg total) into the muscle every 30 (thir 26.0 - 34.0 pg    30.8   MCHC      31.0 - 37.0 g/dL    30.4 (L)   RDW-SD      35.1 - 46.3 fL    50.2 (H)   RDW      11.0 - 15.0 %    13.4   Platelet Count      663.3 - 450.0 10(3)uL    311.0   Prelim Neutrophil Abs      1.50 - 7.70 x10 (3) uL    4.53   Ne Quantiferon TB Gold NIL      IU/mL  0.033   Quantiferon TB Gold TB-NIL      IU/mL  0.006   Quantiferon TB Gold IRENE-NIL      IU/mL  7.986   QTB. RESULT      Negative  Negative   HEPATITIS B CORE ANTIBODY(S)      Nonreactive   Nonreactive   HCV AB      Nonr left leg venous grafting-stable   5. Diabetes type 2-controlled, continue meds  6. Anemia - EDWIN - egd/cscope ok - f/u hematology - but could also be from chronic disease -   7.  Osteopenia - 2008 - with hx of RA - makes her high risk for osteoporosiss - gi

## 2019-05-20 NOTE — PATIENT INSTRUCTIONS
1. Cont. Sulfasalazine -  1000mg three tiems day    2. Cont.  lelfunomide  20mg a day -    3. Cont.  cimzi 400mg a month.today   4. Return to clinic in 3-4 months. 5. Labs every 3 months - July -   6. check DEXA   7 .  Check tb test with next labs

## 2019-05-23 ENCOUNTER — TELEPHONE (OUTPATIENT)
Dept: RHEUMATOLOGY | Facility: CLINIC | Age: 74
End: 2019-05-23

## 2019-05-23 NOTE — TELEPHONE ENCOUNTER
Last Cimzia injection received on 4/24. Pt scheduled for Cimzia injection 5/24 at [facility].      Future Appointments   Date Time Provider William Vergara   5/24/2019 11:00 AM [facility] RN RHEUMATOLOGY 2014 [facility]   6/26/2019 11:00 AM [facility] RN RHEUMATOLOGY
Pt is requesting CIMZIA injection     Please advise
none

## 2019-05-24 ENCOUNTER — NURSE ONLY (OUTPATIENT)
Dept: RHEUMATOLOGY | Facility: CLINIC | Age: 74
End: 2019-05-24
Payer: MEDICARE

## 2019-05-24 DIAGNOSIS — M06.9 RHEUMATOID ARTHRITIS, INVOLVING UNSPECIFIED SITE, UNSPECIFIED RHEUMATOID FACTOR PRESENCE: Primary | ICD-10-CM

## 2019-05-24 PROCEDURE — 96372 THER/PROPH/DIAG INJ SC/IM: CPT | Performed by: INTERNAL MEDICINE

## 2019-05-24 NOTE — PROGRESS NOTES
Name and  verified. Pt aware she was to receive injection of Cimza. Injections given and pt tolerated well. Pt has already schedule follow up appointment in 1 month for next injection. Summary:  1. Cont. Sulfasalazine -  1000mg three tiems day    2.

## 2019-06-10 ENCOUNTER — NURSE ONLY (OUTPATIENT)
Dept: INTERNAL MEDICINE CLINIC | Facility: CLINIC | Age: 74
End: 2019-06-10
Payer: MEDICARE

## 2019-06-10 DIAGNOSIS — E53.8 B12 DEFICIENCY: Primary | ICD-10-CM

## 2019-06-10 PROCEDURE — 96372 THER/PROPH/DIAG INJ SC/IM: CPT | Performed by: INTERNAL MEDICINE

## 2019-06-10 RX ADMIN — CYANOCOBALAMIN 1000 MCG: 1000 INJECTION INTRAMUSCULAR; SUBCUTANEOUS at 11:06:00

## 2019-06-17 ENCOUNTER — PATIENT MESSAGE (OUTPATIENT)
Dept: INTERNAL MEDICINE CLINIC | Facility: CLINIC | Age: 74
End: 2019-06-17

## 2019-06-17 NOTE — TELEPHONE ENCOUNTER
From: Swati Florence  To: Damaris Pierre MD  Sent: 6/17/2019 1:42 PM CDT  Subject: Prescription Question    I've been getting physical therapy for my left arm (which I broke 4/24/19) and my left foot which has nerve damage after my 2 sciatica operations.

## 2019-06-18 ENCOUNTER — PATIENT MESSAGE (OUTPATIENT)
Dept: RHEUMATOLOGY | Facility: CLINIC | Age: 74
End: 2019-06-18

## 2019-06-18 NOTE — TELEPHONE ENCOUNTER
From: Josias Cervantes  To: Larissa Collazo MD  Sent: 6/18/2019 12:12 PM CDT  Subject: Prescription Question    I am currently getting physical therapy for my broken arm (4/24/19) and my left foot after my 2 sciatica surgeries (2/27/19 & 4/9/19).  I have a

## 2019-07-01 ENCOUNTER — TELEPHONE (OUTPATIENT)
Dept: RHEUMATOLOGY | Facility: CLINIC | Age: 74
End: 2019-07-01

## 2019-07-01 ENCOUNTER — TELEPHONE (OUTPATIENT)
Dept: INTERNAL MEDICINE CLINIC | Facility: CLINIC | Age: 74
End: 2019-07-01

## 2019-07-01 NOTE — TELEPHONE ENCOUNTER
Pt scheduled for Cimzia injection on 7/8 at [facility]. Pt informed she will need to reschedule subsequent injections accordingly- stts she will reschedule with  on 7/8 after injection.      LOV:   Future Appointments   Date Time Provider Department Cent

## 2019-07-01 NOTE — TELEPHONE ENCOUNTER
Pt is asking if she can come this week for Cimzia injection. Please advise.  Thank you    Please reply to pool: MONTRELL Hebert

## 2019-07-01 NOTE — TELEPHONE ENCOUNTER
LMTCB would pt like to schedule an appointment for Friday at [facility] OF THE [facility] or Tuesday at [facility] for Cimzia injection. Okay to schedule pt for [facility] nurse visit for Cimzia injection when she calls back.

## 2019-07-08 ENCOUNTER — NURSE ONLY (OUTPATIENT)
Dept: RHEUMATOLOGY | Facility: CLINIC | Age: 74
End: 2019-07-08
Payer: MEDICARE

## 2019-07-08 DIAGNOSIS — M06.9 RHEUMATOID ARTHRITIS, INVOLVING UNSPECIFIED SITE, UNSPECIFIED RHEUMATOID FACTOR PRESENCE: Primary | ICD-10-CM

## 2019-07-08 DIAGNOSIS — Z51.81 ENCOUNTER FOR THERAPEUTIC DRUG MONITORING: ICD-10-CM

## 2019-07-08 PROCEDURE — 96372 THER/PROPH/DIAG INJ SC/IM: CPT | Performed by: INTERNAL MEDICINE

## 2019-07-08 NOTE — PROGRESS NOTES
Name and  verified. Pt aware she was to receive injection of Cimza. Injections given and pt tolerated well. Possible local side effects discussed with pt.  Pt aware to follow up in 4 weeks for next injection and to follow up with provider in August after

## 2019-07-09 RX ORDER — LEFLUNOMIDE 20 MG/1
TABLET ORAL
Qty: 90 TABLET | Refills: 1 | Status: SHIPPED | OUTPATIENT
Start: 2019-07-09 | End: 2019-11-22

## 2019-07-09 NOTE — TELEPHONE ENCOUNTER
Last filled: 12/21/18 #90 tab with 1 refill  LOV:  5/20/19   Future Appointments   Date Time Provider William Vergara   8/12/2019 11:00 AM [facility] RN RHEUMATOLOGY 2014 [facility]   8/26/2019 11:00 AM Carrol Tolbert MD 2014 [facility]   9/27/2019

## 2019-07-17 ENCOUNTER — TELEPHONE (OUTPATIENT)
Dept: INTERNAL MEDICINE CLINIC | Facility: CLINIC | Age: 74
End: 2019-07-17

## 2019-07-17 ENCOUNTER — NURSE ONLY (OUTPATIENT)
Dept: INTERNAL MEDICINE CLINIC | Facility: CLINIC | Age: 74
End: 2019-07-17
Payer: MEDICARE

## 2019-07-17 DIAGNOSIS — E53.8 VITAMIN B12 DEFICIENCY: ICD-10-CM

## 2019-07-17 DIAGNOSIS — Z12.31 VISIT FOR SCREENING MAMMOGRAM: Primary | ICD-10-CM

## 2019-07-17 PROCEDURE — 96372 THER/PROPH/DIAG INJ SC/IM: CPT | Performed by: INTERNAL MEDICINE

## 2019-07-17 RX ADMIN — CYANOCOBALAMIN 1000 MCG: 1000 INJECTION INTRAMUSCULAR; SUBCUTANEOUS at 11:49:00

## 2019-07-17 NOTE — PROGRESS NOTES
Patient presents for monthly b12 injection. Patient name and  verified. Order active in [place]. Patient tolerated injection well.

## 2019-07-17 NOTE — TELEPHONE ENCOUNTER
Spoke with patient today at her nurse visit for vitamin b12 injection. Patient requested order for her yearly mammogram. Last mammogram June 2018. Mammogram order pending for MD review. To Dr. Yuly Coughlin, please advise!

## 2019-07-17 NOTE — TELEPHONE ENCOUNTER
To nursing, please tell patient mammogram order has been entered. Thanks. 1. Visit for screening mammogram  - [facility] SCREENING BILAT (CPT=77067);  Future

## 2019-07-18 NOTE — TELEPHONE ENCOUNTER
Spoke to patient and notified her that mammogram order was placed by Dr. Dary Zavala, patient verbalizes understanding. She reports she already has the phone number for central scheduling and will call to make an appointment.

## 2019-07-29 ENCOUNTER — HOSPITAL ENCOUNTER (OUTPATIENT)
Dept: MAMMOGRAPHY | Facility: HOSPITAL | Age: 74
Discharge: HOME | End: 2019-07-29
Attending: INTERNAL MEDICINE
Payer: MEDICARE

## 2019-07-29 ENCOUNTER — APPOINTMENT (OUTPATIENT)
Dept: LAB | Age: 74
End: 2019-07-29
Attending: INTERNAL MEDICINE
Payer: MEDICARE

## 2019-07-29 ENCOUNTER — LAB ENCOUNTER (OUTPATIENT)
Dept: LAB | Age: 74
End: 2019-07-29
Attending: INTERNAL MEDICINE
Payer: MEDICARE

## 2019-07-29 DIAGNOSIS — Z51.81 THERAPEUTIC DRUG MONITORING: ICD-10-CM

## 2019-07-29 DIAGNOSIS — Z51.81 ENCOUNTER FOR THERAPEUTIC DRUG MONITORING: ICD-10-CM

## 2019-07-29 DIAGNOSIS — Z12.31 VISIT FOR SCREENING MAMMOGRAM: ICD-10-CM

## 2019-07-29 DIAGNOSIS — M06.9 RHEUMATOID ARTHRITIS, INVOLVING UNSPECIFIED SITE, UNSPECIFIED RHEUMATOID FACTOR PRESENCE: ICD-10-CM

## 2019-07-29 LAB
ALBUMIN SERPL-MCNC: 3.6 G/DL (ref 3.4–5)
ALT SERPL-CCNC: 17 U/L (ref 13–56)
AST SERPL-CCNC: 15 U/L (ref 15–37)
BASOPHILS # BLD AUTO: 0.09 X10(3) UL (ref 0–0.2)
BASOPHILS NFR BLD AUTO: 1.2 %
CREAT BLD-MCNC: 0.92 MG/DL (ref 0.55–1.02)
CRP SERPL-MCNC: <0.29 MG/DL (ref ?–0.3)
DEPRECATED RDW RBC AUTO: 46.4 FL (ref 35.1–46.3)
EOSINOPHIL # BLD AUTO: 0.26 X10(3) UL (ref 0–0.7)
EOSINOPHIL NFR BLD AUTO: 3.4 %
ERYTHROCYTE [DISTWIDTH] IN BLOOD BY AUTOMATED COUNT: 13.1 % (ref 11–15)
ERYTHROCYTE [SEDIMENTATION RATE] IN BLOOD: 20 MM/HR (ref 0–30)
HCT VFR BLD AUTO: 36.9 % (ref 35–48)
HGB BLD-MCNC: 11.6 G/DL (ref 12–16)
IMM GRANULOCYTES # BLD AUTO: 0.02 X10(3) UL (ref 0–1)
IMM GRANULOCYTES NFR BLD: 0.3 %
LYMPHOCYTES # BLD AUTO: 2.4 X10(3) UL (ref 1–4)
LYMPHOCYTES NFR BLD AUTO: 31.2 %
MCH RBC QN AUTO: 30.3 PG (ref 26–34)
MCHC RBC AUTO-ENTMCNC: 31.4 G/DL (ref 31–37)
MCV RBC AUTO: 96.3 FL (ref 80–100)
MONOCYTES # BLD AUTO: 0.97 X10(3) UL (ref 0.1–1)
MONOCYTES NFR BLD AUTO: 12.6 %
NEUTROPHILS # BLD AUTO: 3.95 X10 (3) UL (ref 1.5–7.7)
NEUTROPHILS # BLD AUTO: 3.95 X10(3) UL (ref 1.5–7.7)
NEUTROPHILS NFR BLD AUTO: 51.3 %
PLATELET # BLD AUTO: 305 10(3)UL (ref 150–450)
RBC # BLD AUTO: 3.83 X10(6)UL (ref 3.8–5.3)
WBC # BLD AUTO: 7.7 X10(3) UL (ref 4–11)

## 2019-07-29 PROCEDURE — 82565 ASSAY OF CREATININE: CPT

## 2019-07-29 PROCEDURE — 82040 ASSAY OF SERUM ALBUMIN: CPT

## 2019-07-29 PROCEDURE — 84460 ALANINE AMINO (ALT) (SGPT): CPT

## 2019-07-29 PROCEDURE — 77067 SCR MAMMO BI INCL CAD: CPT | Performed by: INTERNAL MEDICINE

## 2019-07-29 PROCEDURE — 86480 TB TEST CELL IMMUN MEASURE: CPT

## 2019-07-29 PROCEDURE — 85025 COMPLETE CBC W/AUTO DIFF WBC: CPT

## 2019-07-29 PROCEDURE — 77063 BREAST TOMOSYNTHESIS BI: CPT | Performed by: INTERNAL MEDICINE

## 2019-07-29 PROCEDURE — 84450 TRANSFERASE (AST) (SGOT): CPT

## 2019-07-29 PROCEDURE — 36415 COLL VENOUS BLD VENIPUNCTURE: CPT

## 2019-07-29 PROCEDURE — 86140 C-REACTIVE PROTEIN: CPT

## 2019-07-29 PROCEDURE — 85652 RBC SED RATE AUTOMATED: CPT

## 2019-07-31 LAB
M TB IFN-G CD4+ T-CELLS BLD-ACNC: 0.03 IU/ML
M TB TUBERC IFN-G BLD QL: NEGATIVE
M TB TUBERC IGNF/MITOGEN IGNF CONTROL: 8.33 IU/ML
QUANTIFERON TB1 MINUS NIL: 0 IU/ML
QUANTIFERON TB2 MINUS NIL: -0.01 IU/ML

## 2019-08-12 ENCOUNTER — NURSE ONLY (OUTPATIENT)
Dept: RHEUMATOLOGY | Facility: CLINIC | Age: 74
End: 2019-08-12
Payer: MEDICARE

## 2019-08-12 ENCOUNTER — OFFICE VISIT (OUTPATIENT)
Dept: INTERNAL MEDICINE CLINIC | Facility: CLINIC | Age: 74
End: 2019-08-12
Payer: MEDICARE

## 2019-08-12 VITALS
BODY MASS INDEX: 22.25 KG/M2 | TEMPERATURE: 98 F | WEIGHT: 106 LBS | HEART RATE: 76 BPM | DIASTOLIC BLOOD PRESSURE: 70 MMHG | SYSTOLIC BLOOD PRESSURE: 110 MMHG | HEIGHT: 58 IN

## 2019-08-12 DIAGNOSIS — M06.00 RHEUMATOID ARTHRITIS WITH NEGATIVE RHEUMATOID FACTOR, INVOLVING UNSPECIFIED SITE (HCC): ICD-10-CM

## 2019-08-12 DIAGNOSIS — E78.2 HYPERLIPIDEMIA, MIXED: ICD-10-CM

## 2019-08-12 DIAGNOSIS — M06.9 RHEUMATOID ARTHRITIS, INVOLVING UNSPECIFIED SITE, UNSPECIFIED RHEUMATOID FACTOR PRESENCE: Primary | ICD-10-CM

## 2019-08-12 DIAGNOSIS — Z95.1 S/P CABG X 3: ICD-10-CM

## 2019-08-12 DIAGNOSIS — Z00.00 MEDICARE ANNUAL WELLNESS VISIT, SUBSEQUENT: Primary | ICD-10-CM

## 2019-08-12 DIAGNOSIS — D64.9 CHRONIC ANEMIA: ICD-10-CM

## 2019-08-12 DIAGNOSIS — I10 ESSENTIAL HYPERTENSION: ICD-10-CM

## 2019-08-12 DIAGNOSIS — E11.9 DIABETES MELLITUS TYPE 2 WITHOUT RETINOPATHY (HCC): ICD-10-CM

## 2019-08-12 DIAGNOSIS — E53.8 VITAMIN B 12 DEFICIENCY: ICD-10-CM

## 2019-08-12 PROCEDURE — G0463 HOSPITAL OUTPT CLINIC VISIT: HCPCS | Performed by: INTERNAL MEDICINE

## 2019-08-12 PROCEDURE — 96372 THER/PROPH/DIAG INJ SC/IM: CPT | Performed by: INTERNAL MEDICINE

## 2019-08-12 PROCEDURE — 99214 OFFICE O/P EST MOD 30 MIN: CPT | Performed by: INTERNAL MEDICINE

## 2019-08-12 PROCEDURE — G0439 PPPS, SUBSEQ VISIT: HCPCS | Performed by: INTERNAL MEDICINE

## 2019-08-12 NOTE — PROGRESS NOTES
Name and  verified. Pt aware she was to receive injection of Cimza. Injections given and pt tolerated well. Possible local side effects discussed with pt. Pt aware to follow up in 4 weeks for next injection.  Pt has follow up appointment scheduled with anny

## 2019-08-12 NOTE — PROGRESS NOTES
Angie Helm is a 68year old female who presents for     Medicare annual    Feels good. Dr Fanny Barkley treated L shoulder with sling. Recovered well. S/p Lumbar disc surgery -Still L foot drop. Has AFO brace. Walks 2 mi/d.     Diabetes: home sugars 11 resection herniated intervertebral disc, L4-L5 for recurrence.  Dr. Doreen Najera   • [address] [place] [address], [facility] 61    x3   • CABG  2007    Dr Medina Poag   • CAPSULE ENDOSCOPY N/A 9/11/2017    Performed by Alejandro Bartlett MD at [facility] ENDOSCOPY   • COLONOSCOPY  08/ EXAM:   /70   Pulse 76   Temp 97.8 °F (36.6 °C) (Oral)   Ht 4' 10\" (1.473 m)   Wt 106 lb (48.1 kg)   BMI 22.15 kg/m²       Wt Readings from Last 6 Encounters:  08/12/19 : 106 lb (48.1 kg)  05/20/19 : 106 lb (48.1 kg)  05/06/19 : 103 lb (46.7 k level -globulin 4.4 on 7/28/17. HECTOR/SPE-no monoclonal proteins.     Healthcare maintenance:  Vaccines-pnvx 3/19/18, Prevnar 6/2/17. zostvax -4/2011. shingrix discussed at 6/18/18 visit. Flu shot 9/18/18. flu shot in fall. Tetanus shot declined.    Colonoscop Tab Take 1 tablet (400 mg total) by mouth 3 (three) times daily. As needed for cold sores Disp: 21 tablet Rfl: 3   aspirin 325 MG Oral Tab Take 325 mg by mouth daily.  Disp:  Rfl:    FreeStyle System (FREESTYLE) Does not apply Kit  Disp:  Rfl:    Calcium Ca No    Memory Problems?: No      Fall/Risk Assessment     Do you have 3 or more medical conditions?: 0-No    Have you fallen in the last 12 months?: 1-Yes                                        Fall/Risk Scorin          Depression Screening (PHQ-2/PHQ-9

## 2019-08-14 ENCOUNTER — LAB ENCOUNTER (OUTPATIENT)
Dept: LAB | Age: 74
End: 2019-08-14
Attending: INTERNAL MEDICINE
Payer: MEDICARE

## 2019-08-14 ENCOUNTER — TELEPHONE (OUTPATIENT)
Dept: INTERNAL MEDICINE CLINIC | Facility: CLINIC | Age: 74
End: 2019-08-14

## 2019-08-14 DIAGNOSIS — E11.9 DIABETES MELLITUS TYPE 2 WITHOUT RETINOPATHY (HCC): ICD-10-CM

## 2019-08-14 DIAGNOSIS — R80.9 MICROALBUMINURIA: Primary | ICD-10-CM

## 2019-08-14 LAB
ALBUMIN SERPL-MCNC: 3.3 G/DL (ref 3.4–5)
ALBUMIN/GLOB SERPL: 0.8 {RATIO} (ref 1–2)
ALP LIVER SERPL-CCNC: 61 U/L (ref 55–142)
ALT SERPL-CCNC: 15 U/L (ref 13–56)
ANION GAP SERPL CALC-SCNC: 6 MMOL/L (ref 0–18)
AST SERPL-CCNC: 14 U/L (ref 15–37)
BACTERIA UR QL AUTO: NEGATIVE /HPF
BILIRUB SERPL-MCNC: 0.3 MG/DL (ref 0.1–2)
BILIRUB UR QL: NEGATIVE
BUN BLD-MCNC: 16 MG/DL (ref 7–18)
BUN/CREAT SERPL: 19.5 (ref 10–20)
CALCIUM BLD-MCNC: 9 MG/DL (ref 8.5–10.1)
CHLORIDE SERPL-SCNC: 106 MMOL/L (ref 98–112)
CHOLEST SMN-MCNC: 150 MG/DL (ref ?–200)
CLARITY UR: CLEAR
CO2 SERPL-SCNC: 30 MMOL/L (ref 21–32)
COLOR UR: YELLOW
CREAT BLD-MCNC: 0.82 MG/DL (ref 0.55–1.02)
CREAT UR-SCNC: 205 MG/DL
EST. AVERAGE GLUCOSE BLD GHB EST-MCNC: 126 MG/DL (ref 68–126)
GLOBULIN PLAS-MCNC: 4 G/DL (ref 2.8–4.4)
GLUCOSE BLD-MCNC: 126 MG/DL (ref 70–99)
GLUCOSE UR-MCNC: NEGATIVE MG/DL
HBA1C MFR BLD HPLC: 6 % (ref ?–5.7)
HDLC SERPL-MCNC: 66 MG/DL (ref 40–59)
HGB UR QL STRIP.AUTO: NEGATIVE
KETONES UR-MCNC: NEGATIVE MG/DL
LDLC SERPL CALC-MCNC: 49 MG/DL (ref ?–100)
LEUKOCYTE ESTERASE UR QL STRIP.AUTO: NEGATIVE
M PROTEIN MFR SERPL ELPH: 7.3 G/DL (ref 6.4–8.2)
MICROALBUMIN UR-MCNC: 8.83 MG/DL
MICROALBUMIN/CREAT 24H UR-RTO: 43.1 UG/MG (ref ?–30)
NITRITE UR QL STRIP.AUTO: NEGATIVE
NONHDLC SERPL-MCNC: 84 MG/DL (ref ?–130)
OSMOLALITY SERPL CALC.SUM OF ELEC: 297 MOSM/KG (ref 275–295)
PATIENT FASTING: YES
PATIENT FASTING: YES
PH UR: 5 [PH] (ref 5–8)
POTASSIUM SERPL-SCNC: 4.3 MMOL/L (ref 3.5–5.1)
PROT UR-MCNC: NEGATIVE MG/DL
RBC #/AREA URNS AUTO: 1 /HPF
SODIUM SERPL-SCNC: 142 MMOL/L (ref 136–145)
SP GR UR STRIP: 1.03 (ref 1–1.03)
T4 FREE SERPL-MCNC: 0.9 NG/DL (ref 0.8–1.7)
TRIGL SERPL-MCNC: 176 MG/DL (ref 30–149)
TSI SER-ACNC: 4.82 MIU/ML (ref 0.36–3.74)
UROBILINOGEN UR STRIP-ACNC: <2
VIT C UR-MCNC: NEGATIVE MG/DL
VLDLC SERPL CALC-MCNC: 35 MG/DL (ref 0–30)
WBC #/AREA URNS AUTO: 2 /HPF

## 2019-08-14 PROCEDURE — 84439 ASSAY OF FREE THYROXINE: CPT

## 2019-08-14 PROCEDURE — 80053 COMPREHEN METABOLIC PANEL: CPT

## 2019-08-14 PROCEDURE — 80061 LIPID PANEL: CPT

## 2019-08-14 PROCEDURE — 82043 UR ALBUMIN QUANTITATIVE: CPT

## 2019-08-14 PROCEDURE — 82570 ASSAY OF URINE CREATININE: CPT

## 2019-08-14 PROCEDURE — 84443 ASSAY THYROID STIM HORMONE: CPT

## 2019-08-14 PROCEDURE — 81003 URINALYSIS AUTO W/O SCOPE: CPT | Performed by: INTERNAL MEDICINE

## 2019-08-14 PROCEDURE — 36415 COLL VENOUS BLD VENIPUNCTURE: CPT

## 2019-08-14 PROCEDURE — 83036 HEMOGLOBIN GLYCOSYLATED A1C: CPT

## 2019-08-14 RX ORDER — LISINOPRIL 2.5 MG/1
2.5 TABLET ORAL DAILY
Qty: 90 TABLET | Refills: 3 | Status: SHIPPED | OUTPATIENT
Start: 2019-08-14 | End: 2020-01-10

## 2019-08-14 NOTE — TELEPHONE ENCOUNTER
To nursing, please tell patient blood and urine test on 8/14/19–results are okay except mildly increased microalbumin/creatinine ratio in the urine. Result is 43.1 and would like below 30.   Recommend she start a medication for this lisinopril 2.5 mg daily

## 2019-08-14 NOTE — TELEPHONE ENCOUNTER
Spoke to patient and relayed MD message and instructions below, patient verbalized understanding. Patient agrees to start taking lisinopril daily, she requests medication be sent to the [facility] instead of [facility] so she can start it faster.  Erx'd m

## 2019-08-16 ENCOUNTER — NURSE ONLY (OUTPATIENT)
Dept: INTERNAL MEDICINE CLINIC | Facility: CLINIC | Age: 74
End: 2019-08-16
Payer: MEDICARE

## 2019-08-16 DIAGNOSIS — E53.8 B12 DEFICIENCY: ICD-10-CM

## 2019-08-16 PROCEDURE — 96372 THER/PROPH/DIAG INJ SC/IM: CPT | Performed by: INTERNAL MEDICINE

## 2019-08-16 RX ADMIN — CYANOCOBALAMIN 1000 MCG: 1000 INJECTION INTRAMUSCULAR; SUBCUTANEOUS at 10:38:00

## 2019-08-18 ENCOUNTER — PATIENT MESSAGE (OUTPATIENT)
Dept: INTERNAL MEDICINE CLINIC | Facility: CLINIC | Age: 74
End: 2019-08-18

## 2019-08-19 ENCOUNTER — PATIENT MESSAGE (OUTPATIENT)
Dept: INTERNAL MEDICINE CLINIC | Facility: CLINIC | Age: 74
End: 2019-08-19

## 2019-08-19 NOTE — TELEPHONE ENCOUNTER
From: Samia Gomez  To: Salinas Fofana MD  Sent: 8/18/2019 4:05 PM CDT  Subject: Prescription Question    I've been taking Lisinopril since 8/14/19 and it is severely affecting my balance.  My  and I went for our regular walk today and I think I l

## 2019-08-19 NOTE — TELEPHONE ENCOUNTER
Called patient. Spoke to  skye, ok per hipaa. I read Dr Barbosa Pencil message to him. He will also tell his wife to read the message on [employer].   He does not think an appt is needed today because they went for a long walk today and she did not h

## 2019-08-19 NOTE — TELEPHONE ENCOUNTER
From: Matthew Willis  To: Francisco Veloz MD  Sent: 8/19/2019 2:16 PM CDT  Subject: Test Results Question    My  and I had our 2-mile walk this morning with no issues. We went out and bought a blood pressure machine.  My first reading was 111/67 with

## 2019-08-26 ENCOUNTER — OFFICE VISIT (OUTPATIENT)
Dept: RHEUMATOLOGY | Facility: CLINIC | Age: 74
End: 2019-08-26
Payer: MEDICARE

## 2019-08-26 ENCOUNTER — HOSPITAL ENCOUNTER (OUTPATIENT)
Dept: BONE DENSITY | Facility: HOSPITAL | Age: 74
Discharge: HOME | End: 2019-08-26
Attending: INTERNAL MEDICINE | Admitting: INTERNAL MEDICINE
Payer: MEDICARE

## 2019-08-26 VITALS
HEART RATE: 69 BPM | HEIGHT: 58 IN | BODY MASS INDEX: 22.25 KG/M2 | SYSTOLIC BLOOD PRESSURE: 108 MMHG | DIASTOLIC BLOOD PRESSURE: 69 MMHG | WEIGHT: 106 LBS

## 2019-08-26 DIAGNOSIS — Z51.81 ENCOUNTER FOR THERAPEUTIC DRUG MONITORING: ICD-10-CM

## 2019-08-26 DIAGNOSIS — M81.0 AGE-RELATED OSTEOPOROSIS WITHOUT CURRENT PATHOLOGICAL FRACTURE: ICD-10-CM

## 2019-08-26 DIAGNOSIS — M06.9 RHEUMATOID ARTHRITIS, INVOLVING UNSPECIFIED SITE, UNSPECIFIED RHEUMATOID FACTOR PRESENCE: Primary | ICD-10-CM

## 2019-08-26 PROCEDURE — 77080 DXA BONE DENSITY AXIAL: CPT | Performed by: INTERNAL MEDICINE

## 2019-08-26 PROCEDURE — 99214 OFFICE O/P EST MOD 30 MIN: CPT | Performed by: INTERNAL MEDICINE

## 2019-08-26 PROCEDURE — G0463 HOSPITAL OUTPT CLINIC VISIT: HCPCS | Performed by: INTERNAL MEDICINE

## 2019-08-26 NOTE — PATIENT INSTRUCTIONS
1. Cont. Sulfasalazine -  1000mg three tiems day    2. Cont.  lelfunomide  20mg a day -    3. Cont.  cimzi 400mg a month.today   4. Return to clinic in 4 months. - in 1/2020   5. Labs every 4 months -   6.  check DEXA

## 2019-08-26 NOTE — PROGRESS NOTES
Shannan Santiago is a 69year old female. HPI:   Patient presents with: Follow - Up: Labs  Hip Pain: L hand      I had the pleasure of seeing Shannan Santiago on 8/26/2019.  .  As you recall she is extremely pleasant 69-year-old who's a history of seronegative it was so nauseated with it. She took 1000mg bid x 1 week but felt bad and felt terrible. She is taking tylenol arthritis 650mg , generic - two in am and two at night. She then takes two tablets three times  adya b/c she is hurting more.  This is gettin sure. The arthriitis is not bad today. sometiems the pain is very bad. Her right elbow give hs rpain her wrists are really bad. She has a hard time moving her hands. She can't lift glass easily with one hand. She needs two hands.      7/23/2018  She's doi watching her walking and making sure she doesn't trip b/c of her left foot drop. She finished PT for 4 months on left arm and foot. She has 1/10 pain. She is getting DEXA today.        HISTORY:  Past Medical History:   Diagnosis Date   • Amblyopia 195 Rfl: 3   gabapentin 100 MG Oral Cap Take 600 mg by mouth 3 (three) times daily. Disp: 1 capsule Rfl: 0   Acetaminophen  MG Oral Tab CR Take 2 tablets (1,300 mg total) by mouth 3 (three) times daily.  Disp:  Rfl: 1   MetFORMIN HCl 1000 MG Oral Tab Ta 8/14/2019 8/14/2019           7:36 AM  7:36 AM   WBC      4.0 - 11.0 x10(3) uL     RBC      3.80 - 5.30 x10(6)uL     Hemoglobin      12.0 - 16.0 g/dL     Hematocrit      35.0 - 48.0 %     MCV      80.0 - 100.0 fL     MCH      26.0 - 34.0 pg     MCHC      3 Negative   Glucose Urine      Negative mg/dL  Negative   Ketones, UA      Negative mg/dL  Negative   Bilirubin      Negative  Negative   Blood Urine      Negative  Negative   Nitrite Urine      Negative  Negative   Urobilinogen Urine      <2.0  <2.0   Leuk Monocytes Absolute      0.10 - 1.00 x10(3) uL 0.97   Eosinophils Absolute      0.00 - 0.70 x10(3) uL 0.26   Basophils Absolute      0.00 - 0.20 x10(3) uL 0.09   Immature Granulocyte Absolute      0.00 - 1.00 x10(3) uL 0.02   Neutrophils %      % 51.3   L mg/dL    Quantiferon TB NIL      IU/mL 0.03   Quantiferon-TB1 Minus NIL      IU/mL 0.00   Quantiferon-TB2 Minus NIL      IU/mL -0.01   Quantiferon TB Mitogen minus NIL      IU/mL 8.33   QTB. RESULT      Negative Negative   MALB URINE      mg/dL    CREATININ a day on 3/2016 - from methotrexate 15mg a week   - stopped leflunomide in beginning of 3/2017 - b/c of expense but went back on b/c of her pain being bad   3/2018 - quantiferon gold and hep b and c studies. ,   - return to clinic in 3 months. - cont.  h 3. Cont.  cimzi 400mg a month.today   4. Return to clinic in 4 months. - in 1/2020   5. Labs every 4 months -   6.  check DEXA         Pratik Benavidez MD  5/20/2019   11:17 AM  - Reviewed [place]- information  through Epic

## 2019-09-09 ENCOUNTER — TELEPHONE (OUTPATIENT)
Dept: RHEUMATOLOGY | Facility: CLINIC | Age: 74
End: 2019-09-09

## 2019-09-11 ENCOUNTER — NURSE ONLY (OUTPATIENT)
Dept: RHEUMATOLOGY | Facility: CLINIC | Age: 74
End: 2019-09-11
Payer: MEDICARE

## 2019-09-11 DIAGNOSIS — M06.9 RHEUMATOID ARTHRITIS, INVOLVING UNSPECIFIED SITE, UNSPECIFIED RHEUMATOID FACTOR PRESENCE: Primary | ICD-10-CM

## 2019-09-11 PROCEDURE — 96372 THER/PROPH/DIAG INJ SC/IM: CPT | Performed by: INTERNAL MEDICINE

## 2019-09-11 NOTE — PROGRESS NOTES
Name and  verified. Pt aware she was to receive injection of Cimza. Injections given to left and right lower abdomen and pt tolerated well. Possible local side effects discussed with pt. Pt aware to follow up in 4 weeks for next injection.      Future Ap

## 2019-09-16 ENCOUNTER — NURSE ONLY (OUTPATIENT)
Dept: INTERNAL MEDICINE CLINIC | Facility: CLINIC | Age: 74
End: 2019-09-16
Payer: MEDICARE

## 2019-09-16 DIAGNOSIS — E53.8 VITAMIN B12 DEFICIENCY: Primary | ICD-10-CM

## 2019-09-16 PROCEDURE — 96372 THER/PROPH/DIAG INJ SC/IM: CPT | Performed by: INTERNAL MEDICINE

## 2019-09-16 RX ADMIN — CYANOCOBALAMIN 1000 MCG: 1000 INJECTION INTRAMUSCULAR; SUBCUTANEOUS at 11:17:00

## 2019-09-19 ENCOUNTER — PATIENT MESSAGE (OUTPATIENT)
Dept: INTERNAL MEDICINE CLINIC | Facility: CLINIC | Age: 74
End: 2019-09-19

## 2019-09-19 NOTE — TELEPHONE ENCOUNTER
Per LOV:DM type 2- metformin 500 mg bid (cut dose after A1c 4.8 on 1/9/19). A1c 5.2 on 4/1/19. Glimepiride d/c 12/2017.  Dr. Dev Ramachandran 11/2018-no retinopathy.     Refill request is for a maintenance medication and has met the criteria specified in the Ambul

## 2019-09-19 NOTE — TELEPHONE ENCOUNTER
Pt is taking 1000mg tablet, 0.5 tablet BID. Refilled for 1 year 1/14/19. Current refill request refused due to refill is either a duplicate request or has active refills at the pharmacy. Check previous templates.     Requested Prescriptions     Refused

## 2019-09-19 NOTE — TELEPHONE ENCOUNTER
From: Estrellita Escalante  To: Cm Saab MD  Sent: 9/19/2019 2:59 PM CDT  Subject: Prescription Question    I heard back from [address] that my prescription for MetFormin was denied. Maybe this is because my last prescription was 1000mg.  For the las

## 2019-10-11 ENCOUNTER — TELEPHONE (OUTPATIENT)
Dept: RHEUMATOLOGY | Facility: CLINIC | Age: 74
End: 2019-10-11

## 2019-10-11 NOTE — TELEPHONE ENCOUNTER
Patient calling she states she forgot her appointment this morning for the shot Cimzia at 11:00 she need to reschedule please call her with a new appointment      Please advise

## 2019-10-14 ENCOUNTER — NURSE ONLY (OUTPATIENT)
Dept: RHEUMATOLOGY | Facility: CLINIC | Age: 74
End: 2019-10-14
Payer: MEDICARE

## 2019-10-14 DIAGNOSIS — M06.9 RHEUMATOID ARTHRITIS, INVOLVING UNSPECIFIED SITE, UNSPECIFIED RHEUMATOID FACTOR PRESENCE: Primary | ICD-10-CM

## 2019-10-14 PROCEDURE — 99211 OFF/OP EST MAY X REQ PHY/QHP: CPT | Performed by: INTERNAL MEDICINE

## 2019-10-14 PROCEDURE — 96372 THER/PROPH/DIAG INJ SC/IM: CPT | Performed by: INTERNAL MEDICINE

## 2019-10-14 PROCEDURE — G0463 HOSPITAL OUTPT CLINIC VISIT: HCPCS | Performed by: INTERNAL MEDICINE

## 2019-10-14 NOTE — PROGRESS NOTES
Name and  verified. Pt aware she was to receive injections of Cimza. Injections given in Left and Right Lower Abdomen, and pt tolerated well. Possible local side effects discussed with pt. Pt aware to follow up in 1 month  for next injection.

## 2019-10-16 ENCOUNTER — NURSE ONLY (OUTPATIENT)
Dept: INTERNAL MEDICINE CLINIC | Facility: CLINIC | Age: 74
End: 2019-10-16
Payer: MEDICARE

## 2019-10-16 DIAGNOSIS — E53.8 B12 DEFICIENCY: Primary | ICD-10-CM

## 2019-10-16 PROCEDURE — 90662 IIV NO PRSV INCREASED AG IM: CPT | Performed by: INTERNAL MEDICINE

## 2019-10-16 PROCEDURE — G0008 ADMIN INFLUENZA VIRUS VAC: HCPCS | Performed by: INTERNAL MEDICINE

## 2019-10-16 PROCEDURE — 96372 THER/PROPH/DIAG INJ SC/IM: CPT | Performed by: INTERNAL MEDICINE

## 2019-10-16 RX ADMIN — CYANOCOBALAMIN 1000 MCG: 1000 INJECTION INTRAMUSCULAR; SUBCUTANEOUS at 11:37:00

## 2019-10-16 NOTE — PROGRESS NOTES
Name and  verified. B12 administered right deltoid. Fluzone administered left deltoid. Pt tolerated w/o complaint.  No adverse reactions noted

## 2019-11-15 ENCOUNTER — LAB ENCOUNTER (OUTPATIENT)
Dept: LAB | Facility: HOSPITAL | Age: 74
End: 2019-11-15
Attending: INTERNAL MEDICINE
Payer: MEDICARE

## 2019-11-15 ENCOUNTER — NURSE ONLY (OUTPATIENT)
Dept: RHEUMATOLOGY | Facility: CLINIC | Age: 74
End: 2019-11-15
Payer: MEDICARE

## 2019-11-15 DIAGNOSIS — M06.9 RHEUMATOID ARTHRITIS, INVOLVING UNSPECIFIED SITE, UNSPECIFIED RHEUMATOID FACTOR PRESENCE: ICD-10-CM

## 2019-11-15 DIAGNOSIS — R80.9 MICROALBUMINURIA: ICD-10-CM

## 2019-11-15 DIAGNOSIS — M06.9 RHEUMATOID ARTHRITIS, INVOLVING UNSPECIFIED SITE, UNSPECIFIED RHEUMATOID FACTOR PRESENCE: Primary | ICD-10-CM

## 2019-11-15 DIAGNOSIS — Z51.81 ENCOUNTER FOR THERAPEUTIC DRUG MONITORING: ICD-10-CM

## 2019-11-15 PROCEDURE — 84460 ALANINE AMINO (ALT) (SGPT): CPT

## 2019-11-15 PROCEDURE — 80048 BASIC METABOLIC PNL TOTAL CA: CPT

## 2019-11-15 PROCEDURE — 86140 C-REACTIVE PROTEIN: CPT

## 2019-11-15 PROCEDURE — 36415 COLL VENOUS BLD VENIPUNCTURE: CPT

## 2019-11-15 PROCEDURE — 85025 COMPLETE CBC W/AUTO DIFF WBC: CPT

## 2019-11-15 PROCEDURE — 82040 ASSAY OF SERUM ALBUMIN: CPT

## 2019-11-15 PROCEDURE — 84450 TRANSFERASE (AST) (SGOT): CPT

## 2019-11-15 PROCEDURE — 96372 THER/PROPH/DIAG INJ SC/IM: CPT | Performed by: INTERNAL MEDICINE

## 2019-11-15 PROCEDURE — 85652 RBC SED RATE AUTOMATED: CPT

## 2019-11-15 NOTE — PROGRESS NOTES
Name and  verified. Pt aware she was to receive injection of Cimza. Injections given and pt tolerated well. Possible local side effects discussed with pt. Pt aware she is due for labs - she will complete today.  She will follow up in 4 weeks for next inj

## 2019-11-17 ENCOUNTER — TELEPHONE (OUTPATIENT)
Dept: INTERNAL MEDICINE CLINIC | Facility: CLINIC | Age: 74
End: 2019-11-17

## 2019-11-18 ENCOUNTER — NURSE ONLY (OUTPATIENT)
Dept: INTERNAL MEDICINE CLINIC | Facility: CLINIC | Age: 74
End: 2019-11-18
Payer: MEDICARE

## 2019-11-18 DIAGNOSIS — E53.8 VITAMIN B12 DEFICIENCY: ICD-10-CM

## 2019-11-18 PROCEDURE — 96372 THER/PROPH/DIAG INJ SC/IM: CPT | Performed by: INTERNAL MEDICINE

## 2019-11-18 RX ADMIN — CYANOCOBALAMIN 1000 MCG: 1000 INJECTION INTRAMUSCULAR; SUBCUTANEOUS at 11:10:00

## 2019-11-18 NOTE — PROGRESS NOTES
Patient presents for monthly b12 injection. Patient name and  verified. Order active in [address], last admin date 10/16/19. Patient tolerated well.

## 2019-11-18 NOTE — TELEPHONE ENCOUNTER
To nursing, please tell pt the lab test I requested to monitor the addition of lisinopril 2.5 mg in August--BMP--result is ok. Thanks.

## 2019-11-22 ENCOUNTER — PATIENT MESSAGE (OUTPATIENT)
Dept: INTERNAL MEDICINE CLINIC | Facility: CLINIC | Age: 74
End: 2019-11-22

## 2019-11-22 RX ORDER — GABAPENTIN 600 MG/1
600 TABLET ORAL 2 TIMES DAILY
Qty: 180 TABLET | Refills: 3 | Status: SHIPPED | OUTPATIENT
Start: 2019-11-22 | End: 2020-02-12

## 2019-11-22 NOTE — TELEPHONE ENCOUNTER
From: Balta Herrera  To: Eliseo Ray MD  Sent: 11/22/2019 4:22 PM CST  Subject: Prescription Question    I've been taking 600mg of Gabapentin twice a day for several months prescribed by Dr. Sravanthi Leach after my disc surgeries.  I stopped taking them after the

## 2019-11-25 RX ORDER — LEFLUNOMIDE 20 MG/1
TABLET ORAL
Qty: 90 TABLET | Refills: 0 | Status: SHIPPED | OUTPATIENT
Start: 2019-11-25 | End: 2020-04-02

## 2019-11-25 NOTE — TELEPHONE ENCOUNTER
Requested Prescriptions     Pending Prescriptions Disp Refills   • LEFLUNOMIDE 20 MG Oral Tab [Pharmacy Med Name: LEFLUNOMIDE 20 MG Tablet] 90 tablet 0     Sig: TAKE 1 TABLET EVERY DAY   last filled:   LOV: 8/26/2019  Future Appointments   Date Time Provid

## 2019-12-16 ENCOUNTER — NURSE ONLY (OUTPATIENT)
Dept: RHEUMATOLOGY | Facility: CLINIC | Age: 74
End: 2019-12-16
Payer: MEDICARE

## 2019-12-16 DIAGNOSIS — M06.9 RHEUMATOID ARTHRITIS, INVOLVING UNSPECIFIED SITE, UNSPECIFIED RHEUMATOID FACTOR PRESENCE: Primary | ICD-10-CM

## 2019-12-16 PROCEDURE — 96372 THER/PROPH/DIAG INJ SC/IM: CPT | Performed by: INTERNAL MEDICINE

## 2019-12-16 NOTE — PROGRESS NOTES
Pt presents for cimzia injection. Name and  verified. Cimzia injections given and pt tolerated well. Understands to return for next injection in 4 weeks. 1. RA [facility]rheumatoid [facility]) ([facility]  .  Seronegative rheumatoid arthritis -   Better in remisso bone mass (T-score between -1.0 and -2.5 at the femoral neck or spine) and a 10-year probability of a hip fracture ? 3% or a 10-year probability of a major osteoporosis-related fracture ?  20% based on the [place]-adapted [facility] algorith  Clinicians judgment and/

## 2019-12-17 ENCOUNTER — NURSE ONLY (OUTPATIENT)
Dept: INTERNAL MEDICINE CLINIC | Facility: CLINIC | Age: 74
End: 2019-12-17
Payer: MEDICARE

## 2019-12-17 DIAGNOSIS — E53.8 B12 DEFICIENCY: ICD-10-CM

## 2019-12-17 PROCEDURE — 96372 THER/PROPH/DIAG INJ SC/IM: CPT | Performed by: INTERNAL MEDICINE

## 2019-12-17 RX ADMIN — CYANOCOBALAMIN 1000 MCG: 1000 INJECTION INTRAMUSCULAR; SUBCUTANEOUS at 11:41:00

## 2019-12-17 NOTE — PROGRESS NOTES
Patient present for Vitamin B12 injection. Patient name and  verified. Injection well tolerated to left deltoid with no adverse reactions noted.

## 2020-01-10 RX ORDER — LISINOPRIL 2.5 MG/1
2.5 TABLET ORAL DAILY
Qty: 90 TABLET | Refills: 3 | Status: SHIPPED | OUTPATIENT
Start: 2020-01-10 | End: 2021-01-26

## 2020-01-10 NOTE — TELEPHONE ENCOUNTER
Refill request received for lisinopril. Not mentioned in last visit note 8/12/19. Routed to Dr. Thao Horn for review.

## 2020-01-11 NOTE — TELEPHONE ENCOUNTER
I sent refill for lisinopril 2.5 mg daily #90 with 3 refills to [address] (I last saw patient 8/12/19. In phone call 8/14/19, lisinopril 2.5 mg daily was added for elevated urine microalbumin).

## 2020-01-13 RX ORDER — SIMVASTATIN 40 MG
TABLET ORAL
Qty: 90 TABLET | Refills: 3 | Status: SHIPPED | OUTPATIENT
Start: 2020-01-13 | End: 2021-01-26

## 2020-01-17 NOTE — TELEPHONE ENCOUNTER
Please advise - to DR. Flaco Aguirre Patient with one or more new problems requiring additional work-up/treatment.

## 2020-01-20 ENCOUNTER — NURSE ONLY (OUTPATIENT)
Dept: INTERNAL MEDICINE CLINIC | Facility: CLINIC | Age: 75
End: 2020-01-20
Payer: MEDICARE

## 2020-01-20 DIAGNOSIS — E53.8 VITAMIN B12 DEFICIENCY: Primary | ICD-10-CM

## 2020-01-20 PROCEDURE — 96372 THER/PROPH/DIAG INJ SC/IM: CPT | Performed by: INTERNAL MEDICINE

## 2020-01-20 RX ADMIN — CYANOCOBALAMIN 1000 MCG: 1000 INJECTION INTRAMUSCULAR; SUBCUTANEOUS at 10:57:00

## 2020-01-20 NOTE — PROGRESS NOTES
Patient present for monthly Vitamin B12. Name/ and order verified.  1000mcg Vitamin B12 administered IM to LT deltoid, tolerated well

## 2020-01-22 ENCOUNTER — OFFICE VISIT (OUTPATIENT)
Dept: RHEUMATOLOGY | Facility: CLINIC | Age: 75
End: 2020-01-22
Payer: MEDICARE

## 2020-01-22 VITALS
WEIGHT: 112 LBS | BODY MASS INDEX: 23.51 KG/M2 | HEART RATE: 64 BPM | SYSTOLIC BLOOD PRESSURE: 147 MMHG | HEIGHT: 58 IN | RESPIRATION RATE: 16 BRPM | DIASTOLIC BLOOD PRESSURE: 77 MMHG

## 2020-01-22 DIAGNOSIS — M06.9 RHEUMATOID ARTHRITIS, INVOLVING UNSPECIFIED SITE, UNSPECIFIED RHEUMATOID FACTOR PRESENCE: Primary | ICD-10-CM

## 2020-01-22 DIAGNOSIS — Z51.81 ENCOUNTER FOR THERAPEUTIC DRUG MONITORING: ICD-10-CM

## 2020-01-22 PROCEDURE — G0463 HOSPITAL OUTPT CLINIC VISIT: HCPCS | Performed by: INTERNAL MEDICINE

## 2020-01-22 PROCEDURE — 99214 OFFICE O/P EST MOD 30 MIN: CPT | Performed by: INTERNAL MEDICINE

## 2020-01-22 PROCEDURE — 96372 THER/PROPH/DIAG INJ SC/IM: CPT | Performed by: INTERNAL MEDICINE

## 2020-01-22 NOTE — PROGRESS NOTES
Name and  verified. Pt aware he was to receive injection of Cimza. Injections given on Left/Right Lower Abdomen and pt tolerated well. Possible local side effects discussed with pt. Pt aware to follow up in 1 month for next injection.

## 2020-01-22 NOTE — PATIENT INSTRUCTIONS
1. Cont. Sulfasalazine -  1000mg three tiems day    2. Cont.  lelfunomide  20mg a day -    3. Cont.  cimzi 400mg a month.today   4. Return to clinic in 4 months.    5. Labs every 4 months -

## 2020-02-08 ENCOUNTER — PATIENT MESSAGE (OUTPATIENT)
Dept: RHEUMATOLOGY | Facility: CLINIC | Age: 75
End: 2020-02-08

## 2020-02-10 RX ORDER — SULFASALAZINE 500 MG/1
TABLET ORAL
Qty: 540 TABLET | Refills: 1 | Status: SHIPPED | OUTPATIENT
Start: 2020-02-10 | End: 2020-08-17

## 2020-02-10 NOTE — TELEPHONE ENCOUNTER
LOV: 1/22/20  Future Appointments   Date Time Provider William Ursula   2/18/2020 10:30 AM Héctor Tariq MD [facility] Schiller   2/19/2020 11:00 AM [facility] Arnier   2/21/2020 11:00 AM [facility]

## 2020-02-10 NOTE — TELEPHONE ENCOUNTER
From: Roman Mcarthur  To: Gisele Alcazar MD  Sent: 2/8/2020 1:54 PM CST  Subject: Prescription Question    Please send a request for Sulfasalazine 500 mg to the [facility] prescription drug plan which is [employer].  I need to receive a 3 month supply by [facility]

## 2020-02-12 RX ORDER — GABAPENTIN 600 MG/1
600 TABLET ORAL 2 TIMES DAILY
Qty: 180 TABLET | Refills: 1 | Status: SHIPPED | OUTPATIENT
Start: 2020-02-12 | End: 2020-08-18

## 2020-02-18 ENCOUNTER — LAB ENCOUNTER (OUTPATIENT)
Dept: LAB | Facility: HOSPITAL | Age: 75
End: 2020-02-18
Attending: INTERNAL MEDICINE
Payer: MEDICARE

## 2020-02-18 ENCOUNTER — HOSPITAL ENCOUNTER (OUTPATIENT)
Dept: GENERAL RADIOLOGY | Facility: HOSPITAL | Age: 75
Discharge: HOME | End: 2020-02-18
Attending: INTERNAL MEDICINE
Payer: MEDICARE

## 2020-02-18 ENCOUNTER — OFFICE VISIT (OUTPATIENT)
Dept: INTERNAL MEDICINE CLINIC | Facility: CLINIC | Age: 75
End: 2020-02-18
Payer: MEDICARE

## 2020-02-18 VITALS
DIASTOLIC BLOOD PRESSURE: 70 MMHG | TEMPERATURE: 98 F | BODY MASS INDEX: 23.93 KG/M2 | WEIGHT: 114 LBS | HEIGHT: 58 IN | SYSTOLIC BLOOD PRESSURE: 110 MMHG

## 2020-02-18 DIAGNOSIS — E11.9 DIABETES MELLITUS TYPE 2 WITHOUT RETINOPATHY (HCC): ICD-10-CM

## 2020-02-18 DIAGNOSIS — D64.9 MILD CHRONIC ANEMIA: ICD-10-CM

## 2020-02-18 DIAGNOSIS — Z51.81 ENCOUNTER FOR THERAPEUTIC DRUG MONITORING: ICD-10-CM

## 2020-02-18 DIAGNOSIS — Z98.890 HISTORY OF LUMBAR DISCECTOMY: ICD-10-CM

## 2020-02-18 DIAGNOSIS — M54.50 ACUTE BILATERAL LOW BACK PAIN WITHOUT SCIATICA: ICD-10-CM

## 2020-02-18 DIAGNOSIS — M06.9 RHEUMATOID ARTHRITIS, INVOLVING UNSPECIFIED SITE, UNSPECIFIED RHEUMATOID FACTOR PRESENCE: ICD-10-CM

## 2020-02-18 DIAGNOSIS — M54.50 ACUTE BILATERAL LOW BACK PAIN WITHOUT SCIATICA: Primary | ICD-10-CM

## 2020-02-18 LAB
ALBUMIN SERPL-MCNC: 3.6 G/DL (ref 3.4–5)
ALT SERPL-CCNC: 16 U/L (ref 13–56)
AST SERPL-CCNC: 13 U/L (ref 15–37)
BASOPHILS # BLD AUTO: 0.08 X10(3) UL (ref 0–0.2)
BASOPHILS NFR BLD AUTO: 0.8 %
CREAT BLD-MCNC: 0.9 MG/DL (ref 0.55–1.02)
CRP SERPL-MCNC: <0.29 MG/DL (ref ?–0.3)
DEPRECATED RDW RBC AUTO: 47.1 FL (ref 35.1–46.3)
EOSINOPHIL # BLD AUTO: 0.37 X10(3) UL (ref 0–0.7)
EOSINOPHIL NFR BLD AUTO: 3.8 %
ERYTHROCYTE [DISTWIDTH] IN BLOOD BY AUTOMATED COUNT: 13.2 % (ref 11–15)
ERYTHROCYTE [SEDIMENTATION RATE] IN BLOOD: 43 MM/HR (ref 0–30)
EST. AVERAGE GLUCOSE BLD GHB EST-MCNC: 123 MG/DL (ref 68–126)
HBA1C MFR BLD HPLC: 5.9 % (ref ?–5.7)
HCT VFR BLD AUTO: 35.2 % (ref 35–48)
HGB BLD-MCNC: 11.1 G/DL (ref 12–16)
IMM GRANULOCYTES # BLD AUTO: 0.04 X10(3) UL (ref 0–1)
IMM GRANULOCYTES NFR BLD: 0.4 %
LYMPHOCYTES # BLD AUTO: 2.81 X10(3) UL (ref 1–4)
LYMPHOCYTES NFR BLD AUTO: 28.8 %
MCH RBC QN AUTO: 30.7 PG (ref 26–34)
MCHC RBC AUTO-ENTMCNC: 31.5 G/DL (ref 31–37)
MCV RBC AUTO: 97.2 FL (ref 80–100)
MONOCYTES # BLD AUTO: 1.06 X10(3) UL (ref 0.1–1)
MONOCYTES NFR BLD AUTO: 10.9 %
NEUTROPHILS # BLD AUTO: 5.39 X10 (3) UL (ref 1.5–7.7)
NEUTROPHILS # BLD AUTO: 5.39 X10(3) UL (ref 1.5–7.7)
NEUTROPHILS NFR BLD AUTO: 55.3 %
PLATELET # BLD AUTO: 293 10(3)UL (ref 150–450)
RBC # BLD AUTO: 3.62 X10(6)UL (ref 3.8–5.3)
WBC # BLD AUTO: 9.8 X10(3) UL (ref 4–11)

## 2020-02-18 PROCEDURE — 72110 X-RAY EXAM L-2 SPINE 4/>VWS: CPT | Performed by: INTERNAL MEDICINE

## 2020-02-18 PROCEDURE — 86140 C-REACTIVE PROTEIN: CPT

## 2020-02-18 PROCEDURE — 36415 COLL VENOUS BLD VENIPUNCTURE: CPT

## 2020-02-18 PROCEDURE — 84450 TRANSFERASE (AST) (SGOT): CPT

## 2020-02-18 PROCEDURE — 85025 COMPLETE CBC W/AUTO DIFF WBC: CPT

## 2020-02-18 PROCEDURE — 82040 ASSAY OF SERUM ALBUMIN: CPT

## 2020-02-18 PROCEDURE — 82565 ASSAY OF CREATININE: CPT

## 2020-02-18 PROCEDURE — G0463 HOSPITAL OUTPT CLINIC VISIT: HCPCS | Performed by: INTERNAL MEDICINE

## 2020-02-18 PROCEDURE — 96372 THER/PROPH/DIAG INJ SC/IM: CPT | Performed by: INTERNAL MEDICINE

## 2020-02-18 PROCEDURE — 85652 RBC SED RATE AUTOMATED: CPT

## 2020-02-18 PROCEDURE — 84460 ALANINE AMINO (ALT) (SGPT): CPT

## 2020-02-18 PROCEDURE — 99214 OFFICE O/P EST MOD 30 MIN: CPT | Performed by: INTERNAL MEDICINE

## 2020-02-18 PROCEDURE — 83036 HEMOGLOBIN GLYCOSYLATED A1C: CPT

## 2020-02-18 RX ORDER — CYCLOBENZAPRINE HCL 5 MG
5 TABLET ORAL 3 TIMES DAILY PRN
Qty: 20 TABLET | Refills: 1 | Status: SHIPPED | OUTPATIENT
Start: 2020-02-18 | End: 2020-02-27

## 2020-02-18 RX ADMIN — CYANOCOBALAMIN 1000 MCG: 1000 INJECTION INTRAMUSCULAR; SUBCUTANEOUS at 10:38:00

## 2020-02-18 NOTE — PROGRESS NOTES
Fernando Rowell is a 69year old female who presents for     Last seen 8/12/19    Here for regular 6 mo check     Low back pain--pain across low back. Started 2-3 wks ago. No fall or injury. Does water aerobics and walking.    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     • Recurrent cold sores     takes acyclovir prn (Dr Rachel Gama rx in about 2014)   • Rheumatoid arthritis [facility]) 2010    sees Dr Leonidas Black   • [facility] quittin.1      Smokeless tobacco: Never Used    Alcohol use:  Yes      Alcohol/week: 0.0 standard drinks      Comment: rarely    Drug use: No         Allergies:  Not on File     Review of systems:  Constitutional:  No fever, loss of appetite or uninten herniated disc L4-L5. Dr. Louis Corrigan  Residual L left foot drop--has AFO brace. Anemia multifactorial--Fe and B12 defic -chronic disease  hgb 10.5 on 11/15/19 per rheumatology–was 11.6 on 7/29/19--was 11.2 on 1/9/19.   Vit B12 1000 mcg IM monthly since 8/ time was spent counseling the patient and/or coordinating care. - XR LUMBAR SPINE (MIN 4 VIEWS) (CPT=72110); Future    - CBC WITH DIFFERENTIAL WITH PLATELET;  Future    - HEMOGLOBIN A1C; Future          Current Outpatient Medications   Medication Sig Dis

## 2020-02-19 ENCOUNTER — TELEPHONE (OUTPATIENT)
Dept: INTERNAL MEDICINE CLINIC | Facility: CLINIC | Age: 75
End: 2020-02-19

## 2020-02-19 NOTE — TELEPHONE ENCOUNTER
Spoke to patient and relayed MD message and instructions, patient verbalizes understanding and agrees with plan.

## 2020-02-19 NOTE — TELEPHONE ENCOUNTER
To nursing, please tell patient     x-ray lumbar spine shows curvature (scoliosis) and degenerative arthritis at multiple levels. There is no fracture or malalignment. Incidentally seen is a gallstone–no treatment needed as asymptomatic.   If her back drummond

## 2020-02-21 ENCOUNTER — NURSE ONLY (OUTPATIENT)
Dept: RHEUMATOLOGY | Facility: CLINIC | Age: 75
End: 2020-02-21
Payer: MEDICARE

## 2020-02-21 DIAGNOSIS — M06.9 RHEUMATOID ARTHRITIS, INVOLVING UNSPECIFIED SITE, UNSPECIFIED RHEUMATOID FACTOR PRESENCE: Primary | ICD-10-CM

## 2020-02-21 PROCEDURE — 96372 THER/PROPH/DIAG INJ SC/IM: CPT | Performed by: INTERNAL MEDICINE

## 2020-02-21 NOTE — PROGRESS NOTES
Name and  verified. Pt aware he was to receive injection of Cimza. Injections given to left and right lower abdomen, pt tolerated well. Possible local side effects discussed with pt.    Pt aware to follow up in 4 weeks for next injection and to follow up

## 2020-02-27 ENCOUNTER — PATIENT MESSAGE (OUTPATIENT)
Dept: INTERNAL MEDICINE CLINIC | Facility: CLINIC | Age: 75
End: 2020-02-27

## 2020-02-27 RX ORDER — TIZANIDINE 2 MG/1
2 TABLET ORAL 3 TIMES DAILY PRN
Qty: 21 TABLET | Refills: 2 | Status: SHIPPED | OUTPATIENT
Start: 2020-02-27 | End: 2020-09-09

## 2020-03-25 ENCOUNTER — TELEPHONE (OUTPATIENT)
Dept: RHEUMATOLOGY | Facility: CLINIC | Age: 75
End: 2020-03-25

## 2020-03-25 DIAGNOSIS — M81.0 AGE-RELATED OSTEOPOROSIS WITHOUT CURRENT PATHOLOGICAL FRACTURE: Primary | ICD-10-CM

## 2020-03-25 NOTE — TELEPHONE ENCOUNTER
Benefits investigation for Prolia: Benefits subject to a $198 deductible (met) and 20& co-insurance for the administration and cost of Prolia.  Secondary insurance: Medicare Supplement Plan F and it covers the Medicare Part B deductible, co-insurance and 10

## 2020-03-26 NOTE — TELEPHONE ENCOUNTER
Left message to call back to schedule Prolia injection. She is due around 4/14, office is booking the end of April and she need to have lab completed 1 to 2 days prior to appointment.

## 2020-03-30 RX ORDER — HYDROCHLOROTHIAZIDE 12.5 MG/1
12.5 TABLET ORAL
Qty: 90 TABLET | Refills: 3 | Status: SHIPPED | OUTPATIENT
Start: 2020-03-30 | End: 2021-01-26

## 2020-04-01 ENCOUNTER — PATIENT MESSAGE (OUTPATIENT)
Dept: RHEUMATOLOGY | Facility: CLINIC | Age: 75
End: 2020-04-01

## 2020-04-01 NOTE — TELEPHONE ENCOUNTER
From: Chip Rodas  To: Elwood Prader, MD  Sent: 4/1/2020 2:03 PM CDT  Subject: Prescription Question    Have you received a request from [facility] ([facility] to authorize a new prescription for Leflunomide?

## 2020-04-02 RX ORDER — LEFLUNOMIDE 20 MG/1
20 TABLET ORAL
Qty: 90 TABLET | Refills: 1 | Status: SHIPPED | OUTPATIENT
Start: 2020-04-02 | End: 2020-08-17

## 2020-04-02 NOTE — TELEPHONE ENCOUNTER
Last filled:11/25/2019 #90 with no refills     LOV: 1/22/2020  No future appointments. Labs:     Component      Latest Ref Rng & Units 2/18/2020   CREATININE      0.55 - 1.02 mg/dL 0.90   eGFR NON-AFR.  AMERICAN      >=60 64   eGFR       >=

## 2020-04-15 ENCOUNTER — TELEPHONE (OUTPATIENT)
Dept: INTERNAL MEDICINE CLINIC | Facility: CLINIC | Age: 75
End: 2020-04-15

## 2020-04-15 RX ORDER — METOPROLOL TARTRATE 50 MG/1
50 TABLET, FILM COATED ORAL 2 TIMES DAILY
Qty: 180 TABLET | Refills: 3 | Status: SHIPPED | OUTPATIENT
Start: 2020-04-15 | End: 2021-03-02

## 2020-04-15 NOTE — TELEPHONE ENCOUNTER
Fax from [facility] asking for a refill for:    Metoprol 50mg - #90    Ax to:  [phone]

## 2020-05-21 ENCOUNTER — TELEPHONE (OUTPATIENT)
Dept: RHEUMATOLOGY | Facility: CLINIC | Age: 75
End: 2020-05-21

## 2020-05-22 ENCOUNTER — NURSE ONLY (OUTPATIENT)
Dept: RHEUMATOLOGY | Facility: CLINIC | Age: 75
End: 2020-05-22
Payer: MEDICARE

## 2020-05-22 DIAGNOSIS — M06.9 RHEUMATOID ARTHRITIS, INVOLVING UNSPECIFIED SITE, UNSPECIFIED RHEUMATOID FACTOR PRESENCE: Primary | ICD-10-CM

## 2020-05-22 PROCEDURE — 96372 THER/PROPH/DIAG INJ SC/IM: CPT | Performed by: INTERNAL MEDICINE

## 2020-05-22 NOTE — PROGRESS NOTES
Name and  verified. Patient ware she was to receive injection of Cimza. Injections given on Left/Right Lower Abdomen and patient tolerated well. Possible local side effects discussed with pt.  Patient aware to follow up in 1 month for next injection and

## 2020-06-22 ENCOUNTER — LAB ENCOUNTER (OUTPATIENT)
Dept: LAB | Facility: HOSPITAL | Age: 75
End: 2020-06-22
Attending: INTERNAL MEDICINE
Payer: MEDICARE

## 2020-06-22 ENCOUNTER — OFFICE VISIT (OUTPATIENT)
Dept: RHEUMATOLOGY | Facility: CLINIC | Age: 75
End: 2020-06-22
Payer: MEDICARE

## 2020-06-22 VITALS
WEIGHT: 107.31 LBS | BODY MASS INDEX: 22.52 KG/M2 | SYSTOLIC BLOOD PRESSURE: 104 MMHG | DIASTOLIC BLOOD PRESSURE: 64 MMHG | HEART RATE: 77 BPM | HEIGHT: 58 IN

## 2020-06-22 DIAGNOSIS — Z51.81 THERAPEUTIC DRUG MONITORING: ICD-10-CM

## 2020-06-22 DIAGNOSIS — Z51.81 ENCOUNTER FOR THERAPEUTIC DRUG MONITORING: ICD-10-CM

## 2020-06-22 DIAGNOSIS — M06.9 RHEUMATOID ARTHRITIS, INVOLVING UNSPECIFIED SITE, UNSPECIFIED RHEUMATOID FACTOR PRESENCE: ICD-10-CM

## 2020-06-22 DIAGNOSIS — M81.0 AGE-RELATED OSTEOPOROSIS WITHOUT CURRENT PATHOLOGICAL FRACTURE: ICD-10-CM

## 2020-06-22 DIAGNOSIS — M06.9 RHEUMATOID ARTHRITIS, INVOLVING UNSPECIFIED SITE, UNSPECIFIED RHEUMATOID FACTOR PRESENCE: Primary | ICD-10-CM

## 2020-06-22 PROCEDURE — 86140 C-REACTIVE PROTEIN: CPT

## 2020-06-22 PROCEDURE — 82040 ASSAY OF SERUM ALBUMIN: CPT

## 2020-06-22 PROCEDURE — G0463 HOSPITAL OUTPT CLINIC VISIT: HCPCS | Performed by: INTERNAL MEDICINE

## 2020-06-22 PROCEDURE — 85652 RBC SED RATE AUTOMATED: CPT

## 2020-06-22 PROCEDURE — 96372 THER/PROPH/DIAG INJ SC/IM: CPT | Performed by: INTERNAL MEDICINE

## 2020-06-22 PROCEDURE — 84450 TRANSFERASE (AST) (SGOT): CPT

## 2020-06-22 PROCEDURE — 99214 OFFICE O/P EST MOD 30 MIN: CPT | Performed by: INTERNAL MEDICINE

## 2020-06-22 PROCEDURE — 80048 BASIC METABOLIC PNL TOTAL CA: CPT

## 2020-06-22 PROCEDURE — 85025 COMPLETE CBC W/AUTO DIFF WBC: CPT

## 2020-06-22 PROCEDURE — 84460 ALANINE AMINO (ALT) (SGPT): CPT

## 2020-06-22 PROCEDURE — 36415 COLL VENOUS BLD VENIPUNCTURE: CPT

## 2020-06-22 NOTE — PATIENT INSTRUCTIONS
1. Cont. Sulfasalazine -  1000mg three tiems day    2. Cont.  lelfunomide  20mg a day -    3. Cont.  cimzi 400mg a month.today   4. Return to clinic in 4 months. 5. Labs every 4 months -   6.  Will monitor right shoulder and right wrisst - if not better

## 2020-06-22 NOTE — PROGRESS NOTES
Althea Guerra is a 69year old female. HPI:   Patient presents with:  Rheumatoid Arthritis  Hand Pain      I had the pleasure of seeing Althea Guerra on 6/22/2020.   As you recall she is extremely pleasant 80-year-old who's a history of seronegative rheuma with the the left foot and toe - had a left foot drop  Sciatica pain is better  The RA is not bad. She feels it's mostly her arm being in a sling that she's not being able to do things.      8/26/2019  She had sciatica pian in 1/2019 0 and has had left foot Selina   • Hyperlipidemia, mixed    • Hypertension, essential    • Lumbar disc disease 2019   • Osteoarthritis    • Osteopenia     dexa 2008-osteopenia.     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 As needed for cold sores 21 tablet 3   • aspirin 325 MG Oral Tab Take 325 mg by mouth daily.      • Calcium Carb-Cholecalciferol (CALCIUM 500 +D OR) Take  by m Absolute      0.00 - 0.20 x10(3) uL 0.08   Immature Granulocyte Absolute      0.00 - 1.00 x10(3) uL 0.04   Neutrophils %      % 55.3   Lymphocytes %      % 28.8   Monocytes %      % 10.9   Eosinophils %      % 3.8   Basophils %      % 0.8   Immature Granul function issues is her left foot driop from back sx -    -switched to leflunomide 10mg a day on 3/2016 - from methotrexate 15mg a week   - stopped leflunomide in beginning of 3/2017 - b/c of expense but went back on b/c of her pain being bad   3/2018 - casey

## 2020-06-29 ENCOUNTER — TELEPHONE (OUTPATIENT)
Dept: INTERNAL MEDICINE CLINIC | Facility: CLINIC | Age: 75
End: 2020-06-29

## 2020-06-29 DIAGNOSIS — Z12.31 VISIT FOR SCREENING MAMMOGRAM: Primary | ICD-10-CM

## 2020-06-29 NOTE — TELEPHONE ENCOUNTER
To nursing, please tell patient mammogram order entered. She is not due for the mammogram until after 7/29/2020. (Left mammogram 7/29/19). Thanks. 1. Visit for screening mammogram  - [facility] JODEE 2D+3D SCREENING BILAT (CPT=77067/58798);  Future

## 2020-07-20 ENCOUNTER — NURSE ONLY (OUTPATIENT)
Dept: RHEUMATOLOGY | Facility: CLINIC | Age: 75
End: 2020-07-20
Payer: MEDICARE

## 2020-07-20 DIAGNOSIS — M06.9 RHEUMATOID ARTHRITIS, INVOLVING UNSPECIFIED SITE, UNSPECIFIED RHEUMATOID FACTOR PRESENCE: Primary | ICD-10-CM

## 2020-07-20 PROCEDURE — 96372 THER/PROPH/DIAG INJ SC/IM: CPT | Performed by: INTERNAL MEDICINE

## 2020-07-20 NOTE — PROGRESS NOTES
Name and  verified. Patient aware she was to receive injection of Cimza. Injections given on Left/Right Lower Abdomen SQ and patient tolerated well. Possible local side effects discussed with patient.  Patient aware to follow up in 1 monthfor next inject

## 2020-07-24 ENCOUNTER — TELEPHONE (OUTPATIENT)
Dept: INTERNAL MEDICINE CLINIC | Facility: CLINIC | Age: 75
End: 2020-07-24

## 2020-07-24 NOTE — TELEPHONE ENCOUNTER
[facility] requesting NEW RX for:  Simvastatin Tab 40MG  Tasked to [employer]

## 2020-07-30 ENCOUNTER — HOSPITAL ENCOUNTER (OUTPATIENT)
Dept: MAMMOGRAPHY | Facility: HOSPITAL | Age: 75
Discharge: HOME | End: 2020-07-30
Attending: INTERNAL MEDICINE
Payer: MEDICARE

## 2020-07-30 DIAGNOSIS — Z12.31 VISIT FOR SCREENING MAMMOGRAM: ICD-10-CM

## 2020-07-30 PROCEDURE — 77063 BREAST TOMOSYNTHESIS BI: CPT | Performed by: INTERNAL MEDICINE

## 2020-07-30 PROCEDURE — 77067 SCR MAMMO BI INCL CAD: CPT | Performed by: INTERNAL MEDICINE

## 2020-08-17 RX ORDER — SULFASALAZINE 500 MG/1
TABLET ORAL
Qty: 540 TABLET | Refills: 1 | Status: SHIPPED | OUTPATIENT
Start: 2020-08-17 | End: 2021-08-09

## 2020-08-17 RX ORDER — LEFLUNOMIDE 20 MG/1
TABLET ORAL
Qty: 90 TABLET | Refills: 1 | Status: SHIPPED | OUTPATIENT
Start: 2020-08-17 | End: 2021-05-24

## 2020-08-17 NOTE — TELEPHONE ENCOUNTER
Sulfasalazine  Last filled: 2/10/2020 #540 tab with 1 refill     Leflunomide  Last filled: 4/2/2020 #90 tab with 1 refill    LOV: 6/22/2020  Future Appointments   Date Time Provider William Vergara   8/19/2020 11:00 AM [facility] <0.30 mg/dL <0.29   SED RATE      0 - 30 mm/Hr 28       Summary:  1. Cont. Sulfasalazine -  1000mg three tiems day    2. Cont.  lelfunomide  20mg a day -    3. Cont.  cimzi 400mg a month.today   4. Return to clinic in 4 months.    5. Labs every 4 months -

## 2020-08-18 RX ORDER — GABAPENTIN 600 MG/1
TABLET ORAL
Qty: 180 TABLET | Refills: 0 | Status: SHIPPED | OUTPATIENT
Start: 2020-08-18 | End: 2020-11-03

## 2020-08-19 ENCOUNTER — NURSE ONLY (OUTPATIENT)
Dept: RHEUMATOLOGY | Facility: CLINIC | Age: 75
End: 2020-08-19
Payer: MEDICARE

## 2020-08-19 DIAGNOSIS — M06.9 RHEUMATOID ARTHRITIS, INVOLVING UNSPECIFIED SITE, UNSPECIFIED RHEUMATOID FACTOR PRESENCE: Primary | ICD-10-CM

## 2020-08-19 PROCEDURE — 96372 THER/PROPH/DIAG INJ SC/IM: CPT | Performed by: INTERNAL MEDICINE

## 2020-08-19 NOTE — PROGRESS NOTES
Name and  verified. Patient aware she will be  receiving injection of Cimza. Injections given Left/Right Lower abdominal and patient tolerated well. Possible local side effects discussed with patient.  Patient aware to follow up in 4 weeks for next injec

## 2020-09-08 NOTE — PROGRESS NOTES
Odilon Oates is a 77year old female who presents for     Check at 7 months and Medicare annual.    Feels good. \"I have not had a B12 shot since January or February b/c I didn't want to come in here due to [place]. I don't think I need. \"    Low back pa Anali rx in about 2014)   • Rheumatoid arthritis [facility]) 2010    sees Dr Antonio Drafts   • Type 2 diabetes mellitus ([facility] 2005    oral medication. • Vitamin B12 deficiency 07/2017    Vitamin B12 level low at 126 on 7/28/17.       Past Surgical History:   Pro use: No         Allergies:  No Known Allergies     Review of systems:  Constitutional:  No fever, loss of appetite or unintentional weight loss  Respiratory: No cough, wheezing or dyspnea  Cardiac: No chest pain or palpitations  Gastrointestinal: No abdomi exercises. 2/18/20–x-ray lumbar –dextroscoliosis, multilevel lumbar spondylosis, incidental gallstone. Hx removal Lumbar herniated disc   Laminectomy 2/26/19- excision herniated disc L4-5 - Dr Maurilio Mcdaniels.    Re-exploration 4/9/19- resection herniated di METABOLIC PANEL (14); Future  - LIPID PANEL; Future  - TSH W REFLEX TO FREE T4; Future  - URINALYSIS WITH CULTURE REFLEX  - HEMOGLOBIN A1C; Future  - MICROALB/CREAT RATIO, RANDOM URINE;  Future  - VITAMIN B12; Future    - FLU VACC HIGH DOSE PRSV FREE      C Exercise;Stretching    How would you describe your daily physical activity?: Moderate    How would you describe your current health state?: Good    How do you maintain positive mental well-being?: Social Interaction;Games; Visiting Family;Puzzles; Visiting F Assessment (Required for AWV/SWV)      Hearing Screening    Screening Method:  Whisper Test  Whisper Test Result:  Pass             Visual Acuity     Right Eye Visual Acuity: Corrected Left Eye Visual Acuity: Corrected   Right Eye Chart Acuity: 20/200 Left

## 2020-09-09 ENCOUNTER — LAB ENCOUNTER (OUTPATIENT)
Dept: LAB | Age: 75
End: 2020-09-09
Attending: INTERNAL MEDICINE
Payer: MEDICARE

## 2020-09-09 ENCOUNTER — OFFICE VISIT (OUTPATIENT)
Dept: INTERNAL MEDICINE CLINIC | Facility: CLINIC | Age: 75
End: 2020-09-09
Payer: MEDICARE

## 2020-09-09 ENCOUNTER — TELEPHONE (OUTPATIENT)
Dept: INTERNAL MEDICINE CLINIC | Facility: CLINIC | Age: 75
End: 2020-09-09

## 2020-09-09 VITALS
OXYGEN SATURATION: 96 % | TEMPERATURE: 97 F | WEIGHT: 106.19 LBS | HEIGHT: 57.4 IN | DIASTOLIC BLOOD PRESSURE: 60 MMHG | BODY MASS INDEX: 22.6 KG/M2 | SYSTOLIC BLOOD PRESSURE: 118 MMHG | HEART RATE: 94 BPM

## 2020-09-09 DIAGNOSIS — M06.00 RHEUMATOID ARTHRITIS WITH NEGATIVE RHEUMATOID FACTOR, INVOLVING UNSPECIFIED SITE (HCC): ICD-10-CM

## 2020-09-09 DIAGNOSIS — E53.8 VITAMIN B 12 DEFICIENCY: ICD-10-CM

## 2020-09-09 DIAGNOSIS — E11.9 DIABETES MELLITUS TYPE 2 WITHOUT RETINOPATHY (HCC): ICD-10-CM

## 2020-09-09 DIAGNOSIS — E78.2 HYPERLIPIDEMIA, MIXED: ICD-10-CM

## 2020-09-09 DIAGNOSIS — Z23 NEED FOR INFLUENZA VACCINATION: ICD-10-CM

## 2020-09-09 DIAGNOSIS — Z95.1 S/P CABG X 3: ICD-10-CM

## 2020-09-09 DIAGNOSIS — I10 ESSENTIAL HYPERTENSION: ICD-10-CM

## 2020-09-09 DIAGNOSIS — M47.816 ARTHRITIS, LUMBAR SPINE: ICD-10-CM

## 2020-09-09 DIAGNOSIS — Z00.00 MEDICARE ANNUAL WELLNESS VISIT, SUBSEQUENT: Primary | ICD-10-CM

## 2020-09-09 DIAGNOSIS — D64.9 CHRONIC ANEMIA: ICD-10-CM

## 2020-09-09 LAB
ALBUMIN SERPL-MCNC: 3.7 G/DL (ref 3.4–5)
ALBUMIN/GLOB SERPL: 0.9 {RATIO} (ref 1–2)
ALP LIVER SERPL-CCNC: 54 U/L (ref 55–142)
ALT SERPL-CCNC: 20 U/L (ref 13–56)
ANION GAP SERPL CALC-SCNC: 6 MMOL/L (ref 0–18)
AST SERPL-CCNC: 21 U/L (ref 15–37)
BACTERIA UR QL AUTO: NEGATIVE /HPF
BASOPHILS # BLD AUTO: 0.1 X10(3) UL (ref 0–0.2)
BASOPHILS NFR BLD AUTO: 1.2 %
BILIRUB SERPL-MCNC: 0.3 MG/DL (ref 0.1–2)
BILIRUB UR QL: NEGATIVE
BUN BLD-MCNC: 25 MG/DL (ref 7–18)
BUN/CREAT SERPL: 21.9 (ref 10–20)
CALCIUM BLD-MCNC: 9.6 MG/DL (ref 8.5–10.1)
CHLORIDE SERPL-SCNC: 106 MMOL/L (ref 98–112)
CHOLEST SMN-MCNC: 191 MG/DL (ref ?–200)
CLARITY UR: CLEAR
CO2 SERPL-SCNC: 29 MMOL/L (ref 21–32)
COLOR UR: YELLOW
CREAT BLD-MCNC: 1.14 MG/DL (ref 0.55–1.02)
CREAT UR-SCNC: 188 MG/DL
DEPRECATED RDW RBC AUTO: 46.3 FL (ref 35.1–46.3)
EOSINOPHIL # BLD AUTO: 0.37 X10(3) UL (ref 0–0.7)
EOSINOPHIL NFR BLD AUTO: 4.3 %
ERYTHROCYTE [DISTWIDTH] IN BLOOD BY AUTOMATED COUNT: 13 % (ref 11–15)
EST. AVERAGE GLUCOSE BLD GHB EST-MCNC: 105 MG/DL (ref 68–126)
GLOBULIN PLAS-MCNC: 3.9 G/DL (ref 2.8–4.4)
GLUCOSE BLD-MCNC: 109 MG/DL (ref 70–99)
GLUCOSE UR-MCNC: NEGATIVE MG/DL
HBA1C MFR BLD HPLC: 5.3 % (ref ?–5.7)
HCT VFR BLD AUTO: 34.3 % (ref 35–48)
HDLC SERPL-MCNC: 64 MG/DL (ref 40–59)
HGB BLD-MCNC: 10.8 G/DL (ref 12–16)
HGB UR QL STRIP.AUTO: NEGATIVE
IMM GRANULOCYTES # BLD AUTO: 0.03 X10(3) UL (ref 0–1)
IMM GRANULOCYTES NFR BLD: 0.3 %
KETONES UR-MCNC: NEGATIVE MG/DL
LDLC SERPL CALC-MCNC: 91 MG/DL (ref ?–100)
LEUKOCYTE ESTERASE UR QL STRIP.AUTO: NEGATIVE
LYMPHOCYTES # BLD AUTO: 2.63 X10(3) UL (ref 1–4)
LYMPHOCYTES NFR BLD AUTO: 30.5 %
M PROTEIN MFR SERPL ELPH: 7.6 G/DL (ref 6.4–8.2)
MCH RBC QN AUTO: 31.1 PG (ref 26–34)
MCHC RBC AUTO-ENTMCNC: 31.5 G/DL (ref 31–37)
MCV RBC AUTO: 98.8 FL (ref 80–100)
MICROALBUMIN UR-MCNC: 6.2 MG/DL
MICROALBUMIN/CREAT 24H UR-RTO: 33 UG/MG (ref ?–30)
MONOCYTES # BLD AUTO: 1.03 X10(3) UL (ref 0.1–1)
MONOCYTES NFR BLD AUTO: 11.9 %
NEUTROPHILS # BLD AUTO: 4.47 X10 (3) UL (ref 1.5–7.7)
NEUTROPHILS # BLD AUTO: 4.47 X10(3) UL (ref 1.5–7.7)
NEUTROPHILS NFR BLD AUTO: 51.8 %
NITRITE UR QL STRIP.AUTO: NEGATIVE
NONHDLC SERPL-MCNC: 127 MG/DL (ref ?–130)
OSMOLALITY SERPL CALC.SUM OF ELEC: 297 MOSM/KG (ref 275–295)
PATIENT FASTING Y/N/NP: YES
PATIENT FASTING Y/N/NP: YES
PH UR: 5 [PH] (ref 5–8)
PLATELET # BLD AUTO: 299 10(3)UL (ref 150–450)
POTASSIUM SERPL-SCNC: 4.6 MMOL/L (ref 3.5–5.1)
PROT UR-MCNC: 30 MG/DL
RBC # BLD AUTO: 3.47 X10(6)UL (ref 3.8–5.3)
RBC #/AREA URNS AUTO: <1 /HPF
SODIUM SERPL-SCNC: 141 MMOL/L (ref 136–145)
SP GR UR STRIP: 1.03 (ref 1–1.03)
TRIGL SERPL-MCNC: 182 MG/DL (ref 30–149)
TSI SER-ACNC: 2.93 MIU/ML (ref 0.36–3.74)
UROBILINOGEN UR STRIP-ACNC: <2
VIT B12 SERPL-MCNC: 691 PG/ML (ref 193–986)
VLDLC SERPL CALC-MCNC: 36 MG/DL (ref 0–30)
WBC # BLD AUTO: 8.6 X10(3) UL (ref 4–11)
WBC #/AREA URNS AUTO: <1 /HPF

## 2020-09-09 PROCEDURE — 90662 IIV NO PRSV INCREASED AG IM: CPT | Performed by: INTERNAL MEDICINE

## 2020-09-09 PROCEDURE — 83036 HEMOGLOBIN GLYCOSYLATED A1C: CPT

## 2020-09-09 PROCEDURE — 80053 COMPREHEN METABOLIC PANEL: CPT

## 2020-09-09 PROCEDURE — 99214 OFFICE O/P EST MOD 30 MIN: CPT | Performed by: INTERNAL MEDICINE

## 2020-09-09 PROCEDURE — G0439 PPPS, SUBSEQ VISIT: HCPCS | Performed by: INTERNAL MEDICINE

## 2020-09-09 PROCEDURE — 80061 LIPID PANEL: CPT

## 2020-09-09 PROCEDURE — 36415 COLL VENOUS BLD VENIPUNCTURE: CPT

## 2020-09-09 PROCEDURE — G0008 ADMIN INFLUENZA VIRUS VAC: HCPCS | Performed by: INTERNAL MEDICINE

## 2020-09-09 PROCEDURE — 82043 UR ALBUMIN QUANTITATIVE: CPT

## 2020-09-09 PROCEDURE — 82607 VITAMIN B-12: CPT

## 2020-09-09 PROCEDURE — 84443 ASSAY THYROID STIM HORMONE: CPT

## 2020-09-09 PROCEDURE — 85025 COMPLETE CBC W/AUTO DIFF WBC: CPT

## 2020-09-09 PROCEDURE — 81001 URINALYSIS AUTO W/SCOPE: CPT | Performed by: INTERNAL MEDICINE

## 2020-09-09 PROCEDURE — 82570 ASSAY OF URINE CREATININE: CPT

## 2020-09-09 PROCEDURE — G0463 HOSPITAL OUTPT CLINIC VISIT: HCPCS | Performed by: INTERNAL MEDICINE

## 2020-09-09 NOTE — TELEPHONE ENCOUNTER
To nursing, please tell patient lab results are overall ok--stable to the past.  Her vitamin B12 level is normal– therefore she does not need to resume the vitamin B12 shots. Thanks. Note to self–  9/9/20–cbc cmp tsh lipid A1c ua/ma.  B12 level–-A1c 5

## 2020-09-10 NOTE — TELEPHONE ENCOUNTER
Patient called back. I relayed Dr Heriberto Rodriguez message to her and she verbalized understanding.

## 2020-09-21 ENCOUNTER — NURSE ONLY (OUTPATIENT)
Dept: RHEUMATOLOGY | Facility: CLINIC | Age: 75
End: 2020-09-21
Payer: MEDICARE

## 2020-09-21 DIAGNOSIS — M06.9 RHEUMATOID ARTHRITIS, INVOLVING UNSPECIFIED SITE, UNSPECIFIED RHEUMATOID FACTOR PRESENCE: Primary | ICD-10-CM

## 2020-09-21 PROCEDURE — 96372 THER/PROPH/DIAG INJ SC/IM: CPT | Performed by: INTERNAL MEDICINE

## 2020-09-21 NOTE — PROGRESS NOTES
Name and  verified. Pt aware he was to receive injection of Cimza. Injections given to left and right lower abdomen and pt tolerated well. Possible local side effects discussed with pt.      Pt aware to follow up in 4 weeks for next injection and to foll

## 2020-10-23 ENCOUNTER — NURSE ONLY (OUTPATIENT)
Dept: RHEUMATOLOGY | Facility: CLINIC | Age: 75
End: 2020-10-23
Payer: MEDICARE

## 2020-10-23 ENCOUNTER — TELEPHONE (OUTPATIENT)
Dept: INTERNAL MEDICINE CLINIC | Facility: CLINIC | Age: 75
End: 2020-10-23

## 2020-10-23 DIAGNOSIS — M06.9 RHEUMATOID ARTHRITIS, INVOLVING UNSPECIFIED SITE, UNSPECIFIED WHETHER RHEUMATOID FACTOR PRESENT (HCC): Primary | ICD-10-CM

## 2020-10-23 PROCEDURE — 96372 THER/PROPH/DIAG INJ SC/IM: CPT | Performed by: INTERNAL MEDICINE

## 2020-10-23 NOTE — PROGRESS NOTES
Name and  verified. Pt aware she was to receive injection of Cimza. Injections given to bilateral lower abdomen and pt tolerated well. Possible local side effects discussed with pt.      Pt aware to follow up in 4 weeks for next injection and to follow u

## 2020-10-23 NOTE — TELEPHONE ENCOUNTER
Coming back to [facility] Rheumatologist nurse visit for an injection for Cimzia

## 2020-11-03 ENCOUNTER — TELEPHONE (OUTPATIENT)
Dept: INTERNAL MEDICINE CLINIC | Facility: CLINIC | Age: 75
End: 2020-11-03

## 2020-11-03 RX ORDER — GABAPENTIN 600 MG/1
TABLET ORAL
Qty: 180 TABLET | Refills: 3 | Status: SHIPPED | OUTPATIENT
Start: 2020-11-03 | End: 2021-03-02

## 2020-11-03 NOTE — TELEPHONE ENCOUNTER
Metformin 500 mg twice daily #180 with 3 refills; gabapentin 600 mg twice daily #180 with 3 refills. Sent to  [facility].

## 2020-11-23 ENCOUNTER — NURSE ONLY (OUTPATIENT)
Dept: RHEUMATOLOGY | Facility: CLINIC | Age: 75
End: 2020-11-23
Payer: MEDICARE

## 2020-11-23 ENCOUNTER — TELEPHONE (OUTPATIENT)
Dept: INTERNAL MEDICINE CLINIC | Facility: CLINIC | Age: 75
End: 2020-11-23

## 2020-11-23 DIAGNOSIS — M06.9 RHEUMATOID ARTHRITIS, INVOLVING UNSPECIFIED SITE, UNSPECIFIED WHETHER RHEUMATOID FACTOR PRESENT (HCC): Primary | ICD-10-CM

## 2020-11-23 PROCEDURE — 96372 THER/PROPH/DIAG INJ SC/IM: CPT | Performed by: INTERNAL MEDICINE

## 2020-11-23 NOTE — PROGRESS NOTES
Name and  verified. Pt aware he was to receive injection of Cimza. Injections given to bilateral lower abdomen and pt tolerated well. Possible local side effects discussed with pt.  Pt aware to follow up in 4 weeks for next injection and to follow up

## 2020-12-23 ENCOUNTER — LAB ENCOUNTER (OUTPATIENT)
Dept: LAB | Facility: HOSPITAL | Age: 75
End: 2020-12-23
Attending: INTERNAL MEDICINE
Payer: MEDICARE

## 2020-12-23 ENCOUNTER — NURSE ONLY (OUTPATIENT)
Dept: RHEUMATOLOGY | Facility: CLINIC | Age: 75
End: 2020-12-23
Payer: MEDICARE

## 2020-12-23 DIAGNOSIS — Z51.81 ENCOUNTER FOR THERAPEUTIC DRUG MONITORING: Primary | ICD-10-CM

## 2020-12-23 DIAGNOSIS — M06.9 RHEUMATOID ARTHRITIS, INVOLVING UNSPECIFIED SITE, UNSPECIFIED WHETHER RHEUMATOID FACTOR PRESENT (HCC): ICD-10-CM

## 2020-12-23 PROCEDURE — 85652 RBC SED RATE AUTOMATED: CPT

## 2020-12-23 PROCEDURE — 86140 C-REACTIVE PROTEIN: CPT

## 2020-12-23 PROCEDURE — 84460 ALANINE AMINO (ALT) (SGPT): CPT

## 2020-12-23 PROCEDURE — 84450 TRANSFERASE (AST) (SGOT): CPT

## 2020-12-23 PROCEDURE — 82040 ASSAY OF SERUM ALBUMIN: CPT

## 2020-12-23 PROCEDURE — 96372 THER/PROPH/DIAG INJ SC/IM: CPT | Performed by: INTERNAL MEDICINE

## 2020-12-23 PROCEDURE — 85025 COMPLETE CBC W/AUTO DIFF WBC: CPT

## 2020-12-23 PROCEDURE — 82565 ASSAY OF CREATININE: CPT

## 2020-12-23 PROCEDURE — 36415 COLL VENOUS BLD VENIPUNCTURE: CPT

## 2020-12-23 NOTE — PROGRESS NOTES
Name and  verified. Patient aware she was to receive injection of Cimza. Injections given on Bilateral Lower Abdomen Subquotaneous and patient tolerated well. Possible local side effects discussed with patient.  Patient aware to follow up in 4 weeks for

## 2021-01-07 RX ORDER — LISINOPRIL 2.5 MG/1
2.5 TABLET ORAL DAILY
Qty: 90 TABLET | Refills: 3 | Status: CANCELLED | OUTPATIENT
Start: 2021-01-07

## 2021-01-16 ENCOUNTER — LAB REQUISITION (OUTPATIENT)
Dept: LAB | Facility: HOSPITAL | Age: 76
End: 2021-01-16
Payer: MEDICARE

## 2021-01-16 DIAGNOSIS — Z01.818 ENCOUNTER FOR OTHER PREPROCEDURAL EXAMINATION: ICD-10-CM

## 2021-01-17 LAB — SARS-COV-2 RNA RESP QL NAA+PROBE: NOT DETECTED

## 2021-01-25 ENCOUNTER — OFFICE VISIT (OUTPATIENT)
Dept: RHEUMATOLOGY | Facility: CLINIC | Age: 76
End: 2021-01-25
Payer: COMMERCIAL

## 2021-01-25 ENCOUNTER — TELEPHONE (OUTPATIENT)
Dept: RHEUMATOLOGY | Facility: CLINIC | Age: 76
End: 2021-01-25

## 2021-01-25 VITALS
SYSTOLIC BLOOD PRESSURE: 140 MMHG | DIASTOLIC BLOOD PRESSURE: 79 MMHG | HEIGHT: 57 IN | RESPIRATION RATE: 16 BRPM | BODY MASS INDEX: 23.3 KG/M2 | HEART RATE: 82 BPM | WEIGHT: 108 LBS

## 2021-01-25 DIAGNOSIS — Z51.81 THERAPEUTIC DRUG MONITORING: ICD-10-CM

## 2021-01-25 DIAGNOSIS — M06.9 RHEUMATOID ARTHRITIS, INVOLVING UNSPECIFIED SITE, UNSPECIFIED WHETHER RHEUMATOID FACTOR PRESENT (HCC): Primary | ICD-10-CM

## 2021-01-25 PROCEDURE — 96372 THER/PROPH/DIAG INJ SC/IM: CPT | Performed by: INTERNAL MEDICINE

## 2021-01-25 PROCEDURE — 99214 OFFICE O/P EST MOD 30 MIN: CPT | Performed by: INTERNAL MEDICINE

## 2021-01-25 NOTE — PATIENT INSTRUCTIONS
1. Cont. Sulfasalazine -  1000mg three tiems day    2. Cont.  lelfunomide  20mg a day -    3. Cont.  cimzi 400mg a month.today   4. Return to clinic in 6 months.    5. Labs every 4 months -

## 2021-01-25 NOTE — PROGRESS NOTES
Name and  verified. Patient aware she was to receive injection of Cimza. Injections given on Bilateral Lower Abdomen Subcutaneous and patient tolerated well. Possible local side effects discussed with patient.  Patient aware to follow up in 4 weeks for n

## 2021-01-25 NOTE — PROGRESS NOTES
Hattie Clemons is a 77year old female. HPI:   Patient presents with:  Rheumatoid Arthritis  Medication Follow-Up: Cimzia  Lab Results      I had the pleasure of seeing Hattie Clemons on 1/25/2021.   As you recall she is extremely pleasant 26-year-old who's - she did have some  problesm with the the left foot and toe - had a left foot drop  Sciatica pain is better  The RA is not bad. She feels it's mostly her arm being in a sling that she's not being able to do things.      8/26/2019  She had sciatica pian in and had CABG in 3/2007    • Cataracts, bilateral 2004    OU; sees Dr. 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     • Gallstone Doses into the skin every 30 (thirty) days. • acyclovir 400 MG Oral Tab Take 1 tablet (400 mg total) by mouth 3 (three) times daily. As needed for cold sores 21 tablet 3   • aspirin 325 MG Oral Tab Take 325 mg by mouth daily.      • FreeStyle System ( 0.00 - 0.70 x10(3) uL 0.32   Basophils Absolute      0.00 - 0.20 x10(3) uL 0.07   Immature Granulocyte Absolute      0.00 - 1.00 x10(3) uL 0.02   Neutrophils %      % 49.7   Lymphocytes %      % 32.8   Monocytes %      % 12.0   Eosinophils %      % 4.3   B b/c of her pain being bad   3/2018 - quantiferon gold and hep b and c studies. ,    - cont. h2o aerobics when it reopens   She wants to switch to  Sulfasalazine b/c of insurance coverage - she will switch in march 2017    2.   History of right eye visual lo

## 2021-01-27 RX ORDER — HYDROCHLOROTHIAZIDE 12.5 MG/1
12.5 TABLET ORAL
Qty: 90 TABLET | Refills: 3 | Status: SHIPPED | OUTPATIENT
Start: 2021-01-27 | End: 2022-01-09

## 2021-01-27 RX ORDER — SIMVASTATIN 40 MG
TABLET ORAL
Qty: 90 TABLET | Refills: 3 | Status: SHIPPED | OUTPATIENT
Start: 2021-01-27 | End: 2022-01-09

## 2021-01-27 RX ORDER — LISINOPRIL 2.5 MG/1
2.5 TABLET ORAL DAILY
Qty: 90 TABLET | Refills: 3 | Status: SHIPPED | OUTPATIENT
Start: 2021-01-27 | End: 2022-01-09

## 2021-01-27 NOTE — TELEPHONE ENCOUNTER
Spoke to patient----[facility] is her new pharmacy  Checked with her on hydrochlorothiazide, as she should still have lots left. She states she is almost out of hydrochlorothiazide, would like that refilled too.     Refill request is for a maintenance medicat

## 2021-02-06 DIAGNOSIS — Z23 NEED FOR VACCINATION: ICD-10-CM

## 2021-02-08 ENCOUNTER — IMMUNIZATION (OUTPATIENT)
Dept: LAB | Age: 76
End: 2021-02-08
Attending: HOSPITALIST
Payer: MEDICARE

## 2021-02-08 DIAGNOSIS — Z23 NEED FOR VACCINATION: Primary | ICD-10-CM

## 2021-02-08 PROCEDURE — 0001A SARSCOV2 VAC 30MCG/0.3ML IM: CPT

## 2021-02-24 ENCOUNTER — TELEPHONE (OUTPATIENT)
Dept: RHEUMATOLOGY | Facility: CLINIC | Age: 76
End: 2021-02-24

## 2021-02-24 ENCOUNTER — NURSE ONLY (OUTPATIENT)
Dept: RHEUMATOLOGY | Facility: CLINIC | Age: 76
End: 2021-02-24
Payer: COMMERCIAL

## 2021-02-24 DIAGNOSIS — M06.9 RHEUMATOID ARTHRITIS, INVOLVING UNSPECIFIED SITE, UNSPECIFIED WHETHER RHEUMATOID FACTOR PRESENT (HCC): Primary | ICD-10-CM

## 2021-02-24 PROCEDURE — 96372 THER/PROPH/DIAG INJ SC/IM: CPT | Performed by: INTERNAL MEDICINE

## 2021-02-24 NOTE — PROGRESS NOTES
Name and  verified. Pt aware he was to receive injection of Cimza. Injections given to bilateral lower abdomen and pt tolerated well. Pt aware to follow up in 1 month.

## 2021-03-01 ENCOUNTER — IMMUNIZATION (OUTPATIENT)
Dept: LAB | Age: 76
End: 2021-03-01
Attending: HOSPITALIST
Payer: MEDICARE

## 2021-03-01 DIAGNOSIS — Z23 NEED FOR VACCINATION: Primary | ICD-10-CM

## 2021-03-01 PROCEDURE — 0002A SARSCOV2 VAC 30MCG/0.3ML IM: CPT

## 2021-03-02 ENCOUNTER — TELEPHONE (OUTPATIENT)
Dept: INTERNAL MEDICINE CLINIC | Facility: CLINIC | Age: 76
End: 2021-03-02

## 2021-03-02 RX ORDER — METOPROLOL TARTRATE 50 MG/1
50 TABLET, FILM COATED ORAL 2 TIMES DAILY
Qty: 180 TABLET | Refills: 3 | Status: SHIPPED | OUTPATIENT
Start: 2021-03-02 | End: 2022-04-26

## 2021-03-02 RX ORDER — GABAPENTIN 600 MG/1
600 TABLET ORAL 2 TIMES DAILY
Qty: 180 TABLET | Refills: 3 | Status: SHIPPED | OUTPATIENT
Start: 2021-03-02 | End: 2022-03-11

## 2021-03-02 NOTE — TELEPHONE ENCOUNTER
Patient called and scheduled a 6 month follow up on 3/5  Patient is asking for the refills to be sent to the pharmacy

## 2021-03-04 ENCOUNTER — LAB ENCOUNTER (OUTPATIENT)
Dept: LAB | Age: 76
End: 2021-03-04
Attending: INTERNAL MEDICINE
Payer: MEDICARE

## 2021-03-04 DIAGNOSIS — Z51.81 THERAPEUTIC DRUG MONITORING: ICD-10-CM

## 2021-03-04 DIAGNOSIS — M06.9 RHEUMATOID ARTHRITIS, INVOLVING UNSPECIFIED SITE, UNSPECIFIED WHETHER RHEUMATOID FACTOR PRESENT (HCC): ICD-10-CM

## 2021-03-04 LAB
ALT SERPL-CCNC: 20 U/L
AST SERPL-CCNC: 18 U/L (ref 15–37)
CREAT BLD-MCNC: 0.96 MG/DL
CRP SERPL-MCNC: 0.39 MG/DL (ref ?–0.3)
DEPRECATED RDW RBC AUTO: 46.5 FL (ref 35.1–46.3)
ERYTHROCYTE [DISTWIDTH] IN BLOOD BY AUTOMATED COUNT: 13.1 % (ref 11–15)
ERYTHROCYTE [SEDIMENTATION RATE] IN BLOOD: 26 MM/HR
HCT VFR BLD AUTO: 33.2 %
HGB BLD-MCNC: 10.4 G/DL
MCH RBC QN AUTO: 30.2 PG (ref 26–34)
MCHC RBC AUTO-ENTMCNC: 31.3 G/DL (ref 31–37)
MCV RBC AUTO: 96.5 FL
PLATELET # BLD AUTO: 268 10(3)UL (ref 150–450)
RBC # BLD AUTO: 3.44 X10(6)UL
WBC # BLD AUTO: 6.1 X10(3) UL (ref 4–11)

## 2021-03-04 PROCEDURE — 86480 TB TEST CELL IMMUN MEASURE: CPT

## 2021-03-04 PROCEDURE — 84450 TRANSFERASE (AST) (SGOT): CPT

## 2021-03-04 PROCEDURE — 84460 ALANINE AMINO (ALT) (SGPT): CPT

## 2021-03-04 PROCEDURE — 82565 ASSAY OF CREATININE: CPT

## 2021-03-04 PROCEDURE — 85652 RBC SED RATE AUTOMATED: CPT

## 2021-03-04 PROCEDURE — 86140 C-REACTIVE PROTEIN: CPT

## 2021-03-04 PROCEDURE — 36415 COLL VENOUS BLD VENIPUNCTURE: CPT

## 2021-03-04 PROCEDURE — 85027 COMPLETE CBC AUTOMATED: CPT

## 2021-03-05 ENCOUNTER — OFFICE VISIT (OUTPATIENT)
Dept: INTERNAL MEDICINE CLINIC | Facility: CLINIC | Age: 76
End: 2021-03-05
Payer: COMMERCIAL

## 2021-03-05 VITALS
HEIGHT: 57 IN | BODY MASS INDEX: 23.51 KG/M2 | TEMPERATURE: 98 F | HEART RATE: 84 BPM | WEIGHT: 109 LBS | DIASTOLIC BLOOD PRESSURE: 68 MMHG | SYSTOLIC BLOOD PRESSURE: 130 MMHG

## 2021-03-05 DIAGNOSIS — Z95.1 S/P CABG X 3: ICD-10-CM

## 2021-03-05 DIAGNOSIS — I10 ESSENTIAL HYPERTENSION: ICD-10-CM

## 2021-03-05 DIAGNOSIS — E11.9 DIABETES MELLITUS TYPE 2 WITHOUT RETINOPATHY (HCC): ICD-10-CM

## 2021-03-05 DIAGNOSIS — D64.9 CHRONIC ANEMIA: ICD-10-CM

## 2021-03-05 DIAGNOSIS — R21 RASH: Primary | ICD-10-CM

## 2021-03-05 DIAGNOSIS — M06.00 RHEUMATOID ARTHRITIS WITH NEGATIVE RHEUMATOID FACTOR, INVOLVING UNSPECIFIED SITE (HCC): ICD-10-CM

## 2021-03-05 DIAGNOSIS — M47.816 ARTHRITIS, LUMBAR SPINE: ICD-10-CM

## 2021-03-05 DIAGNOSIS — Z98.890 HISTORY OF LUMBAR DISCECTOMY: ICD-10-CM

## 2021-03-05 DIAGNOSIS — E78.2 HYPERLIPIDEMIA, MIXED: ICD-10-CM

## 2021-03-05 LAB
CARTRIDGE LOT#: NORMAL NUMERIC
HEMOGLOBIN A1C: 5.4 % (ref 4.3–5.6)

## 2021-03-05 PROCEDURE — 3044F HG A1C LEVEL LT 7.0%: CPT | Performed by: INTERNAL MEDICINE

## 2021-03-05 PROCEDURE — 36416 COLLJ CAPILLARY BLOOD SPEC: CPT | Performed by: INTERNAL MEDICINE

## 2021-03-05 PROCEDURE — 3008F BODY MASS INDEX DOCD: CPT | Performed by: INTERNAL MEDICINE

## 2021-03-05 PROCEDURE — 83036 HEMOGLOBIN GLYCOSYLATED A1C: CPT | Performed by: INTERNAL MEDICINE

## 2021-03-05 PROCEDURE — 3075F SYST BP GE 130 - 139MM HG: CPT | Performed by: INTERNAL MEDICINE

## 2021-03-05 PROCEDURE — 3078F DIAST BP <80 MM HG: CPT | Performed by: INTERNAL MEDICINE

## 2021-03-05 PROCEDURE — 99214 OFFICE O/P EST MOD 30 MIN: CPT | Performed by: INTERNAL MEDICINE

## 2021-03-05 NOTE — PROGRESS NOTES
Roman Mcarthur is a 77year old female who presents for     Check at 6 months     Feels good. \"I think I might have a yeast infection again in my crack. \" Gluteal crease. Also has \"dry skin\" spot on back of R leg. For 2 wks.      Stable L low back level low at 126 on 7/28/17. Past Surgical History:   Procedure Laterality Date   • ANGIOPLASTY (CORONARY)  2007   • APPENDECTOMY  1974   • BACK SURGERY  04/09/2019     Re-exploration and resection herniated intervertebral disc, L4-L5 for recurrence. cough, wheezing or dyspnea  Cardiac: No chest pain or palpitations  Gastrointestinal: No abdominal pain, vomiting, diarrhea or constipation  Genitourinary:  No dysuria or blood in the urine         EXAM:   /68   Pulse 84   Temp 97.7 °F (36.5 °C) (Ora arthritis--Doing ok--does stretching exercises. 2/18/20–x-ray lumbar –dextroscoliosis, multilevel lumbar spondylosis, incidental gallstone. Hx Lumbar discectomy   Laminectomy 2/26/19- excision herniated disc L4-5 - Dr Doreen Najera.    Re-exploration 4/9/1 3   • metFORMIN HCl 500 MG Oral Tab Take 1 tablet (500 mg total) by mouth 2 (two) times daily with meals. 180 tablet 3   • lisinopril 2.5 MG Oral Tab Take 1 tablet (2.5 mg total) by mouth daily.  90 tablet 3   • simvastatin 40 MG Oral Tab TAKE 1 TABLET EVER

## 2021-03-06 LAB
M TB IFN-G CD4+ T-CELLS BLD-ACNC: 0.06 IU/ML
M TB TUBERC IFN-G BLD QL: NEGATIVE
M TB TUBERC IGNF/MITOGEN IGNF CONTROL: 8.02 IU/ML
QFT TB1 AG MINUS NIL: 0 IU/ML
QFT TB2 AG MINUS NIL: 0.02 IU/ML

## 2021-03-15 ENCOUNTER — TELEPHONE (OUTPATIENT)
Dept: RHEUMATOLOGY | Facility: CLINIC | Age: 76
End: 2021-03-15

## 2021-03-26 ENCOUNTER — NURSE ONLY (OUTPATIENT)
Dept: RHEUMATOLOGY | Facility: CLINIC | Age: 76
End: 2021-03-26
Payer: COMMERCIAL

## 2021-03-26 DIAGNOSIS — M06.9 RHEUMATOID ARTHRITIS, INVOLVING UNSPECIFIED SITE, UNSPECIFIED WHETHER RHEUMATOID FACTOR PRESENT (HCC): Primary | ICD-10-CM

## 2021-03-26 PROCEDURE — 96372 THER/PROPH/DIAG INJ SC/IM: CPT | Performed by: INTERNAL MEDICINE

## 2021-04-26 ENCOUNTER — TELEPHONE (OUTPATIENT)
Dept: RHEUMATOLOGY | Facility: CLINIC | Age: 76
End: 2021-04-26

## 2021-04-26 ENCOUNTER — NURSE ONLY (OUTPATIENT)
Dept: RHEUMATOLOGY | Facility: CLINIC | Age: 76
End: 2021-04-26
Payer: COMMERCIAL

## 2021-04-26 DIAGNOSIS — M06.9 RHEUMATOID ARTHRITIS, INVOLVING UNSPECIFIED SITE, UNSPECIFIED WHETHER RHEUMATOID FACTOR PRESENT (HCC): ICD-10-CM

## 2021-04-26 PROCEDURE — 96372 THER/PROPH/DIAG INJ SC/IM: CPT | Performed by: INTERNAL MEDICINE

## 2021-04-26 NOTE — PROGRESS NOTES
Patient came to nurse visit for her Cimzia injection. Patient name and  verified. Patient received two 200mg injection on bilateral lower abdomen. She tolerated very well.

## 2021-04-27 ENCOUNTER — OFFICE VISIT (OUTPATIENT)
Dept: DERMATOLOGY CLINIC | Facility: CLINIC | Age: 76
End: 2021-04-27
Payer: COMMERCIAL

## 2021-04-27 DIAGNOSIS — D48.5 NEOPLASM OF UNCERTAIN BEHAVIOR OF SKIN: Primary | ICD-10-CM

## 2021-04-27 PROCEDURE — 88305 TISSUE EXAM BY PATHOLOGIST: CPT | Performed by: DERMATOLOGY

## 2021-04-27 PROCEDURE — 99202 OFFICE O/P NEW SF 15 MIN: CPT | Performed by: DERMATOLOGY

## 2021-04-27 PROCEDURE — 11102 TANGNTL BX SKIN SINGLE LES: CPT | Performed by: DERMATOLOGY

## 2021-04-27 NOTE — PROGRESS NOTES
HPI:     Chief Complaint     Derm Problem        HPI     Derm Problem      Additional comments: New pt. pt presenting today with rash on Lower back of R leg since Jan. of this year. c/o itching.  Denies changes in soap. pt has been using Cortizone 10 with s TIMES A   DAY WITH MEALS 540 tablet 1   • LEFLUNOMIDE 20 MG Oral Tab TAKE 1 TABLET DAILY 90 tablet 1   • aspirin 325 MG Oral Tab Take 325 mg by mouth daily.      • FreeStyle System (FREESTYLE) Does not apply Kit      • Calcium Carb-Cholecalciferol (CALCIUM BACK SURGERY  04/09/2019     Re-exploration and resection herniated intervertebral disc, L4-L5 for recurrence.  Dr. Megan Cagle   • [address], [facility]    x3   • CABG  2007    Dr Ken Thakkar   • CATARACT Left 01/19/2021   • COLONOSCOPY  08/29/2017    hemo  x 40 yrs; mother of 3 adult children. She is no longer worker; formerly worked in [employer] at [email or web address]. She lives in [place] with her spouse, Peter Schmidt, and her two adult sons.      Social Determinants of Health  Financial Resource Strain:       Difficulty diagnosis)-could be an inflammatory lesion versus something like squamous cell carcinoma in situ. This was discussed with patient. Need for biopsy discussed. Shave biopsy is performed- See the operative note. Postop instructions given.   Further plan pe

## 2021-05-25 RX ORDER — LEFLUNOMIDE 20 MG/1
20 TABLET ORAL DAILY
Qty: 90 TABLET | Refills: 1 | Status: SHIPPED | OUTPATIENT
Start: 2021-05-25 | End: 2021-09-22

## 2021-05-25 NOTE — TELEPHONE ENCOUNTER
Last filled: 8/17/20 #90 tab with 1 refill  LOV: 1/25/21  Future Appointments   Date Time Provider William Vergara   5/26/2021 11:00 AM [facility] RN RHEUMATOLOGY [facility]   10/25/2021 11:45 AM Chano Grimes MD [facility] [place] [place]     Labs: 3/4/

## 2021-05-26 ENCOUNTER — NURSE ONLY (OUTPATIENT)
Dept: RHEUMATOLOGY | Facility: CLINIC | Age: 76
End: 2021-05-26
Payer: COMMERCIAL

## 2021-05-26 DIAGNOSIS — M06.9 RHEUMATOID ARTHRITIS, INVOLVING UNSPECIFIED SITE, UNSPECIFIED WHETHER RHEUMATOID FACTOR PRESENT (HCC): Primary | ICD-10-CM

## 2021-05-26 PROCEDURE — 96372 THER/PROPH/DIAG INJ SC/IM: CPT | Performed by: INTERNAL MEDICINE

## 2021-05-26 NOTE — PROGRESS NOTES
Patient presented to clinic for Cimzia injections. Name and  verified. Cimzia given to bilateral lower abdomen. Patient tolerated well. She is aware to return to clinic in 1 month for next injection.

## 2021-06-23 ENCOUNTER — NURSE ONLY (OUTPATIENT)
Dept: RHEUMATOLOGY | Facility: CLINIC | Age: 76
End: 2021-06-23
Payer: COMMERCIAL

## 2021-06-23 DIAGNOSIS — M06.9 RHEUMATOID ARTHRITIS, INVOLVING UNSPECIFIED SITE, UNSPECIFIED WHETHER RHEUMATOID FACTOR PRESENT (HCC): Primary | ICD-10-CM

## 2021-06-23 PROCEDURE — 96372 THER/PROPH/DIAG INJ SC/IM: CPT | Performed by: INTERNAL MEDICINE

## 2021-06-23 NOTE — PROGRESS NOTES
Name and  verified. Pt aware she is to receive injection of Cimza. Injections given and pt tolerated well. Pt aware to follow up in 4 weeks for next injection and to follow up with provider after labs completed.

## 2021-06-30 ENCOUNTER — PATIENT MESSAGE (OUTPATIENT)
Dept: INTERNAL MEDICINE CLINIC | Facility: CLINIC | Age: 76
End: 2021-06-30

## 2021-06-30 DIAGNOSIS — Z12.31 ENCOUNTER FOR SCREENING MAMMOGRAM FOR BREAST CANCER: Primary | ICD-10-CM

## 2021-06-30 DIAGNOSIS — Z78.0 UNCOMPLICATED MENOPAUSE: ICD-10-CM

## 2021-06-30 NOTE — TELEPHONE ENCOUNTER
From: Danis Holguin  To: Javier Smith MD  Sent: 6/30/2021 4:19 PM CDT  Subject: Non-Urgent Medical Question    I’m due for a mammogram. Should I get a dexa scan at the same time?  It’s been 2 years since my last bone density scan

## 2021-07-21 ENCOUNTER — LAB ENCOUNTER (OUTPATIENT)
Dept: LAB | Facility: HOSPITAL | Age: 76
End: 2021-07-21
Attending: INTERNAL MEDICINE
Payer: MEDICARE

## 2021-07-21 ENCOUNTER — OFFICE VISIT (OUTPATIENT)
Dept: RHEUMATOLOGY | Facility: CLINIC | Age: 76
End: 2021-07-21
Payer: COMMERCIAL

## 2021-07-21 VITALS
BODY MASS INDEX: 23.95 KG/M2 | WEIGHT: 111 LBS | HEIGHT: 57 IN | SYSTOLIC BLOOD PRESSURE: 138 MMHG | HEART RATE: 61 BPM | DIASTOLIC BLOOD PRESSURE: 70 MMHG

## 2021-07-21 DIAGNOSIS — Z51.81 THERAPEUTIC DRUG MONITORING: ICD-10-CM

## 2021-07-21 DIAGNOSIS — M06.9 RHEUMATOID ARTHRITIS, INVOLVING UNSPECIFIED SITE, UNSPECIFIED WHETHER RHEUMATOID FACTOR PRESENT (HCC): ICD-10-CM

## 2021-07-21 DIAGNOSIS — M06.9 RHEUMATOID ARTHRITIS, INVOLVING UNSPECIFIED SITE, UNSPECIFIED WHETHER RHEUMATOID FACTOR PRESENT (HCC): Primary | ICD-10-CM

## 2021-07-21 LAB
ALT SERPL-CCNC: 21 U/L
AST SERPL-CCNC: 16 U/L (ref 15–37)
CREAT BLD-MCNC: 0.92 MG/DL
CRP SERPL-MCNC: <0.29 MG/DL (ref ?–0.3)
DEPRECATED RDW RBC AUTO: 47.3 FL (ref 35.1–46.3)
ERYTHROCYTE [DISTWIDTH] IN BLOOD BY AUTOMATED COUNT: 13.1 % (ref 11–15)
ERYTHROCYTE [SEDIMENTATION RATE] IN BLOOD: 18 MM/HR
HCT VFR BLD AUTO: 34 %
HGB BLD-MCNC: 10.4 G/DL
MCH RBC QN AUTO: 30.4 PG (ref 26–34)
MCHC RBC AUTO-ENTMCNC: 30.6 G/DL (ref 31–37)
MCV RBC AUTO: 99.4 FL
PLATELET # BLD AUTO: 244 10(3)UL (ref 150–450)
RBC # BLD AUTO: 3.42 X10(6)UL
WBC # BLD AUTO: 7.4 X10(3) UL (ref 4–11)

## 2021-07-21 PROCEDURE — 36415 COLL VENOUS BLD VENIPUNCTURE: CPT

## 2021-07-21 PROCEDURE — 84460 ALANINE AMINO (ALT) (SGPT): CPT

## 2021-07-21 PROCEDURE — 85027 COMPLETE CBC AUTOMATED: CPT

## 2021-07-21 PROCEDURE — 86140 C-REACTIVE PROTEIN: CPT

## 2021-07-21 PROCEDURE — 3075F SYST BP GE 130 - 139MM HG: CPT | Performed by: INTERNAL MEDICINE

## 2021-07-21 PROCEDURE — 85652 RBC SED RATE AUTOMATED: CPT

## 2021-07-21 PROCEDURE — 3008F BODY MASS INDEX DOCD: CPT | Performed by: INTERNAL MEDICINE

## 2021-07-21 PROCEDURE — 82565 ASSAY OF CREATININE: CPT

## 2021-07-21 PROCEDURE — 96372 THER/PROPH/DIAG INJ SC/IM: CPT | Performed by: INTERNAL MEDICINE

## 2021-07-21 PROCEDURE — 99214 OFFICE O/P EST MOD 30 MIN: CPT | Performed by: INTERNAL MEDICINE

## 2021-07-21 PROCEDURE — 84450 TRANSFERASE (AST) (SGOT): CPT

## 2021-07-21 PROCEDURE — 3078F DIAST BP <80 MM HG: CPT | Performed by: INTERNAL MEDICINE

## 2021-07-21 NOTE — PATIENT INSTRUCTIONS
Summary:  1. Cont. Sulfasalazine -  1000mg three tiems day    2. Cont.  lelfunomide  20mg a day -    3. Cont.  cimzi 400mg a month.today   4. Return to clinic in 6 months. 5. Labs today and then in 6 months.

## 2021-07-21 NOTE — PROGRESS NOTES
Patient due for Cimzia injection. Cimzia 400mg injected into bilateral lower abdomen. Patient tolerated injections well. Patient understands to return to clinic in 4 weeks for next injection.

## 2021-07-21 NOTE — PROGRESS NOTES
Joyce Vaughan is a 77year old female. HPI:   Patient presents with: Follow - Up: RA follow up, feeling well, Lizy      I had the pleasure of seeing Shannan Santiago .    As you recall she is extremely pleasant 78-year-old who's a history of seronegative problesm with the the left foot and toe - had a left foot drop  Sciatica pain is better  The RA is not bad. She feels it's mostly her arm being in a sling that she's not being able to do things.      8/26/2019  She had sciatica pian in 1/2019 0 and has had ok now. Her family is ok. No side effects with covid vaccine. She is forgetting  To take afternoon ssz - and she is takign 1000mg bid really.          HISTORY:  Past Medical History:   Diagnosis Date   • Amblyopia 1951    R eye vision impaired since c (2.5 mg total) by mouth daily. 90 tablet 3   • simvastatin 40 MG Oral Tab TAKE 1 TABLET EVERY NIGHT 90 tablet 3   • hydrochlorothiazide 12.5 MG Oral Tab Take 1 tablet (12.5 mg total) by mouth once daily.  90 tablet 3   • sulfaSALAzine 500 MG Oral Tab TAKE 2 35.0 - 48.0 %  33.2 (L)   MCV      80.0 - 100.0 fL  96.5   MCH      26.0 - 34.0 pg  30.2   MCHC      31.0 - 37.0 g/dL  31.3   RDW      11.0 - 15.0 %  13.1   RDW-SD      35.1 - 46.3 fL  46.5 (H)   Platelet Count      195.8 - 450.0 10(3)uL  268.0   Quantif leflunomide 10mg a day on 3/2016 - from methotrexate 15mg a week   - stopped leflunomide in beginning of 3/2017 - b/c of expense but went back on b/c of her pain being bad   3/2018 - quantiferon gold and hep b and c studies.  ,    - cont. h2o aerobics when

## 2021-08-02 ENCOUNTER — HOSPITAL ENCOUNTER (OUTPATIENT)
Dept: MAMMOGRAPHY | Facility: HOSPITAL | Age: 76
Discharge: HOME | End: 2021-08-02
Attending: INTERNAL MEDICINE
Payer: MEDICARE

## 2021-08-02 DIAGNOSIS — Z12.31 ENCOUNTER FOR SCREENING MAMMOGRAM FOR BREAST CANCER: ICD-10-CM

## 2021-08-02 PROCEDURE — 77067 SCR MAMMO BI INCL CAD: CPT | Performed by: INTERNAL MEDICINE

## 2021-08-02 PROCEDURE — 77063 BREAST TOMOSYNTHESIS BI: CPT | Performed by: INTERNAL MEDICINE

## 2021-08-07 ENCOUNTER — PATIENT MESSAGE (OUTPATIENT)
Dept: RHEUMATOLOGY | Facility: CLINIC | Age: 76
End: 2021-08-07

## 2021-08-09 ENCOUNTER — TELEPHONE (OUTPATIENT)
Dept: RHEUMATOLOGY | Facility: CLINIC | Age: 76
End: 2021-08-09

## 2021-08-09 RX ORDER — SULFASALAZINE 500 MG/1
1000 TABLET ORAL 2 TIMES DAILY
Qty: 360 TABLET | Refills: 1 | Status: SHIPPED | OUTPATIENT
Start: 2021-08-09 | End: 2021-08-10

## 2021-08-09 NOTE — TELEPHONE ENCOUNTER
Spoke to patient and confirmed. She was taking TID. She verbalized that you talked to her about decreasing the dose and she is ok with taking 1000mg BID. Correct script pended.

## 2021-08-09 NOTE — TELEPHONE ENCOUNTER
LOV: 7/21/21  No f/u  LABS:  Component      Latest Ref Rng & Units 7/21/2021   WBC      4.0 - 11.0 x10(3) uL 7.4   RBC      3.80 - 5.30 x10(6)uL 3.42 (L)   Hemoglobin      12.0 - 16.0 g/dL 10.4 (L)   Hematocrit      35.0 - 48.0 % 34.0 (L)   MCV      80.0 -

## 2021-08-09 NOTE — TELEPHONE ENCOUNTER
From: May Cabrera  To: Camryn Streeter MD  Sent: 8/7/2021 4:00 PM CDT  Subject: Prescription Question    Hi Doctor,    I just requested a prescription refill for Sulfasalazine 500 mg 3x/day.  The auto refill is for [facility], but it SHOULD BE for

## 2021-08-10 NOTE — TELEPHONE ENCOUNTER
Patient stated she spoke with [facility] and they are out of medication until 8/20.  Patient is requesting a [facility] month [facility] [place].

## 2021-08-10 NOTE — TELEPHONE ENCOUNTER
Patient is requesting a month supply at different pharmacy due to [facility] being out of it till next month.

## 2021-08-11 RX ORDER — SULFASALAZINE 500 MG/1
1000 TABLET ORAL 2 TIMES DAILY
Qty: 120 TABLET | Refills: 0 | Status: SHIPPED | OUTPATIENT
Start: 2021-08-11 | End: 2021-08-26

## 2021-08-20 ENCOUNTER — NURSE ONLY (OUTPATIENT)
Dept: RHEUMATOLOGY | Facility: CLINIC | Age: 76
End: 2021-08-20
Payer: COMMERCIAL

## 2021-08-20 DIAGNOSIS — M06.00 RHEUMATOID ARTHRITIS WITH NEGATIVE RHEUMATOID FACTOR, INVOLVING UNSPECIFIED SITE (HCC): Primary | ICD-10-CM

## 2021-08-20 PROCEDURE — 96372 THER/PROPH/DIAG INJ SC/IM: CPT | Performed by: INTERNAL MEDICINE

## 2021-08-20 NOTE — PROGRESS NOTES
Patient came in for a nurse visit to receive Cimzia injection. Patient verified name and  and was aware of receiving Cimzia injection. Patient received two Cimzia injection on bilateral lower abdomen. She tolerated well.  She was informed to schedule nex

## 2021-08-26 ENCOUNTER — PATIENT MESSAGE (OUTPATIENT)
Dept: RHEUMATOLOGY | Facility: CLINIC | Age: 76
End: 2021-08-26

## 2021-08-27 RX ORDER — SULFASALAZINE 500 MG/1
1000 TABLET ORAL 2 TIMES DAILY
Qty: 360 TABLET | Refills: 1 | Status: SHIPPED | OUTPATIENT
Start: 2021-08-27 | End: 2021-09-26

## 2021-09-02 RX ORDER — SULFASALAZINE 500 MG/1
TABLET ORAL
Qty: 120 TABLET | Refills: 0 | OUTPATIENT
Start: 2021-09-02

## 2021-09-05 ENCOUNTER — IMMUNIZATION (OUTPATIENT)
Dept: LAB | Facility: HOSPITAL | Age: 76
End: 2021-09-05
Attending: EMERGENCY MEDICINE
Payer: MEDICARE

## 2021-09-05 DIAGNOSIS — Z23 NEED FOR VACCINATION: Primary | ICD-10-CM

## 2021-09-05 PROCEDURE — 0003A SARSCOV2 VAC 30MCG/0.3ML IM: CPT

## 2021-09-17 ENCOUNTER — NURSE ONLY (OUTPATIENT)
Dept: RHEUMATOLOGY | Facility: CLINIC | Age: 76
End: 2021-09-17
Payer: COMMERCIAL

## 2021-09-17 DIAGNOSIS — M06.9 RHEUMATOID ARTHRITIS, INVOLVING UNSPECIFIED SITE, UNSPECIFIED WHETHER RHEUMATOID FACTOR PRESENT (HCC): Primary | ICD-10-CM

## 2021-09-17 PROCEDURE — 96372 THER/PROPH/DIAG INJ SC/IM: CPT | Performed by: INTERNAL MEDICINE

## 2021-09-20 ENCOUNTER — TELEPHONE (OUTPATIENT)
Dept: INTERNAL MEDICINE CLINIC | Facility: CLINIC | Age: 76
End: 2021-09-20

## 2021-09-20 ENCOUNTER — LAB ENCOUNTER (OUTPATIENT)
Dept: LAB | Age: 76
End: 2021-09-20
Attending: INTERNAL MEDICINE
Payer: MEDICARE

## 2021-09-20 ENCOUNTER — OFFICE VISIT (OUTPATIENT)
Dept: INTERNAL MEDICINE CLINIC | Facility: CLINIC | Age: 76
End: 2021-09-20
Payer: COMMERCIAL

## 2021-09-20 VITALS
SYSTOLIC BLOOD PRESSURE: 128 MMHG | HEART RATE: 80 BPM | OXYGEN SATURATION: 95 % | HEIGHT: 57 IN | DIASTOLIC BLOOD PRESSURE: 58 MMHG | TEMPERATURE: 98 F | BODY MASS INDEX: 23.51 KG/M2 | WEIGHT: 109 LBS

## 2021-09-20 DIAGNOSIS — Z98.890 HISTORY OF LUMBAR DISCECTOMY: ICD-10-CM

## 2021-09-20 DIAGNOSIS — M21.372 LEFT FOOT DROP: ICD-10-CM

## 2021-09-20 DIAGNOSIS — B00.1 RECURRENT COLD SORES: ICD-10-CM

## 2021-09-20 DIAGNOSIS — Z91.81 AT RISK FOR FALLS: ICD-10-CM

## 2021-09-20 DIAGNOSIS — D64.9 CHRONIC ANEMIA: ICD-10-CM

## 2021-09-20 DIAGNOSIS — Z95.1 S/P CABG X 3: ICD-10-CM

## 2021-09-20 DIAGNOSIS — Z00.00 MEDICARE ANNUAL WELLNESS VISIT, SUBSEQUENT: Primary | ICD-10-CM

## 2021-09-20 DIAGNOSIS — M47.816 ARTHRITIS, LUMBAR SPINE: ICD-10-CM

## 2021-09-20 DIAGNOSIS — E11.9 DIABETES MELLITUS TYPE 2 WITHOUT RETINOPATHY (HCC): ICD-10-CM

## 2021-09-20 DIAGNOSIS — E78.2 HYPERLIPIDEMIA, MIXED: ICD-10-CM

## 2021-09-20 DIAGNOSIS — M06.00 RHEUMATOID ARTHRITIS WITH NEGATIVE RHEUMATOID FACTOR, INVOLVING UNSPECIFIED SITE (HCC): ICD-10-CM

## 2021-09-20 DIAGNOSIS — I10 ESSENTIAL HYPERTENSION: ICD-10-CM

## 2021-09-20 DIAGNOSIS — I25.10 CAD IN NATIVE ARTERY: ICD-10-CM

## 2021-09-20 DIAGNOSIS — Z23 NEED FOR VACCINATION: ICD-10-CM

## 2021-09-20 LAB
ALBUMIN SERPL-MCNC: 3.5 G/DL (ref 3.4–5)
ALBUMIN/GLOB SERPL: 0.9 {RATIO} (ref 1–2)
ALP LIVER SERPL-CCNC: 53 U/L
ALT SERPL-CCNC: 20 U/L
ANION GAP SERPL CALC-SCNC: 6 MMOL/L (ref 0–18)
AST SERPL-CCNC: 22 U/L (ref 15–37)
BILIRUB SERPL-MCNC: 0.3 MG/DL (ref 0.1–2)
BILIRUB UR QL: NEGATIVE
BUN BLD-MCNC: 19 MG/DL (ref 7–18)
BUN/CREAT SERPL: 19.6 (ref 10–20)
CALCIUM BLD-MCNC: 9.4 MG/DL (ref 8.5–10.1)
CHLORIDE SERPL-SCNC: 107 MMOL/L (ref 98–112)
CHOLEST SERPL-MCNC: 171 MG/DL (ref ?–200)
CLARITY UR: CLEAR
CO2 SERPL-SCNC: 28 MMOL/L (ref 21–32)
COLOR UR: YELLOW
CREAT BLD-MCNC: 0.97 MG/DL
CREAT UR-SCNC: 199 MG/DL
EST. AVERAGE GLUCOSE BLD GHB EST-MCNC: 111 MG/DL (ref 68–126)
GLOBULIN PLAS-MCNC: 3.7 G/DL (ref 2.8–4.4)
GLUCOSE BLD-MCNC: 105 MG/DL (ref 70–99)
GLUCOSE UR-MCNC: NEGATIVE MG/DL
HBA1C MFR BLD HPLC: 5.5 % (ref ?–5.7)
HDLC SERPL-MCNC: 63 MG/DL (ref 40–59)
HGB UR QL STRIP.AUTO: NEGATIVE
KETONES UR-MCNC: NEGATIVE MG/DL
LDLC SERPL CALC-MCNC: 79 MG/DL (ref ?–100)
LEUKOCYTE ESTERASE UR QL STRIP.AUTO: NEGATIVE
MICROALBUMIN UR-MCNC: 6.56 MG/DL
MICROALBUMIN/CREAT 24H UR-RTO: 33 UG/MG (ref ?–30)
NITRITE UR QL STRIP.AUTO: NEGATIVE
NONHDLC SERPL-MCNC: 108 MG/DL (ref ?–130)
OSMOLALITY SERPL CALC.SUM OF ELEC: 295 MOSM/KG (ref 275–295)
PATIENT FASTING Y/N/NP: YES
PATIENT FASTING Y/N/NP: YES
PH UR: 5 [PH] (ref 5–8)
POTASSIUM SERPL-SCNC: 4.4 MMOL/L (ref 3.5–5.1)
PROT SERPL-MCNC: 7.2 G/DL (ref 6.4–8.2)
PROT UR-MCNC: 30 MG/DL
SODIUM SERPL-SCNC: 141 MMOL/L (ref 136–145)
SP GR UR STRIP: 1.03 (ref 1–1.03)
TRIGL SERPL-MCNC: 170 MG/DL (ref 30–149)
TSI SER-ACNC: 2.64 MIU/ML (ref 0.36–3.74)
UROBILINOGEN UR STRIP-ACNC: <2
VIT B12 SERPL-MCNC: 450 PG/ML (ref 193–986)
VLDLC SERPL CALC-MCNC: 27 MG/DL (ref 0–30)

## 2021-09-20 PROCEDURE — 3078F DIAST BP <80 MM HG: CPT | Performed by: INTERNAL MEDICINE

## 2021-09-20 PROCEDURE — 90662 IIV NO PRSV INCREASED AG IM: CPT | Performed by: INTERNAL MEDICINE

## 2021-09-20 PROCEDURE — 84443 ASSAY THYROID STIM HORMONE: CPT

## 2021-09-20 PROCEDURE — 82607 VITAMIN B-12: CPT

## 2021-09-20 PROCEDURE — 80053 COMPREHEN METABOLIC PANEL: CPT

## 2021-09-20 PROCEDURE — 83036 HEMOGLOBIN GLYCOSYLATED A1C: CPT

## 2021-09-20 PROCEDURE — 3074F SYST BP LT 130 MM HG: CPT | Performed by: INTERNAL MEDICINE

## 2021-09-20 PROCEDURE — 36415 COLL VENOUS BLD VENIPUNCTURE: CPT

## 2021-09-20 PROCEDURE — G0008 ADMIN INFLUENZA VIRUS VAC: HCPCS | Performed by: INTERNAL MEDICINE

## 2021-09-20 PROCEDURE — G0439 PPPS, SUBSEQ VISIT: HCPCS | Performed by: INTERNAL MEDICINE

## 2021-09-20 PROCEDURE — 3008F BODY MASS INDEX DOCD: CPT | Performed by: INTERNAL MEDICINE

## 2021-09-20 PROCEDURE — 99397 PER PM REEVAL EST PAT 65+ YR: CPT | Performed by: INTERNAL MEDICINE

## 2021-09-20 PROCEDURE — 80061 LIPID PANEL: CPT

## 2021-09-20 PROCEDURE — 96160 PT-FOCUSED HLTH RISK ASSMT: CPT | Performed by: INTERNAL MEDICINE

## 2021-09-20 PROCEDURE — 82043 UR ALBUMIN QUANTITATIVE: CPT

## 2021-09-20 PROCEDURE — 81001 URINALYSIS AUTO W/SCOPE: CPT | Performed by: INTERNAL MEDICINE

## 2021-09-20 PROCEDURE — 82570 ASSAY OF URINE CREATININE: CPT

## 2021-09-20 RX ORDER — VALACYCLOVIR HYDROCHLORIDE 1 G/1
TABLET, FILM COATED ORAL
Qty: 4 TABLET | Refills: 2 | Status: SHIPPED | OUTPATIENT
Start: 2021-09-20

## 2021-09-20 NOTE — TELEPHONE ENCOUNTER
I sent patient results note email. Iveth Gomez, your blood test results are okay. The urine test results are pending. Dr Sushma Pelaez. 9/20/21–CMP lipid TSH A1c UA/MA vitamin B12-- LDL 79 A1c 5.5.  UA/MA–pending.

## 2021-09-20 NOTE — PATIENT INSTRUCTIONS
Neurologist for foot drop--Dr Valentin Ashford. Rx sent to [facility] pharmacy for Valtrex if needed for cold sores.

## 2021-09-20 NOTE — PROGRESS NOTES
Kyara Cordova is a 77year old female who presents for     Check at 6 months     Feels good. L foot drop. Walks for exercise 3-4 mi/d. Pt asks if anything can be done about foot drop as it didn't improve after lumbar surgery 2019.  Interested in seeing medication. • Vitamin B12 deficiency 07/2017    Vitamin B12 level low at 126 on 7/28/17.       Past Surgical History:   Procedure Laterality Date   • ANGIOPLASTY (CORONARY)  2007   • APPENDECTOMY  1974   • BACK SURGERY  04/09/2019     Re-exploration and r kg)  09/09/20 : 106 lb 3.2 oz (48.2 kg)      General: appears well nourished and in no acute distress  Neck--no nodes, thyr abn or carotid bruits. at 9/20/21 visit  Lungs: clear to auscultation  Heart: regular rhythm, S1S2 normal without murmur  Breasts: no endoscopy-9/11/17-neg.     Recurrent cold sores--if occurs, take valtrex 2 gm q 12 hrs for 2 doses. At risk for falls--fall precautions.        Past:  Hx Vitamin B12 deficiency--(not added as dx this visit) resolved with B12 level 691 on 9/9/21 off B 12 1 tablet (600 mg total) by mouth 2 (two) times daily. 180 tablet 3   • metFORMIN HCl 500 MG Oral Tab Take 1 tablet (500 mg total) by mouth 2 (two) times daily with meals.  180 tablet 3   • lisinopril 2.5 MG Oral Tab Take 1 tablet (2.5 mg total) by mouth ann Problems? : Yes    Difficulty walking?: No    Difficulty dressing or bathing?: No    Problems with daily activities? : No    Memory Problems?: No      Fall/Risk Assessment     Do you have 3 or more medical conditions?: 1-Yes    Have you fallen in the last Colonoscopy Screen every 10 years Colonoscopy due on 08/29/2027 Update Health Maintenance if applicable    Flex Sigmoidoscopy Screen every 5 years No results found for this or any previous visit. No flowsheet data found.      Fecal Occult Blood Annually No Creatinine (mg/dL)   Date Value   07/21/2021 0.92    No flowsheet data found. Digoxin Serum Conc  Annually No results found for: DIGOXIN No flowsheet data found.     Diabetes      HgbA1C  Annually HEMOGLOBIN A1C (%)   Date Value   03/05/2021 5.4    No fl

## 2021-09-21 NOTE — TELEPHONE ENCOUNTER
To nursing, please tell patient lab results are okay including A1c 5.5 indicates good overall diabetes control. Thanks. Additional results noted today:  9/20/21–urine MA/creat 33–same as 9/9/20 UA 30 protein–same as 9/9/20. Patient takes lisinopril.

## 2021-09-21 NOTE — TELEPHONE ENCOUNTER
Requested Prescriptions     Pending Prescriptions Disp Refills   • LEFLUNOMIDE 20 MG Oral Tab [Pharmacy Med Name: LEFLUNOMIDE  20MG  TAB] 90 tablet 3     Sig: TAKE 1 TABLET BY MOUTH  DAILY     LF: 5/25/21 #90 TAB W/ 1 RF  LOV: 7/21/21  Future Appointments ALT (SGPT)      13 - 56 U/L 21   AST (SGOT)      15 - 37 U/L 16       Summary:  1. Cont. Sulfasalazine 1000mg twice a day -     2. Cont.  lelfunomide  20mg a day -    3. Cont.  cimzi 400mg a month.today   4. Return to clinic in 6 months.    5. Labs today

## 2021-09-22 RX ORDER — LEFLUNOMIDE 20 MG/1
TABLET ORAL
Qty: 90 TABLET | Refills: 3 | Status: SHIPPED | OUTPATIENT
Start: 2021-09-22

## 2021-10-18 ENCOUNTER — NURSE ONLY (OUTPATIENT)
Dept: RHEUMATOLOGY | Facility: CLINIC | Age: 76
End: 2021-10-18
Payer: COMMERCIAL

## 2021-10-18 DIAGNOSIS — M06.9 RHEUMATOID ARTHRITIS, INVOLVING UNSPECIFIED SITE, UNSPECIFIED WHETHER RHEUMATOID FACTOR PRESENT (HCC): Primary | ICD-10-CM

## 2021-10-18 PROCEDURE — 96372 THER/PROPH/DIAG INJ SC/IM: CPT | Performed by: INTERNAL MEDICINE

## 2021-11-15 ENCOUNTER — TELEPHONE (OUTPATIENT)
Dept: RHEUMATOLOGY | Facility: CLINIC | Age: 76
End: 2021-11-15

## 2021-11-15 NOTE — TELEPHONE ENCOUNTER
Pt contacted and scheduled for a Cimzia injection.       Future Appointments   Date Time Provider William Vergara   11/22/2021  1:00 PM [facility] RN RHEUMATOLOGY 2014 [facility]   12/1/2021 11:00 AM DO SERGEI Flynn [facility]

## 2021-11-15 NOTE — TELEPHONE ENCOUNTER
Left message to call office back. Please schedule for Cimzia injection on RHE nurse schedule when she call back. Please let office know when pt is scheduled.

## 2021-11-22 ENCOUNTER — NURSE ONLY (OUTPATIENT)
Dept: RHEUMATOLOGY | Facility: CLINIC | Age: 76
End: 2021-11-22
Payer: COMMERCIAL

## 2021-11-22 DIAGNOSIS — M06.00 RHEUMATOID ARTHRITIS WITH NEGATIVE RHEUMATOID FACTOR, INVOLVING UNSPECIFIED SITE (HCC): Primary | ICD-10-CM

## 2021-11-22 PROCEDURE — 96372 THER/PROPH/DIAG INJ SC/IM: CPT | Performed by: INTERNAL MEDICINE

## 2021-12-01 ENCOUNTER — OFFICE VISIT (OUTPATIENT)
Dept: NEUROLOGY | Facility: CLINIC | Age: 76
End: 2021-12-01
Payer: COMMERCIAL

## 2021-12-01 VITALS
DIASTOLIC BLOOD PRESSURE: 60 MMHG | BODY MASS INDEX: 23.73 KG/M2 | WEIGHT: 110 LBS | HEART RATE: 70 BPM | SYSTOLIC BLOOD PRESSURE: 100 MMHG | HEIGHT: 57 IN

## 2021-12-01 DIAGNOSIS — M21.372 LEFT FOOT DROP: Primary | ICD-10-CM

## 2021-12-01 DIAGNOSIS — S34.124S: ICD-10-CM

## 2021-12-01 PROBLEM — N18.30 CHRONIC KIDNEY DISEASE, STAGE III (MODERATE) (HCC): Status: ACTIVE | Noted: 2021-12-01

## 2021-12-01 PROCEDURE — 99205 OFFICE O/P NEW HI 60 MIN: CPT | Performed by: OTHER

## 2021-12-01 PROCEDURE — 3078F DIAST BP <80 MM HG: CPT | Performed by: OTHER

## 2021-12-01 PROCEDURE — 3008F BODY MASS INDEX DOCD: CPT | Performed by: OTHER

## 2021-12-01 PROCEDURE — 3074F SYST BP LT 130 MM HG: CPT | Performed by: OTHER

## 2021-12-01 NOTE — PROGRESS NOTES
[facility] 37  [address]  [phone]          [facility] 37  NEW PATIENT EVALUATION  Reason for Admission/Consultation:  Left sided sciatica w/ drop foot    Requested by:   left humerus. Currently she has no neuropathic pain. States it is \"never real bad. \" Notices it sometimes after a walk. Walked 5 miles yesterday. Pain is stabbing; located \"at the ball of the foot and the big toe. \"     Reports her big toe my appear m Oral Tab, TAKE 1 TABLET BY MOUTH  DAILY, Disp: 90 tablet, Rfl: 3  •  valACYclovir (VALTREX) 1 G Oral Tab, Take 2 tablets at onset of cold sore and repeat same dose in 12 hours. , Disp: 4 tablet, Rfl: 2  •  Metoprolol Tartrate 50 MG Oral Tab, Take 1 tablet ( death   • Cancer Maternal Aunt [de-identified]        pancreatic   • Macular degeneration Mother    • Other ( age 80 old age) Mother    • Stroke Sister 46        CVA   • Other (1 brother, 2 sisters.) Other    • Other (sciatica) Brother    • Other (2 sons, 1 daughte nystagmus. No ptosis. V1-V3 intact B/L to light touch. No pathological facial asymmetry. No flattening of the nasolabial fold. Leanora Dunker Hearing grossly intact. Tongue midline. No atrophy or fasiculations of the tongue noted.  Palate and uvula elevate symmetricall extremities are intact. Pinprick:   Vibration: normal except in her distal left foot and first toe. Absent in left first toe. Gradient loss in left leg; nearly absent in left foot.    Proprioception: impaired in second toe bilaterally; able to determine c in Jan 2019. On exam she has a left foot drop w/ expected weakness in dorsi> plantar flexion, eversion/inversion. She has sensory loss in her distal left leg; no clear peroneal or peripheral pattern.  She has edema and some erythema likely secondary to prio further testing and medications required. Return to clinic in: No follow-ups on file.     Johan Fragoso DO  Staff Vascular & General Neurology   12/01/21  11:48 AM

## 2021-12-09 ENCOUNTER — PATIENT MESSAGE (OUTPATIENT)
Dept: INTERNAL MEDICINE CLINIC | Facility: CLINIC | Age: 76
End: 2021-12-09

## 2021-12-09 RX ORDER — SIMVASTATIN 40 MG
TABLET ORAL
Qty: 90 TABLET | Refills: 3 | OUTPATIENT
Start: 2021-12-09

## 2021-12-09 RX ORDER — LISINOPRIL 2.5 MG/1
TABLET ORAL
Qty: 90 TABLET | Refills: 3 | OUTPATIENT
Start: 2021-12-09

## 2021-12-09 RX ORDER — GABAPENTIN 600 MG/1
TABLET ORAL
Qty: 180 TABLET | Refills: 3 | OUTPATIENT
Start: 2021-12-09

## 2021-12-09 RX ORDER — METOPROLOL TARTRATE 50 MG/1
TABLET, FILM COATED ORAL
Qty: 180 TABLET | Refills: 3 | OUTPATIENT
Start: 2021-12-09

## 2021-12-10 ENCOUNTER — PATIENT MESSAGE (OUTPATIENT)
Dept: INTERNAL MEDICINE CLINIC | Facility: CLINIC | Age: 76
End: 2021-12-10

## 2021-12-10 NOTE — TELEPHONE ENCOUNTER
From: Clint Das  Sent: 12/10/2021 12:46 PM CST  To: [facility] Clinical Staff  Subject: Prescription Renewals    Thank you,  Linh. This is a little confusing because I did not request the refills. [facility] pushed for the refills for some reason.  I have

## 2021-12-10 NOTE — TELEPHONE ENCOUNTER
From: Oliverio Wyman  To: Laura Lilyl MD  Sent: 12/9/2021 10:44 PM CST  Subject: Prescription Renewals    It looks like I had 5 prescription renewals denied. We’re there multiple requests submitted and one of each was approved?

## 2021-12-10 NOTE — TELEPHONE ENCOUNTER
Current refill request refused due to refill is either a duplicate request or has active refills at the pharmacy. Check previous templates.     Requested Prescriptions     Refused Prescriptions Disp Refills   • simvastatin 40 MG Oral Tab [Pharmacy Med Name

## 2021-12-11 ENCOUNTER — PATIENT MESSAGE (OUTPATIENT)
Dept: RHEUMATOLOGY | Facility: CLINIC | Age: 76
End: 2021-12-11

## 2021-12-13 RX ORDER — SULFASALAZINE 500 MG/1
1000 TABLET ORAL 2 TIMES DAILY
Qty: 360 TABLET | Refills: 0 | Status: SHIPPED | OUTPATIENT
Start: 2021-12-13

## 2021-12-13 NOTE — TELEPHONE ENCOUNTER
LOV: 7/21/2021  Future Appointments   Date Time Provider William Vergara   12/27/2021  1:00 PM [facility] RN RHEUMATOLOGY 2014 [facility]   12/28/2021  1:00 PM Estefania Rendon MD [facility] [place] [facility]   2/9/2022 12:30 PM DO David Rondon

## 2021-12-13 NOTE — TELEPHONE ENCOUNTER
From: Alma Hannah  To: Kim Rosado MD  Sent: 12/11/2021 5:20 PM CST  Subject: Sulfasalazine    I tried to refill my Sulfasalazine prescription. [facility] informed me it is out of stock.  [facility] [place]? I took

## 2021-12-26 ENCOUNTER — LAB ENCOUNTER (OUTPATIENT)
Dept: LAB | Facility: HOSPITAL | Age: 76
End: 2021-12-26
Attending: INTERNAL MEDICINE
Payer: MEDICARE

## 2021-12-26 DIAGNOSIS — Z51.81 THERAPEUTIC DRUG MONITORING: ICD-10-CM

## 2021-12-26 DIAGNOSIS — M06.9 RHEUMATOID ARTHRITIS, INVOLVING UNSPECIFIED SITE, UNSPECIFIED WHETHER RHEUMATOID FACTOR PRESENT (HCC): ICD-10-CM

## 2021-12-26 PROCEDURE — 84450 TRANSFERASE (AST) (SGOT): CPT

## 2021-12-26 PROCEDURE — 36415 COLL VENOUS BLD VENIPUNCTURE: CPT

## 2021-12-26 PROCEDURE — 86140 C-REACTIVE PROTEIN: CPT

## 2021-12-26 PROCEDURE — 85652 RBC SED RATE AUTOMATED: CPT

## 2021-12-26 PROCEDURE — 82565 ASSAY OF CREATININE: CPT

## 2021-12-26 PROCEDURE — 85027 COMPLETE CBC AUTOMATED: CPT

## 2021-12-26 PROCEDURE — 84460 ALANINE AMINO (ALT) (SGPT): CPT

## 2021-12-26 RX ORDER — METOPROLOL TARTRATE 50 MG/1
TABLET, FILM COATED ORAL
Qty: 180 TABLET | Refills: 3 | OUTPATIENT
Start: 2021-12-26

## 2021-12-26 RX ORDER — SIMVASTATIN 40 MG
TABLET ORAL
Qty: 90 TABLET | Refills: 3 | OUTPATIENT
Start: 2021-12-26

## 2021-12-26 RX ORDER — LISINOPRIL 2.5 MG/1
TABLET ORAL
Qty: 90 TABLET | Refills: 3 | OUTPATIENT
Start: 2021-12-26

## 2021-12-26 RX ORDER — GABAPENTIN 600 MG/1
TABLET ORAL
Qty: 180 TABLET | Refills: 3 | OUTPATIENT
Start: 2021-12-26

## 2021-12-26 NOTE — TELEPHONE ENCOUNTER
Current refill request refused due to refill is either a duplicate request or has active refills at the pharmacy. Check previous templates.     Requested Prescriptions     Refused Prescriptions Disp Refills   • METFORMIN 500 MG Oral Tab [Pharmacy Med Name:

## 2021-12-28 ENCOUNTER — OFFICE VISIT (OUTPATIENT)
Dept: RHEUMATOLOGY | Facility: CLINIC | Age: 76
End: 2021-12-28
Payer: COMMERCIAL

## 2021-12-28 ENCOUNTER — TELEPHONE (OUTPATIENT)
Dept: RHEUMATOLOGY | Facility: CLINIC | Age: 76
End: 2021-12-28

## 2021-12-28 ENCOUNTER — PROCEDURE VISIT (OUTPATIENT)
Dept: NEUROLOGY | Facility: CLINIC | Age: 76
End: 2021-12-28
Payer: COMMERCIAL

## 2021-12-28 VITALS
DIASTOLIC BLOOD PRESSURE: 70 MMHG | HEIGHT: 57 IN | HEART RATE: 62 BPM | BODY MASS INDEX: 23.73 KG/M2 | WEIGHT: 110 LBS | SYSTOLIC BLOOD PRESSURE: 111 MMHG | RESPIRATION RATE: 16 BRPM

## 2021-12-28 DIAGNOSIS — Z51.81 THERAPEUTIC DRUG MONITORING: ICD-10-CM

## 2021-12-28 DIAGNOSIS — M21.372 LEFT FOOT DROP: Primary | ICD-10-CM

## 2021-12-28 DIAGNOSIS — M81.0 AGE-RELATED OSTEOPOROSIS WITHOUT CURRENT PATHOLOGICAL FRACTURE: ICD-10-CM

## 2021-12-28 DIAGNOSIS — M06.00 RHEUMATOID ARTHRITIS WITH NEGATIVE RHEUMATOID FACTOR, INVOLVING UNSPECIFIED SITE (HCC): Primary | ICD-10-CM

## 2021-12-28 PROCEDURE — 3078F DIAST BP <80 MM HG: CPT | Performed by: INTERNAL MEDICINE

## 2021-12-28 PROCEDURE — 96372 THER/PROPH/DIAG INJ SC/IM: CPT | Performed by: INTERNAL MEDICINE

## 2021-12-28 PROCEDURE — 95886 MUSC TEST DONE W/N TEST COMP: CPT | Performed by: OTHER

## 2021-12-28 PROCEDURE — 99214 OFFICE O/P EST MOD 30 MIN: CPT | Performed by: INTERNAL MEDICINE

## 2021-12-28 PROCEDURE — 95912 NRV CNDJ TEST 11-12 STUDIES: CPT | Performed by: OTHER

## 2021-12-28 PROCEDURE — 3074F SYST BP LT 130 MM HG: CPT | Performed by: INTERNAL MEDICINE

## 2021-12-28 PROCEDURE — 3008F BODY MASS INDEX DOCD: CPT | Performed by: INTERNAL MEDICINE

## 2021-12-28 NOTE — PROCEDURES
HISTORY:    Lani Hidalgo is a 77year old female with a complaint of for couple of years. Apparently had the spinal surgery at L4-L5 discectomy and laminectomy. However continued to have left foot drop.       PHYSICAL:    Cranial nerves grossly normal. decreased amplitudes for the tibial nerve in the left side. No response were obtainable from the median nerve.     Needle electromyography of the left lower extremity was normal, except of severe amount of denervation changes with significant amount of P w 2. 34 3.23 31.7 53.0 Median Wrist - Dig II       Ulnar Wrist Dig V 2.45 3.13 31.6 59.2 Ulnar Wrist - Median Wrist -0.10       EMG Summary Table     Spontaneous MUAP Recruitment   Muscle Nerve Roots IA Fib PSW Fasc H.F. Comments Amp Dur. PPP Pattern   ROSEMARY Morel

## 2021-12-28 NOTE — PROGRESS NOTES
Lou Jones is a 77year old female. HPI:   Patient presents with:  Rheumatoid Arthritis  Medication Follow-Up  Hand Pain: Left      I had the pleasure of seeing Sneha Velásquez .    As you recall she is extremely pleasant 67-year-old who's a history of have some  problesm with the the left foot and toe - had a left foot drop  Sciatica pain is better  The RA is not bad. She feels it's mostly her arm being in a sling that she's not being able to do things.      8/26/2019  She had sciatica pian in 1/2019 0 a shoulder is ok now. Her family is ok. No side effects with covid vaccine. She is forgetting  To take afternoon ssz - and she is takign 1000mg bid really.      12/28/2021   She is doing well, she has pain I her left 1-5th knuckles it's just started 1 w 1 G Oral Tab Take 2 tablets at onset of cold sore and repeat same dose in 12 hours. 4 tablet 2   • Metoprolol Tartrate 50 MG Oral Tab Take 1 tablet (50 mg total) by mouth 2 (two) times daily.  180 tablet 3   • gabapentin 600 MG Oral Tab Take 1 tablet (600 m 9/20/2021 9/20/2021           12:11 PM 12:11 PM   Glucose      70 - 99 mg/dL   105 (H)   Sodium      136 - 145 mmol/L   141   Potassium      3.5 - 5.1 mmol/L   4.4   Chloride      98 - 112 mmol/L   107   Carbon Dioxide, Total      21.0 - 32.0 mmol/L   28. 0 fL 45.6     Platelet Count      672.6 - 450.0 10(3)uL 253.0     Cholesterol, Total      <200 mg/dL  171    HDL Cholesterol      40 - 59 mg/dL  63 (H)    Triglycerides      30 - 149 mg/dL  170 (H)    LDL Cholesterol Calc      <100 mg/dL  79    VLDL      0 - aryan from back sx -    -switched to leflunomide 10mg a day on 3/2016 - from methotrexate 15mg a week   - stopped leflunomide in beginning of 3/2017 - b/c of expense but went back on b/c of her pain being bad   3/2018 - quantiferon gold and hep b and c mary ellen

## 2021-12-28 NOTE — PATIENT INSTRUCTIONS
1. Cont. Sulfasalazine 1000mg twice a day -     2. Cont.  lelfunomide  20mg a day -    3. Cont.  cimzi 400mg a month. -   4. Return to clinic in 6 months. 5. Labs today and then in 6 months.    6. Consider switching to at home cimzia, or enbrel/humira if

## 2021-12-28 NOTE — TELEPHONE ENCOUNTER
Pt. Is paying $650 a month for cimzia in office-for the first 6 months then not paying after that -  Can we see how much it would be if she got it through part D? And is there pt. Assistance.    She also could get Enrbrel or humira - and see if she qualif

## 2022-01-09 RX ORDER — LISINOPRIL 2.5 MG/1
TABLET ORAL
Qty: 90 TABLET | Refills: 3 | Status: SHIPPED | OUTPATIENT
Start: 2022-01-09

## 2022-01-09 RX ORDER — GABAPENTIN 600 MG/1
TABLET ORAL
Qty: 180 TABLET | Refills: 3 | OUTPATIENT
Start: 2022-01-09

## 2022-01-09 RX ORDER — METOPROLOL TARTRATE 50 MG/1
TABLET, FILM COATED ORAL
Qty: 180 TABLET | Refills: 3 | OUTPATIENT
Start: 2022-01-09

## 2022-01-09 RX ORDER — SIMVASTATIN 40 MG
TABLET ORAL
Qty: 90 TABLET | Refills: 3 | Status: SHIPPED | OUTPATIENT
Start: 2022-01-09

## 2022-01-09 RX ORDER — HYDROCHLOROTHIAZIDE 12.5 MG/1
TABLET ORAL
Qty: 90 TABLET | Refills: 3 | Status: SHIPPED | OUTPATIENT
Start: 2022-01-09

## 2022-01-11 NOTE — TELEPHONE ENCOUNTER
Cimzia approved through 12/31/2022. [id number]    Attemted to contact patient. She will need to find out cost of medication and inform us if she is going to apply for patient assistance or if medication is unaffordable.      If unaffordable Dr. Beatrice Chambers

## 2022-01-11 NOTE — TELEPHONE ENCOUNTER
Called patient and informed her of Edwin's message. She will call pharmacy on Thursday once Dr. Betsy Bryan sign script tomorrow. She will call us back with update if she wants assistance or not.

## 2022-01-17 NOTE — PATIENT INSTRUCTIONS
1. Schedule colonoscopy and upper endoscopy with Dr. Analilia Kingston with MAC sedation. 2.  bowel prep from pharmacy (Colyte split dose preparation)    3. Medication adjustment:       A. Day BEFORE colonoscopy: HOLD Metformin and Glimepiride      B.  D lung pain/COUGH

## 2022-01-18 ENCOUNTER — TELEPHONE (OUTPATIENT)
Dept: RHEUMATOLOGY | Facility: CLINIC | Age: 77
End: 2022-01-18

## 2022-01-28 ENCOUNTER — NURSE ONLY (OUTPATIENT)
Dept: RHEUMATOLOGY | Facility: CLINIC | Age: 77
End: 2022-01-28
Payer: COMMERCIAL

## 2022-01-28 DIAGNOSIS — M06.00 RHEUMATOID ARTHRITIS WITH NEGATIVE RHEUMATOID FACTOR, INVOLVING UNSPECIFIED SITE (HCC): Primary | ICD-10-CM

## 2022-01-28 PROCEDURE — 96372 THER/PROPH/DIAG INJ SC/IM: CPT | Performed by: INTERNAL MEDICINE

## 2022-02-09 ENCOUNTER — OFFICE VISIT (OUTPATIENT)
Dept: NEUROLOGY | Facility: CLINIC | Age: 77
End: 2022-02-09
Payer: COMMERCIAL

## 2022-02-09 ENCOUNTER — TELEPHONE (OUTPATIENT)
Dept: PHYSICAL MEDICINE AND REHAB | Facility: CLINIC | Age: 77
End: 2022-02-09

## 2022-02-09 VITALS
BODY MASS INDEX: 23.73 KG/M2 | DIASTOLIC BLOOD PRESSURE: 64 MMHG | HEART RATE: 64 BPM | HEIGHT: 57 IN | WEIGHT: 110 LBS | SYSTOLIC BLOOD PRESSURE: 122 MMHG

## 2022-02-09 DIAGNOSIS — M21.372 LEFT FOOT DROP: Primary | ICD-10-CM

## 2022-02-09 PROCEDURE — 3078F DIAST BP <80 MM HG: CPT | Performed by: OTHER

## 2022-02-09 PROCEDURE — 99214 OFFICE O/P EST MOD 30 MIN: CPT | Performed by: OTHER

## 2022-02-09 PROCEDURE — 3074F SYST BP LT 130 MM HG: CPT | Performed by: OTHER

## 2022-02-09 PROCEDURE — 3008F BODY MASS INDEX DOCD: CPT | Performed by: OTHER

## 2022-02-09 NOTE — PATIENT INSTRUCTIONS
The foot drop may  not improve. We should do a MRI to look for any worsening disk disease.   Please go to physical therapy

## 2022-02-09 NOTE — TELEPHONE ENCOUNTER
Initiated authorization for L-Spine MRI CPT [id number] with [facility] online  Case [id number].     Status: Approved-This member's benefit plan did not require a prior authorization for this request    Patient notified via [employer]

## 2022-02-14 ENCOUNTER — HOSPITAL ENCOUNTER (OUTPATIENT)
Dept: BONE DENSITY | Facility: HOSPITAL | Age: 77
Discharge: HOME | End: 2022-02-14
Attending: INTERNAL MEDICINE
Payer: MEDICARE

## 2022-02-14 DIAGNOSIS — M81.0 AGE-RELATED OSTEOPOROSIS WITHOUT CURRENT PATHOLOGICAL FRACTURE: ICD-10-CM

## 2022-02-14 PROCEDURE — 77080 DXA BONE DENSITY AXIAL: CPT | Performed by: INTERNAL MEDICINE

## 2022-02-25 ENCOUNTER — NURSE ONLY (OUTPATIENT)
Dept: RHEUMATOLOGY | Facility: CLINIC | Age: 77
End: 2022-02-25
Payer: COMMERCIAL

## 2022-02-25 DIAGNOSIS — M06.00 RHEUMATOID ARTHRITIS WITH NEGATIVE RHEUMATOID FACTOR, INVOLVING UNSPECIFIED SITE (HCC): Primary | ICD-10-CM

## 2022-02-25 PROCEDURE — 96372 THER/PROPH/DIAG INJ SC/IM: CPT | Performed by: INTERNAL MEDICINE

## 2022-02-25 NOTE — PROGRESS NOTES
Verified name and . Patient aware receiving Cimzia injection. Injections given. Patient tolerated well. Patient aware to follow up in 1 month for next injection.

## 2022-03-09 ENCOUNTER — HOSPITAL ENCOUNTER (OUTPATIENT)
Dept: MRI IMAGING | Facility: HOSPITAL | Age: 77
Discharge: HOME | End: 2022-03-09
Attending: Other
Payer: MEDICARE

## 2022-03-09 ENCOUNTER — TELEPHONE (OUTPATIENT)
Dept: INTERNAL MEDICINE CLINIC | Facility: CLINIC | Age: 77
End: 2022-03-09

## 2022-03-09 DIAGNOSIS — M21.372 LEFT FOOT DROP: ICD-10-CM

## 2022-03-09 PROCEDURE — 72148 MRI LUMBAR SPINE W/O DYE: CPT | Performed by: OTHER

## 2022-03-11 RX ORDER — GABAPENTIN 600 MG/1
TABLET ORAL
Qty: 180 TABLET | Refills: 3 | Status: SHIPPED | OUTPATIENT
Start: 2022-03-11

## 2022-03-17 ENCOUNTER — PATIENT MESSAGE (OUTPATIENT)
Dept: INTERNAL MEDICINE CLINIC | Facility: CLINIC | Age: 77
End: 2022-03-17

## 2022-03-17 ENCOUNTER — OFFICE VISIT (OUTPATIENT)
Dept: INTERNAL MEDICINE CLINIC | Facility: CLINIC | Age: 77
End: 2022-03-17
Payer: COMMERCIAL

## 2022-03-17 VITALS
HEIGHT: 57 IN | BODY MASS INDEX: 23.48 KG/M2 | HEART RATE: 87 BPM | OXYGEN SATURATION: 93 % | WEIGHT: 108.81 LBS | SYSTOLIC BLOOD PRESSURE: 126 MMHG | TEMPERATURE: 98 F | DIASTOLIC BLOOD PRESSURE: 78 MMHG

## 2022-03-17 DIAGNOSIS — M21.372 LEFT FOOT DROP: ICD-10-CM

## 2022-03-17 DIAGNOSIS — M06.00 RHEUMATOID ARTHRITIS WITH NEGATIVE RHEUMATOID FACTOR, INVOLVING UNSPECIFIED SITE (HCC): ICD-10-CM

## 2022-03-17 DIAGNOSIS — R11.0 NAUSEA: Primary | ICD-10-CM

## 2022-03-17 DIAGNOSIS — I10 ESSENTIAL HYPERTENSION: ICD-10-CM

## 2022-03-17 DIAGNOSIS — R19.7 DIARRHEA, UNSPECIFIED TYPE: ICD-10-CM

## 2022-03-17 PROCEDURE — 3008F BODY MASS INDEX DOCD: CPT | Performed by: INTERNAL MEDICINE

## 2022-03-17 PROCEDURE — 3074F SYST BP LT 130 MM HG: CPT | Performed by: INTERNAL MEDICINE

## 2022-03-17 PROCEDURE — 3078F DIAST BP <80 MM HG: CPT | Performed by: INTERNAL MEDICINE

## 2022-03-17 PROCEDURE — 99214 OFFICE O/P EST MOD 30 MIN: CPT | Performed by: INTERNAL MEDICINE

## 2022-03-17 RX ORDER — ONDANSETRON 4 MG/1
4 TABLET, FILM COATED ORAL EVERY 8 HOURS PRN
Qty: 30 TABLET | Refills: 0 | Status: SHIPPED | OUTPATIENT
Start: 2022-03-17

## 2022-03-17 RX ORDER — GABAPENTIN 600 MG/1
600 TABLET ORAL 2 TIMES DAILY
Qty: 28 TABLET | Refills: 0 | Status: SHIPPED | OUTPATIENT
Start: 2022-03-17 | End: 2022-03-31

## 2022-03-17 RX ORDER — ONDANSETRON 4 MG/1
4 TABLET, FILM COATED ORAL EVERY 8 HOURS PRN
Refills: 0 | Status: CANCELLED | OUTPATIENT
Start: 2022-03-17

## 2022-03-17 NOTE — TELEPHONE ENCOUNTER
From: Edith Cosby  To: Diaz Kaur MD  Sent: 3/17/2022 12:26 PM CDT  Subject: Not feeling well    I have been feeling nauseated since Sunday. I did take a home Covid test this morning and do not have Covid.  Can you give me a prescription for nausea that I can  at [facility]?    Juan Miranda

## 2022-03-17 NOTE — TELEPHONE ENCOUNTER
To Dr. Ana Tyson---    Are you able to advise in MD absence? (on call has been receiving many calls today)    Spoke to pt who reports since Sunday intermittent nausea that is worse after eating. Only 1x emesis on Sunday. Has been tolerating ginger ale. Also c/o nasal congestion, runny nose, mild cough additionally. Denies sick contacts, breathing issues, conjunctivitis, ear pain, visual changes, sore throat, fever, diarrhea, abdominal pain. Pt had negative rapid covid test at home    Please advise thanks!

## 2022-03-17 NOTE — TELEPHONE ENCOUNTER
Noted. Spoke to pt and advised on MD message below; pt verbalized understanding.     appt scheduled per MD

## 2022-03-17 NOTE — TELEPHONE ENCOUNTER
Is patient able to be seen today?   Would like to do clinical evaluation prior to giving recommendations

## 2022-03-18 ENCOUNTER — TELEPHONE (OUTPATIENT)
Dept: INTERNAL MEDICINE CLINIC | Facility: CLINIC | Age: 77
End: 2022-03-18

## 2022-03-18 NOTE — TELEPHONE ENCOUNTER
To Lor Berumen - called patient , spoke  - he is not clear about this- the local pharmacy did not give them the gabapentin and they are waiting for refill from [facility], in SIG states not taking .

## 2022-03-18 NOTE — TELEPHONE ENCOUNTER
Fax rec'd from [employer] authorization is not required at this time  This medication or product is on your plan's list of covered drugs\"  Fax placed in red folder  Tasked to RX as meredithAkbar Horton

## 2022-03-25 ENCOUNTER — NURSE ONLY (OUTPATIENT)
Dept: RHEUMATOLOGY | Facility: CLINIC | Age: 77
End: 2022-03-25
Payer: COMMERCIAL

## 2022-03-25 DIAGNOSIS — M06.00 RHEUMATOID ARTHRITIS WITH NEGATIVE RHEUMATOID FACTOR, INVOLVING UNSPECIFIED SITE (HCC): Primary | ICD-10-CM

## 2022-03-25 PROCEDURE — 96372 THER/PROPH/DIAG INJ SC/IM: CPT | Performed by: INTERNAL MEDICINE

## 2022-03-25 NOTE — PROGRESS NOTES
Patient came in for a nurse visit to receive Cimzia injection. Patient verified name and  and was aware of receiving Cimzia injection. Patient received two Cimzia injection on bilateral lower abdomen. She tolerated well. She was informed to schedule next visit in 4 weeks.

## 2022-04-01 ENCOUNTER — OFFICE VISIT (OUTPATIENT)
Dept: NEUROLOGY | Facility: CLINIC | Age: 77
End: 2022-04-01
Payer: COMMERCIAL

## 2022-04-01 VITALS
DIASTOLIC BLOOD PRESSURE: 64 MMHG | HEIGHT: 58 IN | SYSTOLIC BLOOD PRESSURE: 120 MMHG | WEIGHT: 109 LBS | HEART RATE: 70 BPM | BODY MASS INDEX: 22.88 KG/M2

## 2022-04-01 DIAGNOSIS — M48.061 NEURAL FORAMINAL STENOSIS OF LUMBAR SPINE: Primary | ICD-10-CM

## 2022-04-01 DIAGNOSIS — M21.372 LEFT FOOT DROP: ICD-10-CM

## 2022-04-01 PROCEDURE — 3008F BODY MASS INDEX DOCD: CPT | Performed by: OTHER

## 2022-04-01 PROCEDURE — 99214 OFFICE O/P EST MOD 30 MIN: CPT | Performed by: OTHER

## 2022-04-01 PROCEDURE — 3078F DIAST BP <80 MM HG: CPT | Performed by: OTHER

## 2022-04-01 PROCEDURE — 3074F SYST BP LT 130 MM HG: CPT | Performed by: OTHER

## 2022-04-08 ENCOUNTER — IMMUNIZATION (OUTPATIENT)
Dept: LAB | Age: 77
End: 2022-04-08
Attending: EMERGENCY MEDICINE
Payer: MEDICARE

## 2022-04-08 DIAGNOSIS — Z23 NEED FOR VACCINATION: Primary | ICD-10-CM

## 2022-04-08 PROCEDURE — 0054A SARSCOV2 VAC 30MCG TRS SUCR: CPT

## 2022-04-11 NOTE — TELEPHONE ENCOUNTER
LOV: 12/28/21  Last Refilled:#360, 0rfs 12/13/21  Labs:AST 20  ALT 21  12/26/21    Future Appointments   Date Time Provider William Stilesi   4/13/2022  1:00 PM Meghna Reyna MD [facility] [place] [facility]   4/22/2022  1:00 PM EC [facility] RN RHEUMATOLOGY 2014 [facility]       Summary:  1. Cont. Sulfasalazine 1000mg twice a day -     2. Cont.  lelfunomide  20mg a day -    3. Cont.  cimzi 400mg a month. -   4. Return to clinic in 6 months. 5. Labs today and then in 6 months. 6. Consider switching to at home cimzia, or enbrel/humira if cost makes more sense - will look to see if you qualify for pt. assistance as well. 7. check DEXA - bone density - schedule   Karen Miguel MD  12/28/2021             Please advise.

## 2022-04-12 RX ORDER — SULFASALAZINE 500 MG/1
TABLET ORAL
Qty: 360 TABLET | Refills: 0 | Status: SHIPPED | OUTPATIENT
Start: 2022-04-12

## 2022-04-13 ENCOUNTER — TELEPHONE (OUTPATIENT)
Dept: SURGERY | Facility: CLINIC | Age: 77
End: 2022-04-13

## 2022-04-13 ENCOUNTER — OFFICE VISIT (OUTPATIENT)
Dept: SURGERY | Facility: CLINIC | Age: 77
End: 2022-04-13
Payer: COMMERCIAL

## 2022-04-13 VITALS — DIASTOLIC BLOOD PRESSURE: 58 MMHG | HEART RATE: 64 BPM | SYSTOLIC BLOOD PRESSURE: 98 MMHG

## 2022-04-13 DIAGNOSIS — M21.372 LEFT FOOT DROP: Primary | ICD-10-CM

## 2022-04-13 PROCEDURE — 3078F DIAST BP <80 MM HG: CPT | Performed by: NEUROLOGICAL SURGERY

## 2022-04-13 PROCEDURE — 99203 OFFICE O/P NEW LOW 30 MIN: CPT | Performed by: NEUROLOGICAL SURGERY

## 2022-04-13 PROCEDURE — 3074F SYST BP LT 130 MM HG: CPT | Performed by: NEUROLOGICAL SURGERY

## 2022-04-13 RX ORDER — GABAPENTIN 600 MG/1
600 TABLET ORAL 2 TIMES DAILY
COMMUNITY

## 2022-04-13 NOTE — PROGRESS NOTES
Patient is here referred by neurology Dr. Elsy Ferrell for left drop foot. Patient has history of L4-5 lumbar laminectomy    Pt had MRI done 3/9/2022  Pt had EMG done 12/28/2021  Pt was referred to PT by Dr. Clayton Kohli    Pt reports foot drop started before surgery and has been steady decline since surgery    Pt states she has some pain in ball of foot and big foot on left toe, also report numbness in toe area of left foot    Denies any symptoms in right leg, denies bowel or bladder incontinence. Reports occasional numbness in hands, balance issues.

## 2022-04-13 NOTE — TELEPHONE ENCOUNTER
Pt referred to:  Dr. Hamilton Joya.  DUDLEY peroneal neuropathy    Fax to:[phone]    Need LOV note and referral faxed to provider

## 2022-04-19 ENCOUNTER — PATIENT MESSAGE (OUTPATIENT)
Dept: SURGERY | Facility: CLINIC | Age: 77
End: 2022-04-19

## 2022-04-22 ENCOUNTER — NURSE ONLY (OUTPATIENT)
Dept: RHEUMATOLOGY | Facility: CLINIC | Age: 77
End: 2022-04-22
Payer: COMMERCIAL

## 2022-04-22 DIAGNOSIS — M06.00 RHEUMATOID ARTHRITIS WITH NEGATIVE RHEUMATOID FACTOR, INVOLVING UNSPECIFIED SITE (HCC): Primary | ICD-10-CM

## 2022-04-22 PROCEDURE — 96372 THER/PROPH/DIAG INJ SC/IM: CPT | Performed by: INTERNAL MEDICINE

## 2022-04-22 NOTE — PROGRESS NOTES
Patient came in for a nurse visit to receive Cimzia injection. Patient verified name and  and was aware of receiving Cimzia injection. Patient received two Cimzia injection on bilateral lower abdomen. She tolerated well. She was informed to schedule next visit in 4 weeks.  And in  with Dr. Phoebe Boles.

## 2022-04-26 RX ORDER — METOPROLOL TARTRATE 50 MG/1
TABLET, FILM COATED ORAL
Qty: 180 TABLET | Refills: 3 | Status: SHIPPED | OUTPATIENT
Start: 2022-04-26

## 2022-05-23 ENCOUNTER — NURSE ONLY (OUTPATIENT)
Dept: RHEUMATOLOGY | Facility: CLINIC | Age: 77
End: 2022-05-23
Payer: COMMERCIAL

## 2022-05-23 DIAGNOSIS — M06.00 RHEUMATOID ARTHRITIS WITH NEGATIVE RHEUMATOID FACTOR, INVOLVING UNSPECIFIED SITE (HCC): Primary | ICD-10-CM

## 2022-06-12 RX ORDER — SULFASALAZINE 500 MG/1
1000 TABLET ORAL 2 TIMES DAILY
Qty: 360 TABLET | Refills: 1 | Status: SHIPPED | OUTPATIENT
Start: 2022-06-12 | End: 2022-06-13

## 2022-06-13 ENCOUNTER — PATIENT MESSAGE (OUTPATIENT)
Dept: RHEUMATOLOGY | Facility: CLINIC | Age: 77
End: 2022-06-13

## 2022-06-13 DIAGNOSIS — M06.00 RHEUMATOID ARTHRITIS WITH NEGATIVE RHEUMATOID FACTOR, INVOLVING UNSPECIFIED SITE (HCC): Primary | ICD-10-CM

## 2022-06-13 RX ORDER — SULFASALAZINE 500 MG/1
1000 TABLET ORAL 2 TIMES DAILY
Qty: 360 TABLET | Refills: 1 | Status: SHIPPED | OUTPATIENT
Start: 2022-06-13

## 2022-06-13 NOTE — TELEPHONE ENCOUNTER
From: Joyce Vyas  To: Danisha Ruiz MD  Sent: 6/13/2022 2:46 PM CDT  Subject: Sulfasalazine. 500mg (2 tablets 2x/day    Thank you for placing an order for sulfasalazine. Unfortunately, I received an email from [address] [place], [place] [address]luc stating it is out of stock. [facility] [place] [facility] 360 tablets for 3 months?     Thank you,  Keanu Wallace

## 2022-06-15 NOTE — TELEPHONE ENCOUNTER
Pt contacted and given appointment for today at 11 am for Cimzia injection. This document is complete and the patient is ready for discharge.

## 2022-06-24 ENCOUNTER — TELEPHONE (OUTPATIENT)
Dept: RHEUMATOLOGY | Facility: CLINIC | Age: 77
End: 2022-06-24

## 2022-06-24 NOTE — TELEPHONE ENCOUNTER
Patient states she has an appointment 6/27 and usually has Cimzia injections when she comes in, patient just wanted to inform office ahead of time that she will not be getting Cimzia injections on Monday because she is having surgery on 6/30

## 2022-06-27 ENCOUNTER — OFFICE VISIT (OUTPATIENT)
Dept: RHEUMATOLOGY | Facility: CLINIC | Age: 77
End: 2022-06-27
Payer: COMMERCIAL

## 2022-06-27 ENCOUNTER — LAB ENCOUNTER (OUTPATIENT)
Dept: LAB | Facility: HOSPITAL | Age: 77
End: 2022-06-27
Attending: INTERNAL MEDICINE
Payer: MEDICARE

## 2022-06-27 VITALS
BODY MASS INDEX: 23.02 KG/M2 | HEART RATE: 80 BPM | DIASTOLIC BLOOD PRESSURE: 76 MMHG | SYSTOLIC BLOOD PRESSURE: 188 MMHG | HEIGHT: 58 IN | WEIGHT: 109.69 LBS

## 2022-06-27 DIAGNOSIS — M06.00 RHEUMATOID ARTHRITIS WITH NEGATIVE RHEUMATOID FACTOR, INVOLVING UNSPECIFIED SITE (HCC): Primary | ICD-10-CM

## 2022-06-27 DIAGNOSIS — M06.00 RHEUMATOID ARTHRITIS WITH NEGATIVE RHEUMATOID FACTOR, INVOLVING UNSPECIFIED SITE (HCC): ICD-10-CM

## 2022-06-27 DIAGNOSIS — Z51.81 THERAPEUTIC DRUG MONITORING: ICD-10-CM

## 2022-06-27 LAB
ALBUMIN SERPL-MCNC: 3.5 G/DL (ref 3.4–5)
ALBUMIN/GLOB SERPL: 0.9 {RATIO} (ref 1–2)
ALP LIVER SERPL-CCNC: 51 U/L
ALT SERPL-CCNC: 17 U/L
ANION GAP SERPL CALC-SCNC: 4 MMOL/L (ref 0–18)
AST SERPL-CCNC: 28 U/L (ref 15–37)
BILIRUB SERPL-MCNC: 0.3 MG/DL (ref 0.1–2)
BUN BLD-MCNC: 14 MG/DL (ref 7–18)
BUN/CREAT SERPL: 17.9 (ref 10–20)
CALCIUM BLD-MCNC: 8.8 MG/DL (ref 8.5–10.1)
CHLORIDE SERPL-SCNC: 107 MMOL/L (ref 98–112)
CO2 SERPL-SCNC: 28 MMOL/L (ref 21–32)
CREAT BLD-MCNC: 0.78 MG/DL
CRP SERPL-MCNC: 0.34 MG/DL (ref ?–0.3)
DEPRECATED RDW RBC AUTO: 45.6 FL (ref 35.1–46.3)
ERYTHROCYTE [DISTWIDTH] IN BLOOD BY AUTOMATED COUNT: 12.7 % (ref 11–15)
ERYTHROCYTE [SEDIMENTATION RATE] IN BLOOD: 23 MM/HR
FASTING STATUS PATIENT QL REPORTED: NO
GLOBULIN PLAS-MCNC: 3.8 G/DL (ref 2.8–4.4)
GLUCOSE BLD-MCNC: 79 MG/DL (ref 70–99)
HCT VFR BLD AUTO: 35.5 %
HGB BLD-MCNC: 11.1 G/DL
MCH RBC QN AUTO: 30.5 PG (ref 26–34)
MCHC RBC AUTO-ENTMCNC: 31.3 G/DL (ref 31–37)
MCV RBC AUTO: 97.5 FL
OSMOLALITY SERPL CALC.SUM OF ELEC: 287 MOSM/KG (ref 275–295)
PLATELET # BLD AUTO: 250 10(3)UL (ref 150–450)
POTASSIUM SERPL-SCNC: 4.4 MMOL/L (ref 3.5–5.1)
PROT SERPL-MCNC: 7.3 G/DL (ref 6.4–8.2)
RBC # BLD AUTO: 3.64 X10(6)UL
SODIUM SERPL-SCNC: 139 MMOL/L (ref 136–145)
WBC # BLD AUTO: 7.8 X10(3) UL (ref 4–11)

## 2022-06-27 PROCEDURE — 80053 COMPREHEN METABOLIC PANEL: CPT

## 2022-06-27 PROCEDURE — 86140 C-REACTIVE PROTEIN: CPT

## 2022-06-27 PROCEDURE — 85652 RBC SED RATE AUTOMATED: CPT

## 2022-06-27 PROCEDURE — 3077F SYST BP >= 140 MM HG: CPT | Performed by: INTERNAL MEDICINE

## 2022-06-27 PROCEDURE — 99214 OFFICE O/P EST MOD 30 MIN: CPT | Performed by: INTERNAL MEDICINE

## 2022-06-27 PROCEDURE — 1125F AMNT PAIN NOTED PAIN PRSNT: CPT | Performed by: INTERNAL MEDICINE

## 2022-06-27 PROCEDURE — 3008F BODY MASS INDEX DOCD: CPT | Performed by: INTERNAL MEDICINE

## 2022-06-27 PROCEDURE — 86480 TB TEST CELL IMMUN MEASURE: CPT

## 2022-06-27 PROCEDURE — 36415 COLL VENOUS BLD VENIPUNCTURE: CPT

## 2022-06-27 PROCEDURE — 85027 COMPLETE CBC AUTOMATED: CPT

## 2022-06-27 PROCEDURE — 3078F DIAST BP <80 MM HG: CPT | Performed by: INTERNAL MEDICINE

## 2022-06-27 NOTE — PATIENT INSTRUCTIONS
1. Cont. Sulfasalazine 1000mg twice a day -     2. Cont.  lelfunomide  20mg a day -    3. Cont.  cimzi 400mg a month. -   4. Return to clinic in 6 months. 5. Labs today and then in 6 months.

## 2022-06-29 LAB
M TB IFN-G CD4+ T-CELLS BLD-ACNC: 0 IU/ML
M TB TUBERC IFN-G BLD QL: NEGATIVE
M TB TUBERC IGNF/MITOGEN IGNF CONTROL: >10 IU/ML
QFT TB1 AG MINUS NIL: 0.04 IU/ML
QFT TB2 AG MINUS NIL: 0.03 IU/ML

## 2022-07-18 ENCOUNTER — NURSE ONLY (OUTPATIENT)
Dept: RHEUMATOLOGY | Facility: CLINIC | Age: 77
End: 2022-07-18
Payer: COMMERCIAL

## 2022-07-18 DIAGNOSIS — M06.00 RHEUMATOID ARTHRITIS WITH NEGATIVE RHEUMATOID FACTOR, INVOLVING UNSPECIFIED SITE (HCC): Primary | ICD-10-CM

## 2022-07-18 NOTE — PROGRESS NOTES
Name and  verified. Patient aware she was to receive injection of Cimza. Injections given on Bilateral Lower Abdomen and patient tolerated well. Possible local side effects discussed with patient. Patient aware to follow up in 4 weeks for next injection and to follow up with provider in 2 month after labs completed.      Future Appointments   Date Time Provider William Vergara   2022  1:30 PM MD CAYLA Burciaga   8/15/2022  1:00 PM [facility] RN RHEUMATOLOGY [facility]

## 2022-08-02 ENCOUNTER — PATIENT MESSAGE (OUTPATIENT)
Dept: INTERNAL MEDICINE CLINIC | Facility: CLINIC | Age: 77
End: 2022-08-02

## 2022-08-02 ENCOUNTER — OFFICE VISIT (OUTPATIENT)
Dept: INTERNAL MEDICINE CLINIC | Facility: CLINIC | Age: 77
End: 2022-08-02
Payer: COMMERCIAL

## 2022-08-02 VITALS
OXYGEN SATURATION: 96 % | WEIGHT: 109 LBS | HEART RATE: 70 BPM | BODY MASS INDEX: 22.88 KG/M2 | TEMPERATURE: 98 F | SYSTOLIC BLOOD PRESSURE: 123 MMHG | HEIGHT: 58 IN | DIASTOLIC BLOOD PRESSURE: 68 MMHG

## 2022-08-02 DIAGNOSIS — E11.9 DIABETES MELLITUS TYPE 2 WITHOUT RETINOPATHY (HCC): ICD-10-CM

## 2022-08-02 DIAGNOSIS — M21.372 LEFT FOOT DROP: ICD-10-CM

## 2022-08-02 DIAGNOSIS — D64.9 CHRONIC ANEMIA: ICD-10-CM

## 2022-08-02 DIAGNOSIS — I10 ESSENTIAL HYPERTENSION: ICD-10-CM

## 2022-08-02 DIAGNOSIS — I25.10 CAD IN NATIVE ARTERY: ICD-10-CM

## 2022-08-02 DIAGNOSIS — Z95.1 S/P CABG X 3: ICD-10-CM

## 2022-08-02 DIAGNOSIS — M47.816 ARTHRITIS, LUMBAR SPINE: ICD-10-CM

## 2022-08-02 DIAGNOSIS — M06.9 RHEUMATOID ARTHRITIS, INVOLVING UNSPECIFIED SITE, UNSPECIFIED WHETHER RHEUMATOID FACTOR PRESENT (HCC): ICD-10-CM

## 2022-08-02 DIAGNOSIS — E78.2 HYPERLIPIDEMIA, MIXED: ICD-10-CM

## 2022-08-02 DIAGNOSIS — Z00.00 MEDICARE ANNUAL WELLNESS VISIT, SUBSEQUENT: Primary | ICD-10-CM

## 2022-08-02 DIAGNOSIS — Z98.890 HISTORY OF LUMBAR DISCECTOMY: ICD-10-CM

## 2022-08-02 DIAGNOSIS — Z12.31 VISIT FOR SCREENING MAMMOGRAM: ICD-10-CM

## 2022-08-02 DIAGNOSIS — M48.061 NEURAL FORAMINAL STENOSIS OF LUMBAR SPINE: ICD-10-CM

## 2022-08-02 DIAGNOSIS — B00.1 RECURRENT COLD SORES: ICD-10-CM

## 2022-08-02 PROCEDURE — 1126F AMNT PAIN NOTED NONE PRSNT: CPT | Performed by: INTERNAL MEDICINE

## 2022-08-02 PROCEDURE — G0439 PPPS, SUBSEQ VISIT: HCPCS | Performed by: INTERNAL MEDICINE

## 2022-08-02 PROCEDURE — 3074F SYST BP LT 130 MM HG: CPT | Performed by: INTERNAL MEDICINE

## 2022-08-02 PROCEDURE — 99397 PER PM REEVAL EST PAT 65+ YR: CPT | Performed by: INTERNAL MEDICINE

## 2022-08-02 PROCEDURE — 3008F BODY MASS INDEX DOCD: CPT | Performed by: INTERNAL MEDICINE

## 2022-08-02 PROCEDURE — 96160 PT-FOCUSED HLTH RISK ASSMT: CPT | Performed by: INTERNAL MEDICINE

## 2022-08-02 PROCEDURE — 3078F DIAST BP <80 MM HG: CPT | Performed by: INTERNAL MEDICINE

## 2022-08-02 RX ORDER — CALCIUM CARBONATE 300MG(750)
1 TABLET,CHEWABLE ORAL DAILY
Qty: 1 TABLET | Refills: 0 | COMMUNITY
Start: 2022-08-02

## 2022-08-02 NOTE — TELEPHONE ENCOUNTER
From: Ely Thomas  To: Ellen Willams MD  Sent: 8/2/2022 5:08 PM CDT  Subject: Dietary Supplement    The dietary supplement that should be added to my prescription list is Magnesium. Right now I am taking 400mg of Triple Magnesium Complex. When this bottle is empty, I will be taking 500mg Magnesium. This is for [facility].      Lemont Meigs

## 2022-08-08 ENCOUNTER — LAB ENCOUNTER (OUTPATIENT)
Dept: LAB | Age: 77
End: 2022-08-08
Attending: INTERNAL MEDICINE
Payer: MEDICARE

## 2022-08-08 DIAGNOSIS — E11.9 DIABETES MELLITUS TYPE 2 WITHOUT RETINOPATHY (HCC): ICD-10-CM

## 2022-08-08 LAB
CHOLEST SERPL-MCNC: 187 MG/DL (ref ?–200)
CREAT UR-SCNC: 100 MG/DL
EST. AVERAGE GLUCOSE BLD GHB EST-MCNC: 111 MG/DL (ref 68–126)
FASTING PATIENT LIPID ANSWER: YES
HBA1C MFR BLD: 5.5 % (ref ?–5.7)
HDLC SERPL-MCNC: 51 MG/DL (ref 40–59)
LDLC SERPL CALC-MCNC: 95 MG/DL (ref ?–100)
MICROALBUMIN UR-MCNC: 9.75 MG/DL
MICROALBUMIN/CREAT 24H UR-RTO: 97.5 UG/MG (ref ?–30)
NONHDLC SERPL-MCNC: 136 MG/DL (ref ?–130)
T4 FREE SERPL-MCNC: 0.8 NG/DL (ref 0.8–1.7)
TRIGL SERPL-MCNC: 244 MG/DL (ref 30–149)
TSI SER-ACNC: 4.21 MIU/ML (ref 0.36–3.74)
VLDLC SERPL CALC-MCNC: 40 MG/DL (ref 0–30)

## 2022-08-08 PROCEDURE — 36415 COLL VENOUS BLD VENIPUNCTURE: CPT

## 2022-08-08 PROCEDURE — 84439 ASSAY OF FREE THYROXINE: CPT

## 2022-08-08 PROCEDURE — 80061 LIPID PANEL: CPT

## 2022-08-08 PROCEDURE — 83036 HEMOGLOBIN GLYCOSYLATED A1C: CPT

## 2022-08-08 PROCEDURE — 82043 UR ALBUMIN QUANTITATIVE: CPT

## 2022-08-08 PROCEDURE — 82570 ASSAY OF URINE CREATININE: CPT

## 2022-08-08 PROCEDURE — 84443 ASSAY THYROID STIM HORMONE: CPT

## 2022-08-10 ENCOUNTER — TELEPHONE (OUTPATIENT)
Dept: INTERNAL MEDICINE CLINIC | Facility: CLINIC | Age: 77
End: 2022-08-10

## 2022-08-15 ENCOUNTER — NURSE ONLY (OUTPATIENT)
Dept: RHEUMATOLOGY | Facility: CLINIC | Age: 77
End: 2022-08-15
Payer: COMMERCIAL

## 2022-08-15 DIAGNOSIS — M06.00 RHEUMATOID ARTHRITIS WITH NEGATIVE RHEUMATOID FACTOR, INVOLVING UNSPECIFIED SITE (HCC): Primary | ICD-10-CM

## 2022-08-23 ENCOUNTER — HOSPITAL ENCOUNTER (OUTPATIENT)
Dept: MAMMOGRAPHY | Facility: HOSPITAL | Age: 77
Discharge: HOME | End: 2022-08-23
Attending: INTERNAL MEDICINE
Payer: MEDICARE

## 2022-08-23 DIAGNOSIS — Z12.31 VISIT FOR SCREENING MAMMOGRAM: ICD-10-CM

## 2022-08-23 PROCEDURE — 77067 SCR MAMMO BI INCL CAD: CPT | Performed by: INTERNAL MEDICINE

## 2022-08-23 PROCEDURE — 77063 BREAST TOMOSYNTHESIS BI: CPT | Performed by: INTERNAL MEDICINE

## 2022-08-25 ENCOUNTER — TELEPHONE (OUTPATIENT)
Dept: INTERNAL MEDICINE CLINIC | Facility: CLINIC | Age: 77
End: 2022-08-25

## 2022-09-01 ENCOUNTER — TELEPHONE (OUTPATIENT)
Dept: INTERNAL MEDICINE CLINIC | Facility: CLINIC | Age: 77
End: 2022-09-01

## 2022-09-01 ENCOUNTER — HOSPITAL ENCOUNTER (OUTPATIENT)
Dept: MAMMOGRAPHY | Facility: HOSPITAL | Age: 77
Discharge: HOME | End: 2022-09-01
Attending: INTERNAL MEDICINE
Payer: MEDICARE

## 2022-09-01 DIAGNOSIS — R92.8 ABNORMAL MAMMOGRAM: ICD-10-CM

## 2022-09-01 PROCEDURE — 77065 DX MAMMO INCL CAD UNI: CPT | Performed by: INTERNAL MEDICINE

## 2022-09-01 PROCEDURE — 77061 BREAST TOMOSYNTHESIS UNI: CPT | Performed by: INTERNAL MEDICINE

## 2022-09-01 NOTE — TELEPHONE ENCOUNTER
Results noted:  9/1/22--additional views L mammo (previous skin fold)--results ok. Next routine mammogram in 12 mo.

## 2022-09-14 ENCOUNTER — NURSE ONLY (OUTPATIENT)
Dept: RHEUMATOLOGY | Facility: CLINIC | Age: 77
End: 2022-09-14
Payer: COMMERCIAL

## 2022-09-14 DIAGNOSIS — M06.00 SERONEGATIVE RHEUMATOID ARTHRITIS (HCC): Primary | ICD-10-CM

## 2022-09-14 PROCEDURE — 96372 THER/PROPH/DIAG INJ SC/IM: CPT | Performed by: INTERNAL MEDICINE

## 2022-09-14 NOTE — PROGRESS NOTES
Name and  verified. Pt aware she was to receive injection of Cimza. Injections given and pt tolerated well. Future Appointments   Date Time Provider William Vergara   10/12/2022  1:00 PM  [facility] RN RHEUMATOLOGY  [facility] [address]   2/3/2023  1:30 PM Saundra Carrion MD [facility]     Summary:  1. Cont. Sulfasalazine 1000mg twice a day -     2. Cont.  lelfunomide  20mg a day -    3. Cont.  cimzi 400mg a month. -   4. Return to clinic in 6 months. 5. Labs today and then in 6 months.       Tyrone Snider MD  2022   3:49 PM  - [place]ed [place] Carolyn Medrano

## 2022-09-20 NOTE — TELEPHONE ENCOUNTER
Last filled: 9/22/21 #90 tab with 3 refills   LOV: 6/27/22  Future Appointments   Date Time Provider William Vergara   10/12/2022  1:00 PM [facility] RN RHEUMATOLOGY 2014 [facility]   2/3/2023  1:30 [facility]     Labs:   Component      Latest Ref Rng & Units 6/27/2022   Glucose      70 - 99 mg/dL 79   Sodium      136 - 145 mmol/L 139   Potassium      3.5 - 5.1 mmol/L 4.4   Chloride      98 - 112 mmol/L 107   Carbon Dioxide, Total      21.0 - 32.0 mmol/L 28.0   ANION GAP      0 - 18 mmol/L 4   BUN      7 - 18 mg/dL 14   CREATININE      0.55 - 1.02 mg/dL 0.78   BUN/CREATININE RATIO      10.0 - 20.0 17.9   CALCIUM      8.5 - 10.1 mg/dL 8.8   CALCULATED OSMOLALITY      275 - 295 mOsm/kg 287   eGFR NON-AFR. AMERICAN      >=60 75   eGFR       >=60 86   ALT (SGPT)      13 - 56 U/L 17   AST (SGOT)      15 - 37 U/L 28   ALKALINE PHOSPHATASE      55 - 142 U/L 51 (L)   Total Bilirubin      0.1 - 2.0 mg/dL 0.3   PROTEIN, TOTAL      6.4 - 8.2 g/dL 7.3   Albumin      3.4 - 5.0 g/dL 3.5   Globulin      2.8 - 4.4 g/dL 3.8   A/G Ratio      1.0 - 2.0 0.9 (L)   Patient Fasting for CMP? No   WBC      4.0 - 11.0 x10(3) uL 7.8   RBC      3.80 - 5.30 x10(6)uL 3.64 (L)   Hemoglobin      12.0 - 16.0 g/dL 11.1 (L)   Hematocrit      35.0 - 48.0 % 35.5   MCV      80.0 - 100.0 fL 97.5   MCH      26.0 - 34.0 pg 30.5   MCHC      31.0 - 37.0 g/dL 31.3   RDW      11.0 - 15.0 % 12.7   RDW-SD      35.1 - 46.3 fL 45.6   Platelet Count      779.6 - 450.0 10(3)uL 250.0   Quantiferon TB NIL      IU/mL 0.00   Quantiferon-TB1 Minus NIL      IU/mL 0.04   Quantiferon-TB2 Minus NIL      IU/mL 0.03   Quantiferon TB Mitogen minus NIL      IU/mL >10.00   QTB. RESULT      Negative Negative   C-REACTIVE PROTEIN      <0.30 mg/dL 0.34 (H)   SED RATE      0 - 30 mm/Hr 23   Summary:  1. Cont. Sulfasalazine 1000mg twice a day -     2. Cont.  lelfunomide  20mg a day -    3. Cont.  cimzi 400mg a month. -   4. Return to clinic in 6 months. 5. Labs today and then in 6 months.       Carmen Goetz MD  6/27/2022   3:49 PM  - [place] Epic

## 2022-09-21 RX ORDER — LEFLUNOMIDE 20 MG/1
20 TABLET ORAL DAILY
Qty: 90 TABLET | Refills: 1 | Status: SHIPPED | OUTPATIENT
Start: 2022-09-21 | End: 2023-01-13

## 2022-10-12 ENCOUNTER — NURSE ONLY (OUTPATIENT)
Dept: RHEUMATOLOGY | Facility: CLINIC | Age: 77
End: 2022-10-12
Payer: COMMERCIAL

## 2022-10-12 DIAGNOSIS — M06.00 RHEUMATOID ARTHRITIS WITH NEGATIVE RHEUMATOID FACTOR, INVOLVING UNSPECIFIED SITE (HCC): Primary | ICD-10-CM

## 2022-10-12 PROCEDURE — 96372 THER/PROPH/DIAG INJ SC/IM: CPT | Performed by: INTERNAL MEDICINE

## 2022-10-24 ENCOUNTER — TELEPHONE (OUTPATIENT)
Dept: RHEUMATOLOGY | Facility: CLINIC | Age: 77
End: 2022-10-24

## 2022-10-24 ENCOUNTER — OFFICE VISIT (OUTPATIENT)
Dept: INTERNAL MEDICINE CLINIC | Facility: CLINIC | Age: 77
End: 2022-10-24
Payer: COMMERCIAL

## 2022-10-24 ENCOUNTER — LAB ENCOUNTER (OUTPATIENT)
Dept: LAB | Age: 77
End: 2022-10-24
Attending: INTERNAL MEDICINE
Payer: MEDICARE

## 2022-10-24 ENCOUNTER — LAB ENCOUNTER (OUTPATIENT)
Dept: LAB | Facility: HOSPITAL | Age: 77
End: 2022-10-24
Attending: INTERNAL MEDICINE
Payer: MEDICARE

## 2022-10-24 VITALS
TEMPERATURE: 98 F | BODY MASS INDEX: 23.43 KG/M2 | WEIGHT: 111.63 LBS | RESPIRATION RATE: 16 BRPM | HEART RATE: 65 BPM | OXYGEN SATURATION: 97 % | DIASTOLIC BLOOD PRESSURE: 56 MMHG | HEIGHT: 58 IN | SYSTOLIC BLOOD PRESSURE: 126 MMHG

## 2022-10-24 DIAGNOSIS — R19.7 DIARRHEA, UNSPECIFIED TYPE: ICD-10-CM

## 2022-10-24 DIAGNOSIS — R19.7 DIARRHEA, UNSPECIFIED TYPE: Primary | ICD-10-CM

## 2022-10-24 LAB
ALBUMIN SERPL-MCNC: 3.6 G/DL (ref 3.4–5)
ALBUMIN/GLOB SERPL: 0.9 {RATIO} (ref 1–2)
ALP LIVER SERPL-CCNC: 63 U/L
ALT SERPL-CCNC: 24 U/L
ANION GAP SERPL CALC-SCNC: 4 MMOL/L (ref 0–18)
AST SERPL-CCNC: 22 U/L (ref 15–37)
BASOPHILS # BLD AUTO: 0.11 X10(3) UL (ref 0–0.2)
BASOPHILS NFR BLD AUTO: 1 %
BILIRUB SERPL-MCNC: 0.3 MG/DL (ref 0.1–2)
BUN BLD-MCNC: 23 MG/DL (ref 7–18)
BUN/CREAT SERPL: 22.1 (ref 10–20)
CALCIUM BLD-MCNC: 9.8 MG/DL (ref 8.5–10.1)
CHLORIDE SERPL-SCNC: 112 MMOL/L (ref 98–112)
CO2 SERPL-SCNC: 26 MMOL/L (ref 21–32)
CREAT BLD-MCNC: 1.04 MG/DL
CRYPTOSP AG STL QL IA: NEGATIVE
DEPRECATED RDW RBC AUTO: 47.8 FL (ref 35.1–46.3)
EOSINOPHIL # BLD AUTO: 0.53 X10(3) UL (ref 0–0.7)
EOSINOPHIL NFR BLD AUTO: 4.8 %
ERYTHROCYTE [DISTWIDTH] IN BLOOD BY AUTOMATED COUNT: 13.2 % (ref 11–15)
FASTING STATUS PATIENT QL REPORTED: NO
G LAMBLIA AG STL QL IA: NEGATIVE
GFR SERPLBLD BASED ON 1.73 SQ M-ARVRAT: 56 ML/MIN/1.73M2 (ref 60–?)
GLOBULIN PLAS-MCNC: 4 G/DL (ref 2.8–4.4)
GLUCOSE BLD-MCNC: 91 MG/DL (ref 70–99)
HCT VFR BLD AUTO: 36 %
HGB BLD-MCNC: 11.3 G/DL
IMM GRANULOCYTES # BLD AUTO: 0.04 X10(3) UL (ref 0–1)
IMM GRANULOCYTES NFR BLD: 0.4 %
LYMPHOCYTES # BLD AUTO: 3.62 X10(3) UL (ref 1–4)
LYMPHOCYTES NFR BLD AUTO: 32.7 %
MCH RBC QN AUTO: 31 PG (ref 26–34)
MCHC RBC AUTO-ENTMCNC: 31.4 G/DL (ref 31–37)
MCV RBC AUTO: 98.9 FL
MONOCYTES # BLD AUTO: 1.13 X10(3) UL (ref 0.1–1)
MONOCYTES NFR BLD AUTO: 10.2 %
NEUTROPHILS # BLD AUTO: 5.65 X10 (3) UL (ref 1.5–7.7)
NEUTROPHILS # BLD AUTO: 5.65 X10(3) UL (ref 1.5–7.7)
NEUTROPHILS NFR BLD AUTO: 50.9 %
OSMOLALITY SERPL CALC.SUM OF ELEC: 297 MOSM/KG (ref 275–295)
PLATELET # BLD AUTO: 313 10(3)UL (ref 150–450)
POTASSIUM SERPL-SCNC: 5 MMOL/L (ref 3.5–5.1)
PROT SERPL-MCNC: 7.6 G/DL (ref 6.4–8.2)
RBC # BLD AUTO: 3.64 X10(6)UL
SODIUM SERPL-SCNC: 142 MMOL/L (ref 136–145)
TSI SER-ACNC: 2.64 MIU/ML (ref 0.36–3.74)
WBC # BLD AUTO: 11.1 X10(3) UL (ref 4–11)

## 2022-10-24 PROCEDURE — 99213 OFFICE O/P EST LOW 20 MIN: CPT | Performed by: INTERNAL MEDICINE

## 2022-10-24 PROCEDURE — 87493 C DIFF AMPLIFIED PROBE: CPT

## 2022-10-24 PROCEDURE — 80053 COMPREHEN METABOLIC PANEL: CPT

## 2022-10-24 PROCEDURE — 87045 FECES CULTURE AEROBIC BACT: CPT

## 2022-10-24 PROCEDURE — 3078F DIAST BP <80 MM HG: CPT | Performed by: INTERNAL MEDICINE

## 2022-10-24 PROCEDURE — 85025 COMPLETE CBC W/AUTO DIFF WBC: CPT

## 2022-10-24 PROCEDURE — 87329 GIARDIA AG IA: CPT

## 2022-10-24 PROCEDURE — 36415 COLL VENOUS BLD VENIPUNCTURE: CPT

## 2022-10-24 PROCEDURE — 87427 SHIGA-LIKE TOXIN AG IA: CPT

## 2022-10-24 PROCEDURE — 84443 ASSAY THYROID STIM HORMONE: CPT

## 2022-10-24 PROCEDURE — 3074F SYST BP LT 130 MM HG: CPT | Performed by: INTERNAL MEDICINE

## 2022-10-24 PROCEDURE — 87272 CRYPTOSPORIDIUM AG IF: CPT

## 2022-10-24 PROCEDURE — 1126F AMNT PAIN NOTED NONE PRSNT: CPT | Performed by: INTERNAL MEDICINE

## 2022-10-24 PROCEDURE — 87046 STOOL CULTR AEROBIC BACT EA: CPT

## 2022-10-24 PROCEDURE — 3008F BODY MASS INDEX DOCD: CPT | Performed by: INTERNAL MEDICINE

## 2022-10-24 RX ORDER — ASPIRIN 81 MG/1
81 TABLET ORAL ONCE
COMMUNITY

## 2022-10-24 RX ORDER — LISINOPRIL 2.5 MG/1
2.5 TABLET ORAL DAILY
Qty: 90 TABLET | Refills: 3 | Status: SHIPPED | OUTPATIENT
Start: 2022-10-24

## 2022-10-24 RX ORDER — SIMVASTATIN 40 MG
TABLET ORAL
Qty: 90 TABLET | Refills: 3 | Status: SHIPPED | OUTPATIENT
Start: 2022-10-24

## 2022-10-25 ENCOUNTER — TELEPHONE (OUTPATIENT)
Dept: INTERNAL MEDICINE CLINIC | Facility: CLINIC | Age: 77
End: 2022-10-25

## 2022-10-25 LAB — C DIFF TOX B STL QL: NEGATIVE

## 2022-10-26 NOTE — TELEPHONE ENCOUNTER
To nursing, please tell patient stool culture result is now back and is negative. The next step is for her to contact her GI doctor, Dr. Jaja Menon, for his recommendations. Thanks.

## 2022-10-28 ENCOUNTER — TELEPHONE (OUTPATIENT)
Dept: INTERNAL MEDICINE CLINIC | Facility: CLINIC | Age: 77
End: 2022-10-28

## 2022-10-28 NOTE — TELEPHONE ENCOUNTER
Returned patient call and helped her schedule cimzia.    Future Appointments   Date Time Provider William Ursula   11/14/2022  1:30 PM [facility]

## 2022-11-13 RX ORDER — SIMVASTATIN 40 MG
TABLET ORAL
Qty: 90 TABLET | Refills: 3 | OUTPATIENT
Start: 2022-11-13

## 2022-11-13 RX ORDER — LISINOPRIL 2.5 MG/1
TABLET ORAL
Qty: 90 TABLET | Refills: 3 | OUTPATIENT
Start: 2022-11-13

## 2022-11-14 ENCOUNTER — NURSE ONLY (OUTPATIENT)
Dept: RHEUMATOLOGY | Facility: CLINIC | Age: 77
End: 2022-11-14
Payer: COMMERCIAL

## 2022-11-14 DIAGNOSIS — M06.00 RHEUMATOID ARTHRITIS WITH NEGATIVE RHEUMATOID FACTOR, INVOLVING UNSPECIFIED SITE (HCC): Primary | ICD-10-CM

## 2022-11-14 NOTE — PROGRESS NOTES
Name and  verified. Patient aware she was to receive injection of Cimza. Injections given on Bilateral Lower Abdomen subcutaneous and patient tolerated well. Possible local side effects discussed with patient. Patient aware to follow up in 4 weeks for next injection and to follow up with provider in 1 month after labs completed.

## 2022-11-27 ENCOUNTER — PATIENT MESSAGE (OUTPATIENT)
Dept: INTERNAL MEDICINE CLINIC | Facility: CLINIC | Age: 77
End: 2022-11-27

## 2022-11-28 NOTE — TELEPHONE ENCOUNTER
From: Ely Thomas  To: Ellen Willams MD  Sent: 11/27/2022 2:14 PM CST  Subject: Lisinopril & Simvastatin    I looks like there have been more than one request for Lisinopril and Simvastatin. Do you need to cancel one of the requests in order for the refill to go through?

## 2022-12-12 ENCOUNTER — PATIENT MESSAGE (OUTPATIENT)
Dept: INTERNAL MEDICINE CLINIC | Facility: CLINIC | Age: 77
End: 2022-12-12

## 2022-12-12 NOTE — TELEPHONE ENCOUNTER
From: Leonor Catalan  To: Prsaanna Pérez MD  Sent: 12/12/2022 1:20 PM CST  Subject: Ingrown toenail    My right big toe is very painful. I think I have an ingrown toenail.  Would you please recommend a podiatrist?

## 2022-12-13 ENCOUNTER — OFFICE VISIT (OUTPATIENT)
Dept: PODIATRY CLINIC | Facility: CLINIC | Age: 77
End: 2022-12-13
Payer: COMMERCIAL

## 2022-12-13 DIAGNOSIS — L60.0 INGROWN TOENAIL OF RIGHT FOOT: Primary | ICD-10-CM

## 2022-12-13 PROCEDURE — 99203 OFFICE O/P NEW LOW 30 MIN: CPT | Performed by: PODIATRIST

## 2022-12-13 PROCEDURE — 1126F AMNT PAIN NOTED NONE PRSNT: CPT | Performed by: PODIATRIST

## 2022-12-16 ENCOUNTER — NURSE ONLY (OUTPATIENT)
Dept: RHEUMATOLOGY | Facility: CLINIC | Age: 77
End: 2022-12-16
Payer: COMMERCIAL

## 2022-12-16 ENCOUNTER — TELEPHONE (OUTPATIENT)
Dept: RHEUMATOLOGY | Facility: CLINIC | Age: 77
End: 2022-12-16

## 2022-12-16 DIAGNOSIS — M06.00 RHEUMATOID ARTHRITIS WITH NEGATIVE RHEUMATOID FACTOR, INVOLVING UNSPECIFIED SITE (HCC): Primary | ICD-10-CM

## 2022-12-16 PROCEDURE — 96372 THER/PROPH/DIAG INJ SC/IM: CPT | Performed by: INTERNAL MEDICINE

## 2022-12-16 NOTE — PROGRESS NOTES
Verified name and . Patient aware receiving Cimzia injection. Injections given. Patient tolerated well. Patient aware to follow up in 1 month for next injection.     Future Appointments   Date Time Provider William Vergara   2023  2:00 PM [facility] RN RHEUMATOLOGY  [facility]   2/3/2023 11:00 AM MD CAYLA Quintero As[place]

## 2022-12-16 NOTE — TELEPHONE ENCOUNTER
Future Appointments   Date Time Provider William Vergara   1/13/2023  3:30 PM Duong Thomas MD 2014 [facility]   2/3/2023 11:00 AM MD CAYLA Clay

## 2022-12-16 NOTE — TELEPHONE ENCOUNTER
Pt was seen in the office today. Pt scheduled a  Nurse visit for an injection on 1-13-23 and was told that she also has to see the doctor. Pt would like to know if she can see the doctor on the same date. There are no available appointments.

## 2022-12-18 ENCOUNTER — PATIENT MESSAGE (OUTPATIENT)
Dept: RHEUMATOLOGY | Facility: CLINIC | Age: 77
End: 2022-12-18

## 2022-12-19 ENCOUNTER — PATIENT MESSAGE (OUTPATIENT)
Dept: RHEUMATOLOGY | Facility: CLINIC | Age: 77
End: 2022-12-19

## 2022-12-19 NOTE — TELEPHONE ENCOUNTER
From: Kanchan July  To: Guanaco Wilkinson MD  Sent: 12/19/2022 8:27 AM CST  Subject: January Appointment    I made an appointment through [facility] to see you and have a Cimzia shot on 1/13/23 at 3:30pm. Then yesterday I received a message asking if Yareli macke to move up to 1/9/23 at 11:00. Unfortunately I said yes. Later last night I realized that would be less than 28 days. Can I switch back to 1/13/23? I want to be sure my insurance will pay for most of it. Thank you.

## 2022-12-19 NOTE — TELEPHONE ENCOUNTER
From: Chandler Pinto  To: Edith Cosby  Sent: 12/16/2022 5:02 PM CST  Subject: Follow up and Hernan Rose,    I change your appointment to 3:30 PM on 01/13/2023 so that you can see Dr. Reji Ellis and get your Cimzia at the same visit.

## 2022-12-27 RX ORDER — GABAPENTIN 600 MG/1
TABLET ORAL
Qty: 180 TABLET | Refills: 3 | Status: SHIPPED | OUTPATIENT
Start: 2022-12-27

## 2022-12-27 RX ORDER — HYDROCHLOROTHIAZIDE 12.5 MG/1
TABLET ORAL
Qty: 90 TABLET | Refills: 3 | Status: SHIPPED | OUTPATIENT
Start: 2022-12-27

## 2023-01-13 ENCOUNTER — OFFICE VISIT (OUTPATIENT)
Dept: RHEUMATOLOGY | Facility: CLINIC | Age: 78
End: 2023-01-13

## 2023-01-13 VITALS
DIASTOLIC BLOOD PRESSURE: 58 MMHG | SYSTOLIC BLOOD PRESSURE: 142 MMHG | WEIGHT: 112.38 LBS | RESPIRATION RATE: 16 BRPM | HEART RATE: 73 BPM | HEIGHT: 58 IN | BODY MASS INDEX: 23.59 KG/M2

## 2023-01-13 DIAGNOSIS — Z51.81 THERAPEUTIC DRUG MONITORING: ICD-10-CM

## 2023-01-13 DIAGNOSIS — M06.00 RHEUMATOID ARTHRITIS WITH NEGATIVE RHEUMATOID FACTOR, INVOLVING UNSPECIFIED SITE (HCC): Primary | ICD-10-CM

## 2023-01-13 PROCEDURE — 3078F DIAST BP <80 MM HG: CPT | Performed by: INTERNAL MEDICINE

## 2023-01-13 PROCEDURE — 3008F BODY MASS INDEX DOCD: CPT | Performed by: INTERNAL MEDICINE

## 2023-01-13 PROCEDURE — 1125F AMNT PAIN NOTED PAIN PRSNT: CPT | Performed by: INTERNAL MEDICINE

## 2023-01-13 PROCEDURE — 3077F SYST BP >= 140 MM HG: CPT | Performed by: INTERNAL MEDICINE

## 2023-01-13 PROCEDURE — 96372 THER/PROPH/DIAG INJ SC/IM: CPT | Performed by: INTERNAL MEDICINE

## 2023-01-13 PROCEDURE — 99214 OFFICE O/P EST MOD 30 MIN: CPT | Performed by: INTERNAL MEDICINE

## 2023-01-13 RX ORDER — SULFASALAZINE 500 MG/1
1000 TABLET ORAL 2 TIMES DAILY
Qty: 360 TABLET | Refills: 1 | Status: SHIPPED | OUTPATIENT
Start: 2023-01-13

## 2023-01-13 RX ORDER — LEFLUNOMIDE 20 MG/1
20 TABLET ORAL DAILY
Qty: 90 TABLET | Refills: 1 | Status: SHIPPED | OUTPATIENT
Start: 2023-01-13

## 2023-01-13 NOTE — PROGRESS NOTES
Name and  verified. Patient aware she was to receive injection of Cimza. Injections given B/L Lower Abdomen and patient tolerated well. Possible local side effects discussed with patient. Patient aware to follow up in 4 weeks for next injection and to follow up with provider in 6 month after labs completed.

## 2023-01-18 ENCOUNTER — TELEPHONE (OUTPATIENT)
Dept: INTERNAL MEDICINE CLINIC | Facility: CLINIC | Age: 78
End: 2023-01-18

## 2023-02-06 PROBLEM — G57.02 COMMON PERONEAL NEUROPATHY OF LEFT LOWER EXTREMITY: Status: ACTIVE | Noted: 2023-02-06

## 2023-02-06 RX ORDER — METOPROLOL TARTRATE 50 MG/1
TABLET, FILM COATED ORAL
Qty: 180 TABLET | Refills: 3 | OUTPATIENT
Start: 2023-02-06

## 2023-02-07 ENCOUNTER — LAB ENCOUNTER (OUTPATIENT)
Dept: LAB | Age: 78
End: 2023-02-07
Attending: INTERNAL MEDICINE
Payer: MEDICARE

## 2023-02-07 ENCOUNTER — OFFICE VISIT (OUTPATIENT)
Dept: INTERNAL MEDICINE CLINIC | Facility: CLINIC | Age: 78
End: 2023-02-07

## 2023-02-07 VITALS
HEIGHT: 58 IN | BODY MASS INDEX: 23.3 KG/M2 | SYSTOLIC BLOOD PRESSURE: 121 MMHG | WEIGHT: 111 LBS | DIASTOLIC BLOOD PRESSURE: 70 MMHG | HEART RATE: 88 BPM

## 2023-02-07 DIAGNOSIS — Z95.1 S/P CABG X 3: ICD-10-CM

## 2023-02-07 DIAGNOSIS — I10 ESSENTIAL HYPERTENSION: Primary | ICD-10-CM

## 2023-02-07 DIAGNOSIS — E11.9 DIABETES MELLITUS TYPE 2 WITHOUT RETINOPATHY (HCC): ICD-10-CM

## 2023-02-07 DIAGNOSIS — E03.9 HYPOTHYROIDISM, UNSPECIFIED TYPE: ICD-10-CM

## 2023-02-07 DIAGNOSIS — K80.21 CALCULUS OF GALLBLADDER WITH BILIARY OBSTRUCTION BUT WITHOUT CHOLECYSTITIS: ICD-10-CM

## 2023-02-07 DIAGNOSIS — Z51.81 THERAPEUTIC DRUG MONITORING: ICD-10-CM

## 2023-02-07 DIAGNOSIS — Z12.31 VISIT FOR SCREENING MAMMOGRAM: ICD-10-CM

## 2023-02-07 DIAGNOSIS — M06.00 RHEUMATOID ARTHRITIS WITH NEGATIVE RHEUMATOID FACTOR, INVOLVING UNSPECIFIED SITE (HCC): ICD-10-CM

## 2023-02-07 DIAGNOSIS — Z98.890 HISTORY OF LUMBAR DISCECTOMY: ICD-10-CM

## 2023-02-07 DIAGNOSIS — E78.2 HYPERLIPIDEMIA, MIXED: ICD-10-CM

## 2023-02-07 DIAGNOSIS — E78.2 MIXED HYPERLIPIDEMIA: Primary | ICD-10-CM

## 2023-02-07 DIAGNOSIS — N18.31 STAGE 3A CHRONIC KIDNEY DISEASE (HCC): ICD-10-CM

## 2023-02-07 DIAGNOSIS — H61.23 BILATERAL IMPACTED CERUMEN: ICD-10-CM

## 2023-02-07 LAB
ALBUMIN SERPL-MCNC: 3.7 G/DL (ref 3.4–5)
ALBUMIN/GLOB SERPL: 0.9 {RATIO} (ref 1–2)
ALP LIVER SERPL-CCNC: 54 U/L
ALT SERPL-CCNC: 18 U/L
ANION GAP SERPL CALC-SCNC: 8 MMOL/L (ref 0–18)
AST SERPL-CCNC: 18 U/L (ref 15–37)
BILIRUB SERPL-MCNC: 0.3 MG/DL (ref 0.1–2)
BUN BLD-MCNC: 33 MG/DL (ref 7–18)
BUN/CREAT SERPL: 30.3 (ref 10–20)
CALCIUM BLD-MCNC: 9.8 MG/DL (ref 8.5–10.1)
CHLORIDE SERPL-SCNC: 108 MMOL/L (ref 98–112)
CHOLEST SERPL-MCNC: 177 MG/DL (ref ?–200)
CO2 SERPL-SCNC: 25 MMOL/L (ref 21–32)
CREAT BLD-MCNC: 1.09 MG/DL
CRP SERPL-MCNC: <0.29 MG/DL (ref ?–0.3)
DEPRECATED RDW RBC AUTO: 45.8 FL (ref 35.1–46.3)
ERYTHROCYTE [DISTWIDTH] IN BLOOD BY AUTOMATED COUNT: 12.8 % (ref 11–15)
ERYTHROCYTE [SEDIMENTATION RATE] IN BLOOD: 19 MM/HR
EST. AVERAGE GLUCOSE BLD GHB EST-MCNC: 123 MG/DL (ref 68–126)
FASTING PATIENT LIPID ANSWER: YES
FASTING STATUS PATIENT QL REPORTED: YES
GFR SERPLBLD BASED ON 1.73 SQ M-ARVRAT: 53 ML/MIN/1.73M2 (ref 60–?)
GLOBULIN PLAS-MCNC: 3.9 G/DL (ref 2.8–4.4)
GLUCOSE BLD-MCNC: 157 MG/DL (ref 70–99)
HBA1C MFR BLD: 5.9 % (ref ?–5.7)
HCT VFR BLD AUTO: 35.2 %
HDLC SERPL-MCNC: 63 MG/DL (ref 40–59)
HGB BLD-MCNC: 11.2 G/DL
LDLC SERPL CALC-MCNC: 79 MG/DL (ref ?–100)
MCH RBC QN AUTO: 31.2 PG (ref 26–34)
MCHC RBC AUTO-ENTMCNC: 31.8 G/DL (ref 31–37)
MCV RBC AUTO: 98.1 FL
NONHDLC SERPL-MCNC: 114 MG/DL (ref ?–130)
OSMOLALITY SERPL CALC.SUM OF ELEC: 303 MOSM/KG (ref 275–295)
PLATELET # BLD AUTO: 297 10(3)UL (ref 150–450)
POTASSIUM SERPL-SCNC: 5 MMOL/L (ref 3.5–5.1)
PROT SERPL-MCNC: 7.6 G/DL (ref 6.4–8.2)
RBC # BLD AUTO: 3.59 X10(6)UL
SODIUM SERPL-SCNC: 141 MMOL/L (ref 136–145)
TRIGL SERPL-MCNC: 214 MG/DL (ref 30–149)
VLDLC SERPL CALC-MCNC: 34 MG/DL (ref 0–30)
WBC # BLD AUTO: 8.6 X10(3) UL (ref 4–11)

## 2023-02-07 PROCEDURE — 3008F BODY MASS INDEX DOCD: CPT | Performed by: INTERNAL MEDICINE

## 2023-02-07 PROCEDURE — 36415 COLL VENOUS BLD VENIPUNCTURE: CPT | Performed by: INTERNAL MEDICINE

## 2023-02-07 PROCEDURE — 85652 RBC SED RATE AUTOMATED: CPT

## 2023-02-07 PROCEDURE — 83036 HEMOGLOBIN GLYCOSYLATED A1C: CPT | Performed by: INTERNAL MEDICINE

## 2023-02-07 PROCEDURE — 80061 LIPID PANEL: CPT

## 2023-02-07 PROCEDURE — 99214 OFFICE O/P EST MOD 30 MIN: CPT | Performed by: INTERNAL MEDICINE

## 2023-02-07 PROCEDURE — 86140 C-REACTIVE PROTEIN: CPT

## 2023-02-07 PROCEDURE — 3074F SYST BP LT 130 MM HG: CPT | Performed by: INTERNAL MEDICINE

## 2023-02-07 PROCEDURE — 3078F DIAST BP <80 MM HG: CPT | Performed by: INTERNAL MEDICINE

## 2023-02-07 PROCEDURE — 1126F AMNT PAIN NOTED NONE PRSNT: CPT | Performed by: INTERNAL MEDICINE

## 2023-02-07 PROCEDURE — 80053 COMPREHEN METABOLIC PANEL: CPT

## 2023-02-07 PROCEDURE — 85027 COMPLETE CBC AUTOMATED: CPT

## 2023-02-08 RX ORDER — LISINOPRIL 2.5 MG/1
TABLET ORAL
Qty: 90 TABLET | Refills: 3 | OUTPATIENT
Start: 2023-02-08

## 2023-02-08 RX ORDER — LISINOPRIL 2.5 MG/1
2.5 TABLET ORAL DAILY
Qty: 90 TABLET | Refills: 3 | Status: CANCELLED | OUTPATIENT
Start: 2023-02-08

## 2023-02-09 ENCOUNTER — PATIENT MESSAGE (OUTPATIENT)
Dept: INTERNAL MEDICINE CLINIC | Facility: CLINIC | Age: 78
End: 2023-02-09

## 2023-02-09 RX ORDER — LISINOPRIL 2.5 MG/1
2.5 TABLET ORAL DAILY
Qty: 90 TABLET | Refills: 3 | Status: CANCELLED | OUTPATIENT
Start: 2023-02-09

## 2023-02-10 ENCOUNTER — NURSE ONLY (OUTPATIENT)
Dept: RHEUMATOLOGY | Facility: CLINIC | Age: 78
End: 2023-02-10

## 2023-02-10 DIAGNOSIS — M06.00 RHEUMATOID ARTHRITIS WITH NEGATIVE RHEUMATOID FACTOR, INVOLVING UNSPECIFIED SITE (HCC): Primary | ICD-10-CM

## 2023-02-10 PROCEDURE — 96372 THER/PROPH/DIAG INJ SC/IM: CPT | Performed by: INTERNAL MEDICINE

## 2023-02-10 RX ORDER — LISINOPRIL 2.5 MG/1
2.5 TABLET ORAL DAILY
Qty: 90 TABLET | Refills: 3 | Status: SHIPPED | OUTPATIENT
Start: 2023-02-10

## 2023-02-10 NOTE — PROGRESS NOTES
Name and  verified. Pt aware she was to receive injection of Cimza. Injections given Bilateral Lower Abdomen subcutaneous and pt tolerated well. Possible local side effects discussed with pt. Pt aware to follow up in 4 weeks for next injection and to follow up with provider in 4 month after labs completed.      Future Appointments   Date Time Provider William Ursula   2023  1:00 PM Radha Barlow MD [address] [facility]   3/10/2023  2:00 PM [facility] RN RHEUMATOLOGY  [facility]   2023  2:00 PM Jovanny Naidu MD [facility]   8/15/2023  2:00 PM Jovanny Naidu MD [facility]   2023 12:40 PM [address]

## 2023-03-02 ENCOUNTER — PATIENT MESSAGE (OUTPATIENT)
Dept: INTERNAL MEDICINE CLINIC | Facility: CLINIC | Age: 78
End: 2023-03-02

## 2023-03-03 ENCOUNTER — OFFICE VISIT (OUTPATIENT)
Dept: OTOLARYNGOLOGY | Facility: CLINIC | Age: 78
End: 2023-03-03

## 2023-03-03 VITALS — WEIGHT: 111 LBS | BODY MASS INDEX: 23.3 KG/M2 | HEIGHT: 58 IN

## 2023-03-03 DIAGNOSIS — H61.23 BILATERAL IMPACTED CERUMEN: Primary | ICD-10-CM

## 2023-03-03 PROCEDURE — 99202 OFFICE O/P NEW SF 15 MIN: CPT | Performed by: OTOLARYNGOLOGY

## 2023-03-03 PROCEDURE — 3008F BODY MASS INDEX DOCD: CPT | Performed by: OTOLARYNGOLOGY

## 2023-03-03 RX ORDER — SIMVASTATIN 40 MG
TABLET ORAL
Qty: 90 TABLET | Refills: 2 | Status: CANCELLED | OUTPATIENT
Start: 2023-03-03

## 2023-03-03 NOTE — TELEPHONE ENCOUNTER
Refill passed per [place] [employer], [facility] protocol - prescribed by another provider    Rx Pended, authorize if appropriate    Requested Prescriptions   Pending Prescriptions Disp Refills    simvastatin 40 MG Oral Tab 90 tablet 2     Sig: TAKE 1 TABLET BY MOUTH AT  NIGHT       Cholesterol Medication Protocol Passed - 3/3/2023  4:36 PM        Passed - ALT in past 12 months        Passed - LDL in past 12 months        Passed - Last ALT < 80     Lab Results   Component Value Date    ALT 18 02/07/2023             Passed - Last LDL < 130     Lab Results   Component Value Date    LDL 79 02/07/2023             Passed - In person appointment or virtual visit in the past 12 mos or appointment in next 3 mos     Recent Outpatient Visits              Today Bilateral impacted cerumen    [address], [employer], Claudia Valenzuela MD    Office Visit    3 weeks ago Rheumatoid arthritis with negative rheumatoid factor, involving unspecified site [facility])    [address], [employer], [place]    Nurse Only    3 weeks ago Essential hypertension    Luis F Mchugh MD    Office Visit    1 month ago Rheumatoid arthritis with negative rheumatoid factor, involving unspecified site [facility])    [address], [employer], Chyna Chaves MD    Office Visit    2 months ago Rheumatoid arthritis with negative rheumatoid factor, involving unspecified site [facility])    [address], [employer], [place]    Nurse Only          Future Appointments         Provider Department Appt Notes    [facility], [place] cimzia injection    In 2 months Berto Garrett MD [address], [address] [facility] [place] A1C    In 3 months Sandra Ramesh MD [address], 59 [facility]     In 5 months Berto Garrett MD [facility], [facility] Annual checkup    In 5 months [facility] OF Critical access hospital [address] [place] [address] Annual breast checkup

## 2023-03-04 RX ORDER — SIMVASTATIN 40 MG
40 TABLET ORAL NIGHTLY
Qty: 90 TABLET | Refills: 2 | Status: SHIPPED | OUTPATIENT
Start: 2023-03-04

## 2023-03-05 NOTE — TELEPHONE ENCOUNTER
Refill passed per [place] [employer] protocol. Requested Prescriptions   Pending Prescriptions Disp Refills    simvastatin 40 MG Oral Tab 90 tablet 3     Sig: Take 1 tablet (40 mg total) by mouth nightly.        Cholesterol Medication Protocol Passed - 3/4/2023  7:32 PM        Passed - ALT in past 12 months        Passed - LDL in past 12 months        Passed - Last ALT < 80     Lab Results   Component Value Date    ALT 18 02/07/2023             Passed - Last LDL < 130     Lab Results   Component Value Date    LDL 79 02/07/2023             Passed - In person appointment or virtual visit in the past 12 mos or appointment in next 3 mos     Recent Outpatient Visits              3 weeks ago Rheumatoid arthritis with negative rheumatoid factor, involving unspecified site [facility])    [address], [address], [place]    Nurse Only    3 weeks ago Essential hypertension    Myrna Hendricks MD    Office Visit    1 month ago Rheumatoid arthritis with negative rheumatoid factor, involving unspecified site [facility])    [address], [address] [facility] [address], Salvador Slater MD    Office Visit    2 months ago Rheumatoid arthritis with negative rheumatoid factor, involving unspecified site [facility])    [address], [address], [place]    Nurse Only    2 months ago Ingrown toenail of right foot    [address] [facility] [place], [place]    Office Visit          Future Appointments         Provider Department Appt Notes    In 6 days [facility]  [facility], [place] cimzia injection    In 2 months Zulema May MD [address], [address] [facility] A1C    In 3 months Sakina Salazar MD [address], [address] [facility] [address] [facility] [address] [facility] [address]     In 5 months Zulema May MD [address], [address] [facility] [address] Annual checkup    In 5 months [address] [place] [address] Annual breast checkup

## 2023-03-10 ENCOUNTER — NURSE ONLY (OUTPATIENT)
Dept: RHEUMATOLOGY | Facility: CLINIC | Age: 78
End: 2023-03-10

## 2023-03-10 DIAGNOSIS — M06.00 RHEUMATOID ARTHRITIS WITH NEGATIVE RHEUMATOID FACTOR, INVOLVING UNSPECIFIED SITE (HCC): Primary | ICD-10-CM

## 2023-03-10 PROCEDURE — 96372 THER/PROPH/DIAG INJ SC/IM: CPT | Performed by: INTERNAL MEDICINE

## 2023-03-10 NOTE — PROGRESS NOTES
Patient presents to clinic for Cimzia injection. Injections given to right and left lower abdomen. Patient tolerated well and understands to return to clinic in 1 month for next injection.

## 2023-04-07 ENCOUNTER — NURSE ONLY (OUTPATIENT)
Dept: RHEUMATOLOGY | Facility: CLINIC | Age: 78
End: 2023-04-07

## 2023-04-07 DIAGNOSIS — M06.00 RHEUMATOID ARTHRITIS WITH NEGATIVE RHEUMATOID FACTOR, INVOLVING UNSPECIFIED SITE (HCC): Primary | ICD-10-CM

## 2023-04-07 PROCEDURE — 96372 THER/PROPH/DIAG INJ SC/IM: CPT | Performed by: INTERNAL MEDICINE

## 2023-04-07 NOTE — PROGRESS NOTES
Name and  verified. Patient aware she was to receive injection of Cimza. Injections given Lower B/L Adomen subcutaneous and pt tolerated well. Possible local side effects discussed with pt. Pt aware to follow up in 4 weeks for next injection and to follow up with provider in 2 month after labs completed.        Future Appointments   Date Time Provider William Vergara   2023  2:00 PM [facility] RN RHEUMATOLOGY  [facility]   2023  2:00 PM Aki Gupta MD [facility]   2023  1:00 PM Justin Dotson MD [address] [facility] SYSTEM OF THE [facility]   8/15/2023  2:00 PM Aki Gupta MD [facility]   2023 12:40 PM [facility] RM1 [facility] EM [facility] No

## 2023-05-05 ENCOUNTER — NURSE ONLY (OUTPATIENT)
Dept: RHEUMATOLOGY | Facility: CLINIC | Age: 78
End: 2023-05-05

## 2023-05-05 DIAGNOSIS — M06.9 RHEUMATOID ARTHRITIS, INVOLVING UNSPECIFIED SITE, UNSPECIFIED WHETHER RHEUMATOID FACTOR PRESENT (HCC): Primary | ICD-10-CM

## 2023-05-05 PROCEDURE — 96372 THER/PROPH/DIAG INJ SC/IM: CPT | Performed by: INTERNAL MEDICINE

## 2023-05-05 RX ORDER — LEFLUNOMIDE 20 MG/1
20 TABLET ORAL DAILY
Qty: 100 TABLET | Refills: 0 | Status: SHIPPED | OUTPATIENT
Start: 2023-05-05

## 2023-05-05 NOTE — TELEPHONE ENCOUNTER
Medication Quantity Refills Start End   leflunomide 20 MG Oral Tab 90 tablet 1 1/13/2023      LOV:   Future Appointments   Date Time Provider William Ursula   5/5/2023  2:00 PM [facility] RN RHEUMATOLOGY [facility]   5/12/2023  2:10 PM Kim Ferreira MD [address] [facility] OF THE [facility]   6/20/2023  2:40 PM Giselle Shepard MD [facility]   8/15/2023  2:00 PM Giselle Shepard MD [facility]   8/25/2023 12:40 PM [facility] RM1 [facility] EM [facility]     Labs:   Component      Latest Ref Rng 2/7/2023   Glucose      70 - 99 mg/dL 157 (H)    Sodium      136 - 145 mmol/L 141    Potassium      3.5 - 5.1 mmol/L 5.0    Chloride      98 - 112 mmol/L 108    Carbon Dioxide, Total      21.0 - 32.0 mmol/L 25.0    ANION GAP      0 - 18 mmol/L 8    BUN      7 - 18 mg/dL 33 (H)    CREATININE      0.55 - 1.02 mg/dL 1.09 (H)    BUN/CREATININE RATIO      10.0 - 20.0  30.3 (H)    CALCIUM      8.5 - 10.1 mg/dL 9.8    CALCULATED OSMOLALITY      275 - 295 mOsm/kg 303 (H)    eGFR-Cr      >=60 mL/min/1.73m2 53 (L)    ALT (SGPT)      13 - 56 U/L 18    AST (SGOT)      15 - 37 U/L 18    ALKALINE PHOSPHATASE      55 - 142 U/L 54 (L)    Total Bilirubin      0.1 - 2.0 mg/dL 0.3    PROTEIN, TOTAL      6.4 - 8.2 g/dL 7.6    Albumin      3.4 - 5.0 g/dL 3.7    Globulin      2.8 - 4.4 g/dL 3.9    A/G Ratio      1.0 - 2.0  0.9 (L)    Patient Fasting for CMP?  Yes    WBC      4.0 - 11.0 x10(3) uL 8.6    RBC      3.80 - 5.30 x10(6)uL 3.59 (L)    Hemoglobin      12.0 - 16.0 g/dL 11.2 (L)    Hematocrit      35.0 - 48.0 % 35.2    MCV      80.0 - 100.0 fL 98.1    MCH      26.0 - 34.0 pg 31.2    MCHC      31.0 - 37.0 g/dL 31.8    RDW      11.0 - 15.0 % 12.8    RDW-SD      35.1 - 46.3 fL 45.8    Platelet Count      577.8 - 450.0 10(3)uL 297.0    Cholesterol, Total      <200 mg/dL 177    HDL Cholesterol      40 - 59 mg/dL 63 (H)    Triglycerides      30 - 149 mg/dL 214 (H)    LDL Cholesterol Calc      <100 mg/dL 79    VLDL      0 - 30 mg/dL 34 (H)    NON-HDL CHOLESTEROL      <130 mg/dL 114    Patient Fasting for Lipid? Yes    HEMOGLOBIN A1c      <5.7 % 5.9 (H)    ESTIMATED AVERAGE GLUCOSE      68 - 126 mg/dL 123    C-REACTIVE PROTEIN      <0.30 mg/dL <0.29    SED RATE      0 - 30 mm/Hr 19       Legend:  (H) High  (L) Low    Summary:  1. Cont. Sulfasalazine 1000mg twice a day -     2. Cont.  lelfunomide  20mg a day -    3. Cont.  cimzi 400mg a month. -   4. Return to clinic in 6 months. 5. Labs today and then in 6 months.       Michael Erwin MD  1/13/2023   3:58 PM  - [place]ed [place]  through [place]

## 2023-05-05 NOTE — PROGRESS NOTES
Patient aware Cimzia injection. Injections given to right and left lower abdomen. Patient tolerated well and understands to return to clinic in 1 month for next injection.

## 2023-05-10 RX ORDER — METOPROLOL TARTRATE 50 MG/1
50 TABLET, FILM COATED ORAL 2 TIMES DAILY
Qty: 180 TABLET | Refills: 3 | Status: SHIPPED | OUTPATIENT
Start: 2023-05-10

## 2023-05-10 NOTE — TELEPHONE ENCOUNTER
Refill passed per [place] [employer] protocol    Requested Prescriptions   Pending Prescriptions Disp Refills    metoprolol tartrate 50 MG Oral Tab 180 tablet 3     Sig: Take 1 tablet (50 mg total) by mouth 2 (two) times daily.        Hypertensive Medications Protocol Passed - 5/10/2023 11:10 AM        Passed - In person appointment in the past 12 or next 3 months     Recent Outpatient Visits              5 days ago Rheumatoid arthritis, involving unspecified site, unspecified whether rheumatoid factor present ([facility] [address]    [address], [address], [place]    Nurse Only    1 month ago Rheumatoid arthritis with negative rheumatoid factor, involving unspecified site ([address]    [address], [address], [place]    Nurse Only    2 months ago Rheumatoid arthritis with negative rheumatoid factor, involving unspecified site ([facility] [address]    [address], [address], [place]    Nurse Only    2 months ago Bilateral impacted cerumen    [address], [address], Donald Valenzuela MD    Office Visit    2 months ago Rheumatoid arthritis with negative rheumatoid factor, involving unspecified site [facility])    [address], [address], [place]    Nurse Only          Future Appointments         Provider Department Appt Notes    In 2 days Teresa Knight MD [address]     In 1 month [facility] [facility] cimzia injection    In 1 month Shamir Duran MD [address], 148 [facility] [place] E7C Follow up  policy inf *ba    In 3 months Shamir Duran MD [address], [address] [facility] [place] Annual checkup    In 3 months John [address] [facility] Annual breast checkup               Passed - Last BP reading less than 140/90     BP Readings from Last 1 Encounters:  02/07/23 : 121/70                Passed - CMP or BMP in past 6 months     Recent Results (from the past 4392 hour(s))   COMP METABOLIC PANEL (14)    Collection Time: 02/07/23 10:21 AM   Result Value Ref Range    Glucose 157 (H) 70 - 99 mg/dL    Sodium 141 136 - 145 mmol/L    Potassium 5.0 3.5 - 5.1 mmol/L    Chloride 108 98 - 112 mmol/L    CO2 25.0 21.0 - 32.0 mmol/L    Anion Gap 8 0 - 18 mmol/L    BUN 33 (H) 7 - 18 mg/dL    Creatinine 1.09 (H) 0.55 - 1.02 mg/dL    BUN/CREA Ratio 30.3 (H) 10.0 - 20.0    Calcium, Total 9.8 8.5 - 10.1 mg/dL    Calculated Osmolality 303 (H) 275 - 295 mOsm/kg    eGFR-Cr 53 (L) >=60 mL/min/1.73m2    ALT 18 13 - 56 U/L    AST 18 15 - 37 U/L    Alkaline Phosphatase 54 (L) 55 - 142 U/L    Bilirubin, Total 0.3 0.1 - 2.0 mg/dL    Total Protein 7.6 6.4 - 8.2 g/dL    Albumin 3.7 3.4 - 5.0 g/dL    Globulin  3.9 2.8 - 4.4 g/dL    A/G Ratio 0.9 (L) 1.0 - 2.0    Patient Fasting for CMP? Yes      *Note: Due to a large number of results and/or encounters for the requested time period, some results have not been displayed. A complete set of results can be found in Results Review.                  Passed - In person appointment or virtual visit in the past 6 months     Recent Outpatient Visits              5 days ago Rheumatoid arthritis, involving unspecified site, unspecified whether rheumatoid factor present ([facility] [address]    [address], [address], [place]    Nurse Only    1 month ago Rheumatoid arthritis with negative rheumatoid factor, involving unspecified site ([facility] [address]    [address], [address], [place]    Nurse Only    2 months ago Rheumatoid arthritis with negative rheumatoid factor, involving unspecified site ([facility] [address]    [address], [address], [place]    Nurse Only    2 months ago Bilateral impacted cerumen    [address], [address], [place], Master Barraza MD    Office Visit    2 months ago Rheumatoid arthritis with negative rheumatoid factor, involving unspecified site [facility])    [address], [address], [place]    Nurse Only          Future Appointments         Provider Department Appt Notes    In 2 days Sakina Salazar MD [address]     In 1 month [facility]  [facility] [place] cimzia injection    In 1 month Zulema May MD [address], [address] [facility] Q8W Follow up  policy inf *ba    In 3 months Zulema May MD [address], [address] [facility] Annual checkup    In 3 months [facility] [address] [place] [address] Annual breast checkup               Passed [facility] or [facility] > 50     GFR Evaluation  EGFRCR: 53 , resulted on 2/7/2023

## 2023-05-12 ENCOUNTER — OFFICE VISIT (OUTPATIENT)
Dept: OTOLARYNGOLOGY | Facility: CLINIC | Age: 78
End: 2023-05-12

## 2023-05-12 VITALS — TEMPERATURE: 99 F

## 2023-05-12 DIAGNOSIS — H61.23 BILATERAL IMPACTED CERUMEN: Primary | ICD-10-CM

## 2023-05-12 PROCEDURE — 1159F MED LIST DOCD IN RCRD: CPT | Performed by: OTOLARYNGOLOGY

## 2023-05-12 PROCEDURE — 1160F RVW MEDS BY RX/DR IN RCRD: CPT | Performed by: OTOLARYNGOLOGY

## 2023-05-12 PROCEDURE — 99212 OFFICE O/P EST SF 10 MIN: CPT | Performed by: OTOLARYNGOLOGY

## 2023-05-12 PROCEDURE — 1126F AMNT PAIN NOTED NONE PRSNT: CPT | Performed by: OTOLARYNGOLOGY

## 2023-05-20 DIAGNOSIS — M06.00 RHEUMATOID ARTHRITIS WITH NEGATIVE RHEUMATOID FACTOR, INVOLVING UNSPECIFIED SITE (HCC): ICD-10-CM

## 2023-05-22 RX ORDER — SULFASALAZINE 500 MG/1
1000 TABLET ORAL 2 TIMES DAILY
Qty: 360 TABLET | Refills: 3 | Status: SHIPPED | OUTPATIENT
Start: 2023-05-22

## 2023-05-22 NOTE — TELEPHONE ENCOUNTER
Disp Refills Start End    sulfaSALAzine 500 MG Oral Tab 360 tablet 1 1/13/2023      LOV: 1/13/23  Future Appointments   Date Time Provider William Vergara   6/9/2023  2:00 PM [facility] RN RHEUMATOLOGY 2014 [facility] [address]   6/20/2023  2:40 PM Katherine Renteria MD [facility]   8/15/2023  2:00 PM Katherine Renteria MD [facility]   8/25/2023 12:40 PM [facility] [address] [place] [address]   11/17/2023  3:10 PM Domenic Douglas MD [address] [facility] [facility]     Labs:   Component      Latest Ref Rng 2/7/2023   Glucose      70 - 99 mg/dL 157 (H)    Sodium      136 - 145 mmol/L 141    Potassium      3.5 - 5.1 mmol/L 5.0    Chloride      98 - 112 mmol/L 108    Carbon Dioxide, Total      21.0 - 32.0 mmol/L 25.0    ANION GAP      0 - 18 mmol/L 8    BUN      7 - 18 mg/dL 33 (H)    CREATININE      0.55 - 1.02 mg/dL 1.09 (H)    BUN/CREATININE RATIO      10.0 - 20.0  30.3 (H)    CALCIUM      8.5 - 10.1 mg/dL 9.8    CALCULATED OSMOLALITY      275 - 295 mOsm/kg 303 (H)    eGFR-Cr      >=60 mL/min/1.73m2 53 (L)    ALT (SGPT)      13 - 56 U/L 18    AST (SGOT)      15 - 37 U/L 18    ALKALINE PHOSPHATASE      55 - 142 U/L 54 (L)    Total Bilirubin      0.1 - 2.0 mg/dL 0.3    PROTEIN, TOTAL      6.4 - 8.2 g/dL 7.6    Albumin      3.4 - 5.0 g/dL 3.7    Globulin      2.8 - 4.4 g/dL 3.9    A/G Ratio      1.0 - 2.0  0.9 (L)    Patient Fasting for CMP? Yes    WBC      4.0 - 11.0 x10(3) uL 8.6    RBC      3.80 - 5.30 x10(6)uL 3.59 (L)    Hemoglobin      12.0 - 16.0 g/dL 11.2 (L)    Hematocrit      35.0 - 48.0 % 35.2    MCV      80.0 - 100.0 fL 98.1    MCH      26.0 - 34.0 pg 31.2    MCHC      31.0 - 37.0 g/dL 31.8    RDW      11.0 - 15.0 % 12.8    RDW-SD      35.1 - 46.3 fL 45.8    Platelet Count      205.1 - 450.0 10(3)uL 297.0    C-REACTIVE PROTEIN      <0.30 mg/dL <0.29    SED RATE      0 - 30 mm/Hr 19       Legend:  (H) High  (L) Low    Summary:  1. Cont. Sulfasalazine 1000mg twice a day -     2. Cont.  lelfunomide  20mg a day -    3.  Cont.  cimzi 400mg a month. -   4. Return to clinic in 6 months. 5. Labs today and then in 6 months.       Erickson Marie MD  1/13/2023   3:58 PM  - [place]ed [place]

## 2023-06-09 ENCOUNTER — NURSE ONLY (OUTPATIENT)
Dept: RHEUMATOLOGY | Facility: CLINIC | Age: 78
End: 2023-06-09

## 2023-06-09 DIAGNOSIS — M06.00 RHEUMATOID ARTHRITIS WITH NEGATIVE RHEUMATOID FACTOR, INVOLVING UNSPECIFIED SITE (HCC): Primary | ICD-10-CM

## 2023-06-09 PROCEDURE — 96372 THER/PROPH/DIAG INJ SC/IM: CPT | Performed by: INTERNAL MEDICINE

## 2023-06-09 NOTE — PROGRESS NOTES
Name and  verified. Patient aware she was to receive injection of Cimza. Injections given Lower B/L Adomen subcutaneous and pt tolerated well. Possible local side effects discussed with pt. Pt aware to follow up in 4 weeks for next injection and to follow up with provider in 2 month after labs completed.        Future Appointments   Date Time Provider William Ursula   2023  2:40 PM Berto Garrett MD [facility]   2023  2:00 PM [facility] RN RHEUMATOLOGY  [facility]   2023  1:30 PM Lor Chase DPM E[facility]HPOD [facility]   2023  2:50 PM Josefina Lyle MD  [facility]   8/15/2023  2:00 PM Berto Garrett MD [facility]   2023 12:40 PM [address]   2023  3:10 PM Sandra Ramesh MD [address] [facility]

## 2023-06-20 ENCOUNTER — OFFICE VISIT (OUTPATIENT)
Dept: INTERNAL MEDICINE CLINIC | Facility: CLINIC | Age: 78
End: 2023-06-20

## 2023-06-20 VITALS
WEIGHT: 111 LBS | DIASTOLIC BLOOD PRESSURE: 80 MMHG | BODY MASS INDEX: 23.3 KG/M2 | HEART RATE: 86 BPM | SYSTOLIC BLOOD PRESSURE: 159 MMHG | HEIGHT: 58 IN

## 2023-06-20 DIAGNOSIS — G57.02 COMMON PERONEAL NEUROPATHY OF LEFT LOWER EXTREMITY: ICD-10-CM

## 2023-06-20 DIAGNOSIS — I10 PRIMARY HYPERTENSION: Primary | ICD-10-CM

## 2023-06-20 DIAGNOSIS — E53.8 VITAMIN B12 DEFICIENCY: ICD-10-CM

## 2023-06-20 DIAGNOSIS — M06.00 RHEUMATOID ARTHRITIS WITH NEGATIVE RHEUMATOID FACTOR, INVOLVING UNSPECIFIED SITE (HCC): ICD-10-CM

## 2023-06-20 DIAGNOSIS — K80.20 CALCULUS OF GALLBLADDER WITHOUT CHOLECYSTITIS WITHOUT OBSTRUCTION: ICD-10-CM

## 2023-06-20 DIAGNOSIS — E55.9 VITAMIN D DEFICIENCY: ICD-10-CM

## 2023-06-20 DIAGNOSIS — E11.9 DIABETES MELLITUS TYPE 2 WITHOUT RETINOPATHY (HCC): ICD-10-CM

## 2023-06-20 DIAGNOSIS — R19.8 CHANGE IN BOWEL FUNCTION: ICD-10-CM

## 2023-06-20 DIAGNOSIS — Z95.1 S/P CABG X 3: ICD-10-CM

## 2023-06-20 DIAGNOSIS — D64.9 ANEMIA, UNSPECIFIED TYPE: ICD-10-CM

## 2023-06-20 DIAGNOSIS — E78.2 HYPERLIPIDEMIA, MIXED: ICD-10-CM

## 2023-06-20 PROCEDURE — 1159F MED LIST DOCD IN RCRD: CPT | Performed by: INTERNAL MEDICINE

## 2023-06-20 PROCEDURE — 3008F BODY MASS INDEX DOCD: CPT | Performed by: INTERNAL MEDICINE

## 2023-06-20 PROCEDURE — 1126F AMNT PAIN NOTED NONE PRSNT: CPT | Performed by: INTERNAL MEDICINE

## 2023-06-20 PROCEDURE — 3077F SYST BP >= 140 MM HG: CPT | Performed by: INTERNAL MEDICINE

## 2023-06-20 PROCEDURE — 3079F DIAST BP 80-89 MM HG: CPT | Performed by: INTERNAL MEDICINE

## 2023-06-20 PROCEDURE — 99214 OFFICE O/P EST MOD 30 MIN: CPT | Performed by: INTERNAL MEDICINE

## 2023-06-21 ENCOUNTER — LAB ENCOUNTER (OUTPATIENT)
Dept: LAB | Age: 78
End: 2023-06-21
Attending: INTERNAL MEDICINE
Payer: MEDICARE

## 2023-06-21 DIAGNOSIS — Z51.81 THERAPEUTIC DRUG MONITORING: ICD-10-CM

## 2023-06-21 DIAGNOSIS — D64.9 ANEMIA, UNSPECIFIED TYPE: ICD-10-CM

## 2023-06-21 DIAGNOSIS — E78.2 HYPERLIPIDEMIA, MIXED: ICD-10-CM

## 2023-06-21 DIAGNOSIS — E53.8 VITAMIN B12 DEFICIENCY: ICD-10-CM

## 2023-06-21 DIAGNOSIS — E55.9 VITAMIN D DEFICIENCY: ICD-10-CM

## 2023-06-21 DIAGNOSIS — M06.00 RHEUMATOID ARTHRITIS WITH NEGATIVE RHEUMATOID FACTOR, INVOLVING UNSPECIFIED SITE (HCC): ICD-10-CM

## 2023-06-21 DIAGNOSIS — E11.9 DIABETES MELLITUS TYPE 2 WITHOUT RETINOPATHY (HCC): ICD-10-CM

## 2023-06-21 LAB
ALBUMIN SERPL-MCNC: 3.4 G/DL (ref 3.4–5)
ALBUMIN/GLOB SERPL: 0.8 {RATIO} (ref 1–2)
ALP LIVER SERPL-CCNC: 56 U/L
ALT SERPL-CCNC: 22 U/L
ANION GAP SERPL CALC-SCNC: 7 MMOL/L (ref 0–18)
AST SERPL-CCNC: 25 U/L (ref 15–37)
BASOPHILS # BLD AUTO: 0.08 X10(3) UL (ref 0–0.2)
BASOPHILS NFR BLD AUTO: 1.2 %
BILIRUB SERPL-MCNC: 0.3 MG/DL (ref 0.1–2)
BUN BLD-MCNC: 25 MG/DL (ref 7–18)
BUN/CREAT SERPL: 24.3 (ref 10–20)
CALCIUM BLD-MCNC: 10 MG/DL (ref 8.5–10.1)
CHLORIDE SERPL-SCNC: 106 MMOL/L (ref 98–112)
CHOLEST SERPL-MCNC: 175 MG/DL (ref ?–200)
CO2 SERPL-SCNC: 29 MMOL/L (ref 21–32)
CREAT BLD-MCNC: 1.03 MG/DL
CRP SERPL-MCNC: <0.29 MG/DL (ref ?–0.3)
DEPRECATED RDW RBC AUTO: 45.9 FL (ref 35.1–46.3)
EOSINOPHIL # BLD AUTO: 0.45 X10(3) UL (ref 0–0.7)
EOSINOPHIL NFR BLD AUTO: 6.9 %
ERYTHROCYTE [DISTWIDTH] IN BLOOD BY AUTOMATED COUNT: 13 % (ref 11–15)
ERYTHROCYTE [SEDIMENTATION RATE] IN BLOOD: 30 MM/HR
EST. AVERAGE GLUCOSE BLD GHB EST-MCNC: 108 MG/DL (ref 68–126)
FASTING PATIENT LIPID ANSWER: YES
FASTING STATUS PATIENT QL REPORTED: YES
FOLATE SERPL-MCNC: >20 NG/ML (ref 8.7–?)
GFR SERPLBLD BASED ON 1.73 SQ M-ARVRAT: 56 ML/MIN/1.73M2 (ref 60–?)
GLOBULIN PLAS-MCNC: 4.2 G/DL (ref 2.8–4.4)
GLUCOSE BLD-MCNC: 116 MG/DL (ref 70–99)
HBA1C MFR BLD: 5.4 % (ref ?–5.7)
HCT VFR BLD AUTO: 36.4 %
HDLC SERPL-MCNC: 72 MG/DL (ref 40–59)
HGB BLD-MCNC: 11.4 G/DL
IMM GRANULOCYTES # BLD AUTO: 0.01 X10(3) UL (ref 0–1)
IMM GRANULOCYTES NFR BLD: 0.2 %
IRON SATN MFR SERPL: 14 %
IRON SERPL-MCNC: 57 UG/DL
LDLC SERPL CALC-MCNC: 79 MG/DL (ref ?–100)
LYMPHOCYTES # BLD AUTO: 2.83 X10(3) UL (ref 1–4)
LYMPHOCYTES NFR BLD AUTO: 43.1 %
MCH RBC QN AUTO: 30.4 PG (ref 26–34)
MCHC RBC AUTO-ENTMCNC: 31.3 G/DL (ref 31–37)
MCV RBC AUTO: 97.1 FL
MONOCYTES # BLD AUTO: 0.82 X10(3) UL (ref 0.1–1)
MONOCYTES NFR BLD AUTO: 12.5 %
NEUTROPHILS # BLD AUTO: 2.37 X10 (3) UL (ref 1.5–7.7)
NEUTROPHILS # BLD AUTO: 2.37 X10(3) UL (ref 1.5–7.7)
NEUTROPHILS NFR BLD AUTO: 36.1 %
NONHDLC SERPL-MCNC: 103 MG/DL (ref ?–130)
OSMOLALITY SERPL CALC.SUM OF ELEC: 299 MOSM/KG (ref 275–295)
PLATELET # BLD AUTO: 316 10(3)UL (ref 150–450)
POTASSIUM SERPL-SCNC: 4.3 MMOL/L (ref 3.5–5.1)
PROT SERPL-MCNC: 7.6 G/DL (ref 6.4–8.2)
RBC # BLD AUTO: 3.75 X10(6)UL
SODIUM SERPL-SCNC: 142 MMOL/L (ref 136–145)
TIBC SERPL-MCNC: 404 UG/DL (ref 240–450)
TRANSFERRIN SERPL-MCNC: 271 MG/DL (ref 200–360)
TRIGL SERPL-MCNC: 143 MG/DL (ref 30–149)
VIT B12 SERPL-MCNC: 479 PG/ML (ref 193–986)
VIT D+METAB SERPL-MCNC: 41.8 NG/ML (ref 30–100)
VLDLC SERPL CALC-MCNC: 22 MG/DL (ref 0–30)
WBC # BLD AUTO: 6.6 X10(3) UL (ref 4–11)

## 2023-06-21 PROCEDURE — 84466 ASSAY OF TRANSFERRIN: CPT

## 2023-06-21 PROCEDURE — 83036 HEMOGLOBIN GLYCOSYLATED A1C: CPT

## 2023-06-21 PROCEDURE — 82746 ASSAY OF FOLIC ACID SERUM: CPT

## 2023-06-21 PROCEDURE — 82607 VITAMIN B-12: CPT

## 2023-06-21 PROCEDURE — 85025 COMPLETE CBC W/AUTO DIFF WBC: CPT

## 2023-06-21 PROCEDURE — 82306 VITAMIN D 25 HYDROXY: CPT

## 2023-06-21 PROCEDURE — 86140 C-REACTIVE PROTEIN: CPT

## 2023-06-21 PROCEDURE — 85652 RBC SED RATE AUTOMATED: CPT

## 2023-06-21 PROCEDURE — 36415 COLL VENOUS BLD VENIPUNCTURE: CPT

## 2023-06-21 PROCEDURE — 83540 ASSAY OF IRON: CPT

## 2023-06-21 PROCEDURE — 80061 LIPID PANEL: CPT

## 2023-06-21 PROCEDURE — 80053 COMPREHEN METABOLIC PANEL: CPT

## 2023-07-07 ENCOUNTER — NURSE ONLY (OUTPATIENT)
Dept: RHEUMATOLOGY | Facility: CLINIC | Age: 78
End: 2023-07-07

## 2023-07-07 DIAGNOSIS — M06.00 RHEUMATOID ARTHRITIS WITH NEGATIVE RHEUMATOID FACTOR, INVOLVING UNSPECIFIED SITE (HCC): Primary | ICD-10-CM

## 2023-07-07 PROCEDURE — 96372 THER/PROPH/DIAG INJ SC/IM: CPT | Performed by: INTERNAL MEDICINE

## 2023-07-12 ENCOUNTER — HOSPITAL ENCOUNTER (OUTPATIENT)
Dept: ULTRASOUND IMAGING | Facility: HOSPITAL | Age: 78
Discharge: HOME | End: 2023-07-12
Attending: INTERNAL MEDICINE
Payer: MEDICARE

## 2023-07-12 DIAGNOSIS — K80.20 CALCULUS OF GALLBLADDER WITHOUT CHOLECYSTITIS WITHOUT OBSTRUCTION: ICD-10-CM

## 2023-07-12 PROCEDURE — 76705 ECHO EXAM OF ABDOMEN: CPT | Performed by: INTERNAL MEDICINE

## 2023-07-19 RX ORDER — SIMVASTATIN 40 MG
40 TABLET ORAL NIGHTLY
Qty: 100 TABLET | Refills: 2 | OUTPATIENT
Start: 2023-07-19

## 2023-07-20 NOTE — TELEPHONE ENCOUNTER
Pharmacy    [facility] ([address]) - [place], [place] - [address] [place] [address] [phone], [phone]        Disp Refills Start End    simvastatin 40 MG Oral Tab 90 tablet 2 3/4/2023     Sig - Route: Take 1 tablet (40 mg total) by mouth nightly.  - Oral    Sent to pharmacy as: Simvastatin 40 MG Oral Tablet (Zocor)    E-Prescribing Status: Receipt confirmed by pharmacy (3/4/2023  7:36 PM CST)

## 2023-07-26 ENCOUNTER — TELEPHONE (OUTPATIENT)
Dept: INTERNAL MEDICINE CLINIC | Facility: CLINIC | Age: 78
End: 2023-07-26

## 2023-07-26 NOTE — TELEPHONE ENCOUNTER
Current Outpatient Medications:       lisinopril 2.5 MG Oral Tab, Take 1 tablet (2.5 mg total) by mouth daily. , Disp: 90 tablet, Rfl: 3

## 2023-07-26 NOTE — TELEPHONE ENCOUNTER
Duplicate request refill too [place] @ [facility]  Last filled 7/26/23  100 day supply, still has refill on file

## 2023-08-05 NOTE — TELEPHONE ENCOUNTER
Medication Quantity Refills Start End   leflunomide 20 MG Oral Tab 100 tablet 0 5/5/2023    LOV: 1/13/23  Future Appointments   Date Time Provider William Vergara   8/7/2023  1:30 PM Zachary Mills DPM E[facility]FHPOD [facility] OF THE [facility]   8/8/2023  4:30 PM [facility] OF THE [facility]   8/9/2023  2:50 PM Ernie Suh MD [facility] OF [facility]   8/15/2023  2:00 PM Leesa Santo MD [facility]   8/25/2023 12:40 PM [address]   11/17/2023  3:10 PM Stefan Mcgarry MD [address] [facility]     Labs:  Component      Latest Ref Rng 6/21/2023   Glucose      70 - 99 mg/dL 116 (H)    Sodium      136 - 145 mmol/L 142    Potassium      3.5 - 5.1 mmol/L 4.3    Chloride      98 - 112 mmol/L 106    Carbon Dioxide, Total      21.0 - 32.0 mmol/L 29.0    ANION GAP      0 - 18 mmol/L 7    BUN      7 - 18 mg/dL 25 (H)    CREATININE      0.55 - 1.02 mg/dL 1.03 (H)    BUN/CREATININE RATIO      10.0 - 20.0  24.3 (H)    CALCIUM      8.5 - 10.1 mg/dL 10.0    CALCULATED OSMOLALITY      275 - 295 mOsm/kg 299 (H)    eGFR-Cr      >=60 mL/min/1.73m2 56 (L)    ALT (SGPT)      13 - 56 U/L 22    AST (SGOT)      15 - 37 U/L 25    ALKALINE PHOSPHATASE      55 - 142 U/L 56    Total Bilirubin      0.1 - 2.0 mg/dL 0.3    PROTEIN, TOTAL      6.4 - 8.2 g/dL 7.6    Albumin      3.4 - 5.0 g/dL 3.4    Globulin      2.8 - 4.4 g/dL 4.2    A/G Ratio      1.0 - 2.0  0.8 (L)    Patient Fasting for CMP? Yes    C-REACTIVE PROTEIN      <0.30 mg/dL <0.29    SED RATE      0 - 30 mm/Hr 30       Legend:  (H) High  (L) Low  Summary:  1. Cont. Sulfasalazine 1000mg twice a day -     2. Cont.  lelfunomide  20mg a day -    3. Cont.  cimzi 400mg a month. -   4. Return to clinic in 6 months. 5. Labs today and then in 6 months.       Michael Erwin MD  1/13/2023   3:58 PM  - [place]ed [place]  through [place]

## 2023-08-07 ENCOUNTER — OFFICE VISIT (OUTPATIENT)
Dept: PODIATRY CLINIC | Facility: CLINIC | Age: 78
End: 2023-08-07

## 2023-08-07 DIAGNOSIS — M21.372 LEFT FOOT DROP: ICD-10-CM

## 2023-08-07 DIAGNOSIS — S90.821A HEEL BLISTER, RIGHT, INITIAL ENCOUNTER: ICD-10-CM

## 2023-08-07 DIAGNOSIS — E11.42 TYPE 2 DIABETES MELLITUS WITH POLYNEUROPATHY (HCC): Primary | ICD-10-CM

## 2023-08-07 RX ORDER — LEFLUNOMIDE 20 MG/1
20 TABLET ORAL DAILY
Qty: 100 TABLET | Refills: 2 | Status: SHIPPED | OUTPATIENT
Start: 2023-08-07

## 2023-08-07 NOTE — PROGRESS NOTES
[facility], [facility]  Progress Note    Ashli Gomez is a 69year old female. Patient presents with:  Diabetic Foot Care: Consult- nail trim and foot check- FBS this AX=786- last AIC =5.4 on 6/21/2023- and also was told by a surgeon she have a L foot drop- rates pain 2/10 on and off        HPI:     This is a pleasant well controlled diabetic female with CAD S/P CABG x3. She has PMH of lumbar spine arthritis, left foot drop and RA. She does have PMH of left common peroneal nerve decompression. She is on leflunomide, gabapentin, cimzia. She does have some numbness to her left foot at times. She does wear a left AFO brace when working out. She presents to clinic today for diabetic foot care. Allergies: Patient has no known allergies. Current Outpatient Medications   Medication Sig Dispense Refill    leflunomide 20 MG Oral Tab Take 1 tablet (20 mg total) by mouth daily. 100 tablet 2    metFORMIN 500 MG Oral Tab Take 1 tablet (500 mg total) by mouth daily with breakfast. 90 tablet 3    sulfaSALAzine 500 MG Oral Tab Take 2 tablets (1,000 mg total) by mouth 2 (two) times daily. 360 tablet 3    simvastatin 40 MG Oral Tab Take 1 tablet (40 mg total) by mouth nightly. 90 tablet 2    lisinopril 2.5 MG Oral Tab Take 1 tablet (2.5 mg total) by mouth daily. 90 tablet 3    GABAPENTIN 600 MG Oral Tab TAKE 1 TABLET BY MOUTH  TWICE DAILY 180 tablet 3    HYDROCHLOROTHIAZIDE 12.5 MG Oral Tab TAKE 1 TABLET BY MOUTH ONCE DAILY 90 tablet 3    aspirin 81 MG Oral Tab EC Take 1 tablet (81 mg total) by mouth once. Magnesium 400 MG Oral Tab Take 1 tablet by mouth daily. 1 tablet 0    LYSINE OR Take 1 tablet by mouth daily. Certolizumab Pegol (CIMZIA PREFILLED) 2 X 200 MG/ML Subcutaneous Kit Inject 400 mg into the skin every 28 days. 1 kit 3    Acetaminophen  MG Oral Tab CR Take 2 tablets (1,300 mg total) by mouth in the morning and 2 tablets (1,300 mg total) before bedtime.   1    Calcium Carb-Cholecalciferol (CALCIUM 500 +D OR) Take 1,200 mg by mouth. Take 1 tab. Every day        Past Medical History:   Diagnosis Date    Amblyopia 1952    R eye vision impaired since childhood    Anemia 07/2017    mixed iron and B12 deficiency    CAD (coronary artery disease) 2007    acute MI and had CABG in 3/2007     Cataracts, bilateral 2004    [place]; sees Dr. Isabel Gar    Coronary atherosclerosis of autologous vein bypass graft     Diabetes ([facility] [address]     Elevated serum globulin level 07/2017    HECTOR-7/28/17-no monoclonal proteins. Gallstone 02/18/2020    Incidental finding 2.6 cm gallstone on x-ray lumbar spine 2/18/20. Gallstones 03/2022    incidental on MRI lumbar    Humerus fracture 04/2019    Fx L humerus--sling per Dr. Lamar Mcclain    Hyperlipidemia, mixed     Hypertension, essential     Lumbar disc disease 2019    Osteoarthritis     Osteopenia     dexa 2008-osteopenia. 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 Recurrent cold sores     takes acyclovir prn (Dr Ruther Schilder rx in about 2014)    Rheumatoid arthritis [facility]) 2010    sees Dr Tania Chavez    Type 2 diabetes mellitus ([facility] [address] 2005    oral medication. Vitamin B12 deficiency 07/2017    Vitamin B12 level low at 126 on 7/28/17. Past Surgical History:   Procedure Laterality Date    ANGIOPLASTY (CORONARY)  2007    APPENDECTOMY  1974    BACK SURGERY  04/09/2019     Re-exploration and resection herniated intervertebral disc, L4-L5 for recurrence. Emilio Hsieh 62    x3    CABG  2007    Dr Adela Stubbs Left 01/19/2021    COLONOSCOPY  08/29/2017    hemorrhoids otherwise normal    COLONOSCOPY N/A 8/29/2017    Procedure: COLONOSCOPY;  Surgeon: Rafi Stubbs MD;  Location: [address] [place] [address] ENDOSCOPY    DECOMPRESSION; UNSPECIFIED  06/30/2022    L peroneal nerve decompression for foot drop.  Dr Echeverria Members at [facility] Left 02/26/2019    Left L4-5 lumbar laminectomy Dr. Verónica Ritchie       Family History   Problem Relation Age of Onset    Heart Disease Father 67        CAD; Cause of death    Cancer Maternal Aunt [de-identified]        pancreatic    Macular degeneration Mother     Other ( age 80 old age) Mother     Stroke Sister 46        CVA    Other (1 brother, 2 sisters.) Other     Other (sciatica) Brother     Other (2 sons, 1 daughter) Other     Asthma Son     Bleeding Disorders Neg     Clotting Disorder Neg     Breast Cancer Neg     Ovarian Cancer Neg       Social History    Socioeconomic History      Marital status:     Occupational History      Occupation: previously worked in [place] at [employer] for [address] [place] [address]. Tobacco Use      Smoking status: Former        Packs/day: 1.00        Years: 16.00        Pack years: 16        Types: Cigarettes        Quit date: 1975        Years since quittin.6        Passive exposure: Past      Smokeless tobacco: Never    Vaping Use      Vaping Use: Never used    Substance and Sexual Activity      Alcohol use: Yes        Alcohol/week: 0.0 standard drinks of alcohol        Comment: rarely      Drug use: No    Other Topics      Concerns:        Caffeine Concern: Yes          Occ        Reaction to local anesthetic: No          REVIEW OF SYSTEMS:   Denies nausea, fever, chills  No calf pain  No other muscle or joint aches  Denies chest pain or shortness of breath. EXAM:   There were no vitals taken for this visit. Constitutional:   Patient in no apparent distress. Well kept. Of normal body habitus. Alert and oriented to person, place, and time. Vascular Examination:  DP pulse is 2/4  PT pulse is NP  Capillary refill is adequate  Integumentary Examination:   Digital hair growth is absent  Skin is of diminished texture and decreased turgor. Right plantar lateral heel bulla measuring appox [id number] with no SOI    Neurological Examination:  Monofilament (10-g) sensation is 5/5 to right and 3/5 to left.   Sharp/dull is present to right and is present to left. Parasthesias present left foot  Musculoskeletal Examination:  Muscle Strength is 5/5 Right foot   Left partial foot drop      LABS & IMAGING:     Lab Results   Component Value Date     (H) 06/21/2023    BUN 25 (H) 06/21/2023    CREATSERUM 1.03 (H) 06/21/2023    BUNCREA 24.3 (H) 06/21/2023    ANIONGAP 7 06/21/2023    GFRAA 86 06/27/2022    GFRNAA 75 06/27/2022    CA 10.0 06/21/2023     06/21/2023    K 4.3 06/21/2023     06/21/2023    CO2 29.0 06/21/2023    OSMOCALC 299 (H) 06/21/2023        Lab Results   Component Value Date     06/21/2023    A1C 5.4 06/21/2023        No results found. ASSESSMENT AND PLAN:   Diagnoses and all orders for this visit:    Type 2 diabetes mellitus with polyneuropathy (HCC)    Left foot drop    Heel blister, right, initial encounter        Plan:     Diabetic education and instructions have been provided. We reviewed and discussed the following:    -risk categories related to pts with diabetes and foot or lower extremity complications per ADA. -adherence to medication regimen and close monitoring or blood sugar control.   -daily monitoring/inspection of feet and shoes.   -proper management of diet and weight   -regular follow up with PCP and specialty providers as recommended   -Lower extremity complications related to DM were reviewed and stressed prevention. Discussed treatment options for the right heel blister. Pt elects for I and D of the blister and verbal consent obtained. Procedure: Incision and drainage of bulla [address] Right heel  Discussed blister/bulla etiology. The area was prepped with betadine. The overlying epithelial dome was incised using a #15 blade and betadine solution was applied with bandaid. Approximately 1 cc of serous fluid was evacuated. Discussed proper shoe fit, and changing of socks if wet. Discussed synthetic moisture-wicking sock material preference.   Discussed the importance of good skin hygiene. Recommend patient monitor skin for adverse changes. Applied betadine ointment and bandaid over the I and D site of the right heel blister, pt to change daily as above until dry and healed. If any SOI or delayed healing she is to call the clinic immediately. Pt to cont wearing the Left AFO brace which she already has at home. Pt is not interested in diabetic shoes/inserts at this time. RTC 1 year for CDFE    No follow-ups on file.     Johnie Bush DPM  8/7/2023

## 2023-08-09 ENCOUNTER — OFFICE VISIT (OUTPATIENT)
Dept: RHEUMATOLOGY | Facility: CLINIC | Age: 78
End: 2023-08-09

## 2023-08-09 VITALS
WEIGHT: 113.19 LBS | BODY MASS INDEX: 23.76 KG/M2 | RESPIRATION RATE: 16 BRPM | SYSTOLIC BLOOD PRESSURE: 164 MMHG | HEART RATE: 73 BPM | DIASTOLIC BLOOD PRESSURE: 61 MMHG | HEIGHT: 58 IN

## 2023-08-09 DIAGNOSIS — Z51.81 THERAPEUTIC DRUG MONITORING: ICD-10-CM

## 2023-08-09 DIAGNOSIS — M06.00 RHEUMATOID ARTHRITIS WITH NEGATIVE RHEUMATOID FACTOR, INVOLVING UNSPECIFIED SITE (HCC): Primary | ICD-10-CM

## 2023-08-09 PROCEDURE — 3077F SYST BP >= 140 MM HG: CPT | Performed by: INTERNAL MEDICINE

## 2023-08-09 PROCEDURE — 3008F BODY MASS INDEX DOCD: CPT | Performed by: INTERNAL MEDICINE

## 2023-08-09 PROCEDURE — 3078F DIAST BP <80 MM HG: CPT | Performed by: INTERNAL MEDICINE

## 2023-08-09 PROCEDURE — 1125F AMNT PAIN NOTED PAIN PRSNT: CPT | Performed by: INTERNAL MEDICINE

## 2023-08-09 PROCEDURE — 99214 OFFICE O/P EST MOD 30 MIN: CPT | Performed by: INTERNAL MEDICINE

## 2023-08-09 PROCEDURE — 96372 THER/PROPH/DIAG INJ SC/IM: CPT | Performed by: INTERNAL MEDICINE

## 2023-08-09 RX ORDER — SULFASALAZINE 500 MG/1
1500 TABLET ORAL 2 TIMES DAILY
Qty: 540 TABLET | Refills: 3 | COMMUNITY
Start: 2023-08-09

## 2023-08-09 NOTE — PATIENT INSTRUCTIONS
Summary:  1. Cont. Sulfasalazine 1000mg twice a day -     2. Cont.  lelfunomide  20mg a day -    3. Cont.  cimzi 400mg a month. -   4. Return to clinic in 6 months. 5. Labs today and then in 6 months.

## 2023-08-09 NOTE — PROGRESS NOTES
Edith Cosby is a 69year old female. HPI:   Patient presents with:  Rheumatoid Arthritis  Medication Follow-Up: Cimzia  Lab Results  Hand Pain: Right      I had the pleasure of seeing Ayaka Perkins . As you recall she is extremely pleasant 76-year-old who's a history of seronegative rheumatoid arthritis. Since then, she's been doing very well on  methotrexate 17.5 mg a week and sulfasalazine 500 mg once a day. She is doing well on lower the dose of her meds. She occl has wrist pain. She's not having a pain. She hasn't been walking. She's at the [facility] in the am.   She's going to a wedding in [place] and a cruise in [place]. Overall no interval illnesses. 7/23/2018  She's doing well. She's tolerating cimzia 400mg a months -   She's also on lelfunomide 20mg every day and ssz 1000mg tid. She is not able to close her left hand completley - but not changed over the last 1 year. se feels her pain is so much nile.r   Her right elbow also hurts. 10/29/2018  She is not having a flare. She does get 5/10 pain in her hands. She doubled her leflunomide for 1 week while waiting for ssz - . D/w her not to do this in the future. Her hands have the most issues. She had her flu shot  No illnesses. She doesn't feel like she can taper the ssz or lelfunomide yet. She still gets pains in her hands. 1/16/2019  She is doing well on cimiza 400mg a month, . She can't make a toatal fist with the left hand but it's getting better. She has 4/10 pain. She still has right wrist pain that's mild. She feels better. No coughs, no fevers. She does pool work every day. 5/20/2019  She tripped 1 month ago - fractured her left2 humerus. She's having left 1st toe. She had sciatic in 2/2019 and 3/2019  - had two sx -    She ha sresidual  Left 1st toe numbness   She has lost stregnth in her left foot - she has a left foot drop   She has physical therapy for left leg.    Dr. Laurie Mendes , neurosx - - did her back sx - before the sx - she did have some  problesm with the the left foot and toe - had a left foot drop  Sciatica pain is better  The RA is not bad. She feels it's mostly her arm being in a sling that she's not being able to do things. 8/26/2019  She had sciatica pian in 1/2019 0 and has had left foot drop since then. She is going to see if it gets better in a few years. Her RA is under control. Her left arm is healed now. She is watching her walking and making sure she doesn't trip b/c of her left foot drop. She finished PT for 4 months on left arm and foot. She has 1/10 pain. She is getting DEXA today. 1/22/2020   She is still trying to make a fist with her left hand- but almost but not quite. She doesn't have much pain. She has 3/10 in her left hand. She still has difficulty walkign b/c of her brace. She had a lto of left nerve damage. She does 2 miles a day. She still dose water aerobics every morning.     6/22/2020  She is oding well. The arthriits was getting bad for a while b/c she skipped her march and April injeciton of cimzia. She restarted in may 2020. She feels it the most in her right shoulder and right hand. Her pain is 6-7/10. She feels moving her hand is really painful. She doesn't noticed swelling. She's had right shoulder pain since May or April. The [facility] is opening but water aerobics - opening in 10/2020    1/25/2021  Her arthritis is not bad today. She hasn't gone back yet. She's taking tyelnol 600mg for arthritis. Her right shoulder is doing fine. Her right hand is doing real well. She can't make a fist with her right ahnd but not with her left hand. She has about 3/10 pain. She thinks her right shoulder pain was b/c she missed 3 months of cimzia shots. 7/21/2021  The summer is going quickly. She feels pretty good. The left hand is always bothering her more than her right hand. Her left wrist is tender. She has 1/10 pain.    She is getting her cimizia today. Her right shoulder is ok now. Her family is ok. No side effects with covid vaccine. She is forgetting  To take afternoon ssz - and she is takign 1000mg bid really. 12/28/2021   She is doing well, she has pain I her left 1-5th knuckles it's just started 1 week. She has a little tenderness. She has 3/10 pain. She has been on the medicare advantage plan since 1/2021 - so now it's $650 -     6/27/2022  She has a left drop foot and getting peroneal nerve sx at [facility] on 6/30. She stopped taking some of her meds to prepare for the surgery. She stopped metoprolol so her bp is very high right now. She called surgeons office about cimzia - told to hold 1 week before and 2 weeks after - so she is taking if on 7/14. She's taking leflunomide 20mg a day and ssz 1000mg bid - this is ok. Her pain is ok - occl left hand pain. Overall doing well. 1/13/2023  She has left common peroneal nerve decompiression and neuroplasty on 6/30/2022 - seh ahs hs xon chronic left foot drop that worsene. She has more right hand pain - in he raplm and her right 4th finger. She gets a right 4th trigger ringer. It's not bad today. She has ano pain today. She is mild soresns in her right 4th finger. She overall is ok and stable on the cimzia , leflunomide and ssz .     8/9/2023   She still does her treatdmill daily   She gets a local nerve   She still does her water aerobics   She can move her left foot up more than last time - partialy better. Her fingers doenst' bothe rher . She can still makea  fist but her left one is better. Her right 4th finger is better.      HISTORY:  Past Medical History:   Diagnosis Date    Amblyopia 1952    R eye vision impaired since childhood    Anemia 07/2017    mixed iron and B12 deficiency    CAD (coronary artery disease) 2007    acute MI and had CABG in 3/2007     Cataracts, bilateral 2004    [place]; sees Dr. Ann Marie Ramirez    Coronary atherosclerosis of autologous vein bypass graft     Diabetes (HCC)     Elevated serum globulin level 07/2017    HECTOR-7/28/17-no monoclonal proteins. Gallstone 02/18/2020    Incidental finding 2.6 cm gallstone on x-ray lumbar spine 2/18/20. Gallstones 03/2022    incidental on MRI lumbar    Humerus fracture 04/2019    Fx L humerus--sling per Dr. Joanie Cruz    Hyperlipidemia, mixed     Hypertension, essential     Lumbar disc disease 2019    Osteoarthritis     Osteopenia     dexa 2008-osteopenia. 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 Recurrent cold sores     takes acyclovir prn (Dr Christi Frey rx in about 2014)    Rheumatoid arthritis [facility]) 2010    sees Dr Tal Geiger    Type 2 diabetes mellitus ([facility] 2005    oral medication. Vitamin B12 deficiency 07/2017    Vitamin B12 level low at 126 on 7/28/17. Social Hx Reviewed   Family Hx Reviewed     Medications (Active prior to today's visit):  Current Outpatient Medications   Medication Sig Dispense Refill    leflunomide 20 MG Oral Tab Take 1 tablet (20 mg total) by mouth daily. 100 tablet 2    metFORMIN 500 MG Oral Tab Take 1 tablet (500 mg total) by mouth daily with breakfast. 90 tablet 3    sulfaSALAzine 500 MG Oral Tab Take 2 tablets (1,000 mg total) by mouth 2 (two) times daily. 360 tablet 3    simvastatin 40 MG Oral Tab Take 1 tablet (40 mg total) by mouth nightly. 90 tablet 2    lisinopril 2.5 MG Oral Tab Take 1 tablet (2.5 mg total) by mouth daily. 90 tablet 3    GABAPENTIN 600 MG Oral Tab TAKE 1 TABLET BY MOUTH  TWICE DAILY 180 tablet 3    HYDROCHLOROTHIAZIDE 12.5 MG Oral Tab TAKE 1 TABLET BY MOUTH ONCE DAILY 90 tablet 3    aspirin 81 MG Oral Tab EC Take 1 tablet (81 mg total) by mouth once. LYSINE OR Take 1 tablet by mouth daily. Certolizumab Pegol (CIMZIA PREFILLED) 2 X 200 MG/ML Subcutaneous Kit Inject 400 mg into the skin every 28 days.  1 kit 3    Acetaminophen  MG Oral Tab CR Take 2 tablets (1,300 mg total) by mouth in the morning and 2 tablets (1,300 mg total) before bedtime. 1    Calcium Carb-Cholecalciferol (CALCIUM 500 +D OR) Take 1,200 mg by mouth. Take 1 tab. Every day      Magnesium 400 MG Oral Tab Take 1 tablet by mouth daily. (Patient not taking: Reported on 8/9/2023) 1 tablet 0       Allergies:  No Known Allergies      ROS:   All other ROS are negative. PHYSICAL EXAM:   HEENT: Clear oropharynx, no oral ulcers, EOM intact, clear sclear, PERRLA, pleasant, no acute distress, no CAD, no neck tendnerness, good ROM,   No rashes  CVS: RRR, no murmurs  RS: CTAB, no crackles, no rhonchi  ABD: Soft No  Non jotnt pian   nontender, no HSM felt, BS positive  Joint exam:   right wrist and right shoudler not tender , decreased felxion   Left wrist not tende rwith flexion. B/l thumbs not tender -   Mild tender in shoulders  But rom inatct   Left 3rd finger mild swelling  can't close left  hand completely, still hard fro her.but better  Can close right   Right elbow  contracture deformity - better  -   Left foot drop -  No heel pain   No right calf tendnerss. No tendneress in mtps at this time/   No left hip tendneress.    Squeeze test negative -   No edema  Component      Latest Ref Rng 6/21/2023   WBC      4.0 - 11.0 x10(3) uL 6.6    RBC      3.80 - 5.30 x10(6)uL 3.75 (L)    Hemoglobin      12.0 - 16.0 g/dL 11.4 (L)    Hematocrit      35.0 - 48.0 % 36.4    MCV      80.0 - 100.0 fL 97.1    MCH      26.0 - 34.0 pg 30.4    MCHC      31.0 - 37.0 g/dL 31.3    RDW-SD      35.1 - 46.3 fL 45.9    RDW      11.0 - 15.0 % 13.0    Platelet Count      655.2 - 450.0 10(3)uL 316.0    Prelim Neutrophil Abs      1.50 - 7.70 x10 (3) uL 2.37    Neutrophils Absolute      1.50 - 7.70 x10(3) uL 2.37    Lymphocytes Absolute      1.00 - 4.00 x10(3) uL 2.83    Monocytes Absolute      0.10 - 1.00 x10(3) uL 0.82    Eosinophils Absolute      0.00 - 0.70 x10(3) uL 0.45    Basophils Absolute      0.00 - 0.20 x10(3) uL 0.08    Immature Granulocyte Absolute      0.00 - 1.00 x10(3) uL 0.01    Neutrophils %      % 36.1    Lymphocytes %      % 43.1    Monocytes %      % 12.5    Eosinophils %      % 6.9    Basophils %      % 1.2    Immature Granulocyte %      % 0.2    Glucose      70 - 99 mg/dL 116 (H)    Sodium      136 - 145 mmol/L 142    Potassium      3.5 - 5.1 mmol/L 4.3    Chloride      98 - 112 mmol/L 106    Carbon Dioxide, Total      21.0 - 32.0 mmol/L 29.0    ANION GAP      0 - 18 mmol/L 7    BUN      7 - 18 mg/dL 25 (H)    CREATININE      0.55 - 1.02 mg/dL 1.03 (H)    BUN/CREATININE RATIO      10.0 - 20.0  24.3 (H)    CALCIUM      8.5 - 10.1 mg/dL 10.0    CALCULATED OSMOLALITY      275 - 295 mOsm/kg 299 (H)    eGFR-Cr      >=60 mL/min/1.73m2 56 (L)    ALT (SGPT)      13 - 56 U/L 22    AST (SGOT)      15 - 37 U/L 25    ALKALINE PHOSPHATASE      55 - 142 U/L 56    Total Bilirubin      0.1 - 2.0 mg/dL 0.3    PROTEIN, TOTAL      6.4 - 8.2 g/dL 7.6    Albumin      3.4 - 5.0 g/dL 3.4    Globulin      2.8 - 4.4 g/dL 4.2    A/G Ratio      1.0 - 2.0  0.8 (L)    Patient Fasting for CMP? Yes    Cholesterol, Total      <200 mg/dL 175    HDL Cholesterol      40 - 59 mg/dL 72 (H)    Triglycerides      30 - 149 mg/dL 143    LDL Cholesterol Calc      <100 mg/dL 79    VLDL      0 - 30 mg/dL 22    NON-HDL CHOLESTEROL      <130 mg/dL 103    Patient Fasting for Lipid? Yes    Iron, Serum      50 - 170 ug/dL 57    Transferrin      200 - 360 mg/dL 271    Iron Bind. Cap.(TIBC)      240 - 450 ug/dL 404    Iron Saturation      15 - 50 % 14 (L)    HEMOGLOBIN A1c      <5.7 % 5.4    ESTIMATED AVERAGE GLUCOSE      68 - 126 mg/dL 108    C-REACTIVE PROTEIN      <0.30 mg/dL <0.29    SED RATE      0 - 30 mm/Hr 30    FOLATE (FOLIC ACID), SERUM      >=8.7 ng/mL >20.0    Vitamin B12      193 - 986 pg/mL 479    VITAMIN D, 25-OH, TOTAL      30.0 - 100.0 ng/mL 41.8       Legend:  (L) Low  (H) High    3/23/2019 mri lumbar spine     Large left paracentral disc protrusion at L4-L5 with effacement of the left lateral and subarticular recesses and mass effect upon the left L4 nerve root is new since 2/4/2019. Extension of the disc protrusion into the left L4-L5 neural foramen with severe narrowing of the left neural foramen. Postoperative changes of a left L4-L5 laminotomy. Enhancement of the left L4 and L5 nerve roots may be secondary to neuritis given recent intervention. 1/16/2019 - dexa  LEFT FEMORAL NECK               BMD:    0.719 gm/sq. cm.            T SCORE:         -1.2               PA LUMBAR SPINE (L1 - L4)               BMD:    0.872 gm/sq. cm.            T SCORE:         -1.6            2/14/2022 - DEXA    LEFT FEMORAL NECK   BMD: 0.727 gm/sq. cm. T SCORE: -1.1 Z SCORE: 1.0       LEFT TOTAL HIP   BMD: 0.931 gm/sq. cm. T SCORE: -0.1 Z SCORE: 1.7       PA LUMBAR SPINE (L1 - L4)   BMD: 0.981 gm/sq. cm. T SCORE: -0.6 Z SCORE: 1.8   ASSESSMENT/PLAN:     1. RA (rheumatoid arthritis) ([facility] [address]  . Seronegative rheumatoid arthritis -   - stable , in remission - gradual changes have happenin her hands    cont. on lelfunomide 200mg a day and ssz 1000mg bid  Cont. cimzia 400mg - started  June 2018 -   Check labs in 6months   - return to clinic in 6 months. Mostly her function issues is her left foot driop from back sx -    -switched to leflunomide 10mg a day on 3/2016 - from methotrexate 15mg a week   - stopped leflunomide in beginning of 3/2017 - b/c of expense but went back on b/c of her pain being bad   3/2018 - quantiferon gold and hep b and c studies. ,    - cont. h2o aerobics when it reopens   She wants to switch to  Sulfasalazine b/c of insurance coverage - she will switch in march 2017    2. History of right eye visual loss - does not want to be on Plaquenil, f/u with optho, gets eye exam end of the year - but also gets it for her diabetes   4. CAD status post CABG a left leg venous grafting-stable   5.   Diabetes type 2-controlled, continue meds  6. Anemia - EDWIN - egd/cscope ok - f/u hematology - but could also be from chronic disease -   7. Osteopenia - 2008 - with hx of RA - makes her high risk for osteoporosiss - given RA activity -  1/2019 -   FRAX  Score   The ten year probability of fracture (%)without BM  Major osteoporosis  19%  Hip fracture 7.7  Check DEXA  - due for dexa in 2/2024 -     8. Left leg sciatica s/p 2 back sx with persistent left foot drop - done by dr. Joey Gil   - pain is better   Causing her to trip   - gababapnetin  9. 6/29/2021- cataract in right eye removed, 1/2021 - had left cataract removed. 10.s/p left common peroneal nerve decompression and neuroplasty on 6/30/2022- she will repeat emg in 7/2023 - and wait a litlte longer -not bettery yet. No left leg pain       Summary:  1. Cont. Sulfasalazine 1000mg twice a day -     2. Cont.  lelfunomide  20mg a day -    3. Cont.  cimzi 400mg a month. -   4. Return to clinic in 6 months. 5. Labs today and then in 6 months.       Rei Horn MD  8/9/2023   3:08 PM  - Reviewed [place]- [place]  through [employer]

## 2023-08-09 NOTE — PROGRESS NOTES
Name and  verified. Pt aware she was to receive injection of Cimza. Injections given Bilateral Lower Abdomen subcutaneous and pt tolerated well. Possible local side effects discussed with pt. Pt aware to follow up in 4 weeks for next injection and to follow up with provider in 6 month after labs completed.        Future Appointments   Date Time Provider William Vergara   8/15/2023  2:00 PM Maria De Jesus Frederick MD [facility]   2023 12:40 PM [address]   2023  2:00 PM [facility] RN RHEUMATOLOGY  [facility]   2023  3:10 PM Travis Modi MD [address] [facility]

## 2023-08-15 ENCOUNTER — OFFICE VISIT (OUTPATIENT)
Dept: INTERNAL MEDICINE CLINIC | Facility: CLINIC | Age: 78
End: 2023-08-15

## 2023-08-15 VITALS
DIASTOLIC BLOOD PRESSURE: 73 MMHG | HEART RATE: 66 BPM | WEIGHT: 111 LBS | SYSTOLIC BLOOD PRESSURE: 189 MMHG | HEIGHT: 58 IN | BODY MASS INDEX: 23.3 KG/M2

## 2023-08-15 DIAGNOSIS — M48.061 NEURAL FORAMINAL STENOSIS OF LUMBAR SPINE: ICD-10-CM

## 2023-08-15 DIAGNOSIS — I25.10 CAD IN NATIVE ARTERY: ICD-10-CM

## 2023-08-15 DIAGNOSIS — M21.372 LEFT FOOT DROP: ICD-10-CM

## 2023-08-15 DIAGNOSIS — E78.2 HYPERLIPIDEMIA, MIXED: ICD-10-CM

## 2023-08-15 DIAGNOSIS — D64.9 CHRONIC ANEMIA: ICD-10-CM

## 2023-08-15 DIAGNOSIS — E53.8 VITAMIN B12 DEFICIENCY: ICD-10-CM

## 2023-08-15 DIAGNOSIS — M06.00 RHEUMATOID ARTHRITIS WITH NEGATIVE RHEUMATOID FACTOR, INVOLVING UNSPECIFIED SITE (HCC): ICD-10-CM

## 2023-08-15 DIAGNOSIS — G57.02 COMMON PERONEAL NEUROPATHY OF LEFT LOWER EXTREMITY: ICD-10-CM

## 2023-08-15 DIAGNOSIS — Z00.00 MEDICARE ANNUAL WELLNESS VISIT, SUBSEQUENT: Primary | ICD-10-CM

## 2023-08-15 DIAGNOSIS — I10 PRIMARY HYPERTENSION: ICD-10-CM

## 2023-08-15 DIAGNOSIS — E55.9 VITAMIN D DEFICIENCY: ICD-10-CM

## 2023-08-15 DIAGNOSIS — M47.816 ARTHRITIS, LUMBAR SPINE: ICD-10-CM

## 2023-08-15 DIAGNOSIS — E11.9 DIABETES MELLITUS TYPE 2 WITHOUT RETINOPATHY (HCC): ICD-10-CM

## 2023-08-15 DIAGNOSIS — Z95.1 S/P CABG X 3: ICD-10-CM

## 2023-08-15 RX ORDER — LISINOPRIL 10 MG/1
10 TABLET ORAL DAILY
Qty: 90 TABLET | Refills: 1 | Status: SHIPPED | OUTPATIENT
Start: 2023-08-15

## 2023-08-15 RX ORDER — LISINOPRIL 10 MG/1
10 TABLET ORAL DAILY
Qty: 90 TABLET | Refills: 0 | Status: SHIPPED | OUTPATIENT
Start: 2023-08-15 | End: 2023-08-15

## 2023-08-25 ENCOUNTER — HOSPITAL ENCOUNTER (OUTPATIENT)
Dept: MAMMOGRAPHY | Facility: HOSPITAL | Age: 78
Discharge: HOME | End: 2023-08-25
Attending: INTERNAL MEDICINE
Payer: MEDICARE

## 2023-08-25 DIAGNOSIS — Z12.31 VISIT FOR SCREENING MAMMOGRAM: ICD-10-CM

## 2023-08-25 PROCEDURE — 77067 SCR MAMMO BI INCL CAD: CPT | Performed by: INTERNAL MEDICINE

## 2023-08-25 PROCEDURE — 77063 BREAST TOMOSYNTHESIS BI: CPT | Performed by: INTERNAL MEDICINE

## 2023-08-31 NOTE — ADDENDUM NOTE
Addended by: Demarco Simmons on: 9/18/2018 11:59 AM     Modules accepted: Brendan Statement Selected

## 2023-09-06 ENCOUNTER — NURSE ONLY (OUTPATIENT)
Dept: RHEUMATOLOGY | Facility: CLINIC | Age: 78
End: 2023-09-06

## 2023-09-06 PROCEDURE — 96372 THER/PROPH/DIAG INJ SC/IM: CPT | Performed by: INTERNAL MEDICINE

## 2023-09-06 NOTE — PROGRESS NOTES
Name and  verified. Pt aware she was to receive injection of Cimza. Pt denies new illness and not on antibiotic at this time. Injections given in bilateral lower abdomen and pt tolerated well. Possible local side effects discussed with pt. Pt aware to follow up in 4 weeks for next injection; appt already scheduled for 10/4/23.

## 2023-09-22 RX ORDER — SIMVASTATIN 40 MG
40 TABLET ORAL NIGHTLY
Qty: 90 TABLET | Refills: 3 | Status: SHIPPED | OUTPATIENT
Start: 2023-09-22

## 2023-09-22 NOTE — TELEPHONE ENCOUNTER
Current Outpatient Medications:       GABAPENTIN 600 MG Oral Tab, TAKE 1 TABLET BY MOUTH  TWICE DAILY, Disp: 180 tablet, Rfl: 3      HYDROCHLOROTHIAZIDE 12.5 MG Oral Tab, TAKE 1 TABLET BY MOUTH ONCE DAILY, Disp: 90 tablet, Rfl: 3

## 2023-09-22 NOTE — TELEPHONE ENCOUNTER
Refill passed per [facility] protocol.      Requested Prescriptions   Pending Prescriptions Disp Refills    SIMVASTATIN 40 MG Oral Tab [Pharmacy Med Name: Simvastatin 40 MG Oral Tablet] 70 tablet 4     Sig: TAKE 1 TABLET BY MOUTH AT NIGHT       Cholesterol Medication Protocol Passed - 9/21/2023 10:22 PM        Passed - ALT in past 12 months        Passed - LDL in past 12 months        Passed - Last ALT < 80     Lab Results   Component Value Date    ALT 22 06/21/2023             Passed - Last LDL < 130     Lab Results   Component Value Date    LDL 79 06/21/2023             Passed - In person appointment or virtual visit in the past 12 mos or appointment in next 3 mos     Recent Outpatient Visits              2 weeks ago     Cheikh Payan, [address], [place]    Nurse Only    1 month ago Niraj Celaya annual wellness visit, subsequent    Isis Patino MD    Office Visit    1 month ago Rheumatoid arthritis with negative rheumatoid factor, involving unspecified site [facility])    Cheikh Payan, [address], Leydi Astorga MD    Office Visit    1 month ago Type 2 diabetes mellitus with polyneuropathy [facility])    Cheikh Payan, [address], [place]    Office Visit    2 months ago Rheumatoid arthritis with negative rheumatoid factor, involving unspecified site [facility])    Cheikh Payan, [address], [place]    Nurse Only          Future Appointments         Provider Department Appt Notes    In 1 week EC [address], [place] cimzia injection    In 1 month MD Cheikh Bruce, [address]                          [email or web address]      [email or web address]

## 2023-09-24 NOTE — TELEPHONE ENCOUNTER
Please review. Protocol failed / No Protocol. Medication never prescribed by clinic  Requested Prescriptions   Pending Prescriptions Disp Refills    gabapentin 600 MG Oral Tab 180 tablet 3     Sig: Take 1 tablet (600 mg total) by mouth 2 (two) times daily. Neurology Medications Passed - 9/22/2023 12:50 PM        Passed - In person appointment or virtual visit in the past 6 mos or appointment in next 3 mos     Recent Outpatient Visits              2 weeks ago     [address], [address], [address] Only    1 month ago Niraj Celaya annual wellness visit, subsequent    Josselyn Meade MD    Office Visit    1 month ago Rheumatoid arthritis with negative rheumatoid factor, involving unspecified site [facility])    [address], [address], Divya Fowler MD    Office Visit    1 month ago Type 2 diabetes mellitus with polyneuropathy [facility])    [address], [address], Casimiro [place]    Office Visit    2 months ago Rheumatoid arthritis with negative rheumatoid factor, involving unspecified site [facility])    [address], [address], [place]    Nurse Only          Future Appointments         Provider Department Appt Notes    In 1 week  [facility], [address], [place] cimzia injection    In 1 month Pily Hubbard MD [facility], [address], [place]                       hydroCHLOROthiazide 12.5 MG Oral Tab 90 tablet 3     Sig: Take 1 tablet (12.5 mg total) by mouth daily.        Hypertensive Medications Protocol Failed - 9/22/2023 12:50 PM        Failed - Last BP reading less than 140/90     BP Readings from Last 1 Encounters:  08/15/23 : (!) 189/73              Passed - In person appointment in the past 12 or next 3 months     Recent Outpatient Visits              2 weeks ago     [facility], [address], [place]    Nurse Only    1 month ago Niraj Celaya annual wellness visit, subsequent    Karley Carter MD    Office Visit    1 month ago Rheumatoid arthritis with negative rheumatoid factor, involving unspecified site [facility])    [address], [address], [place]    Office Visit    1 month ago Type 2 diabetes mellitus with polyneuropathy ([facility] [address]    [address], [address], [place]    Office Visit    2 months ago Rheumatoid arthritis with negative rheumatoid factor, involving unspecified site ([facility] [address]    [address], [address], [place]    Nurse Only          Future Appointments         Provider Department Appt Notes    In 1 week [facility] RN RHEUMATOLOGY [employer], [address], [place] cimzia injection    In 1 month MD Gonzales [facility], [address], [place]                     Passed - CMP or BMP in past 6 months     Recent Results (from the past 4392 hour(s))   Comp Metabolic Panel    Collection Time: 06/21/23  7:32 AM   Result Value Ref Range    Glucose 116 (H) 70 - 99 mg/dL    Sodium 142 136 - 145 mmol/L    Potassium 4.3 3.5 - 5.1 mmol/L    Chloride 106 98 - 112 mmol/L    CO2 29.0 21.0 - 32.0 mmol/L    Anion Gap 7 0 - 18 mmol/L    BUN 25 (H) 7 - 18 mg/dL    Creatinine 1.03 (H) 0.55 - 1.02 mg/dL    BUN/CREA Ratio 24.3 (H) 10.0 - 20.0    Calcium, Total 10.0 8.5 - 10.1 mg/dL    Calculated Osmolality 299 (H) 275 - 295 mOsm/kg    eGFR-Cr 56 (L) >=60 mL/min/1.73m2    ALT 22 13 - 56 U/L    AST 25 15 - 37 U/L    Alkaline Phosphatase 56 55 - 142 U/L    Bilirubin, Total 0.3 0.1 - 2.0 mg/dL    Total Protein 7.6 6.4 - 8.2 g/dL    Albumin 3.4 3.4 - 5.0 g/dL    Globulin  4.2 2.8 - 4.4 g/dL    A/G Ratio 0.8 (L) 1.0 - 2.0    Patient Fasting for CMP?  Yes      *Note: Due to a large number of results and/or encounters for the requested time period, some results have not been displayed. A complete set of results can be found in Results Review.                Passed - In person appointment or virtual visit in the past 6 months     Recent Outpatient Visits              2 weeks ago     [address], [address], [employer]    Nurse Only    1 month ago [facility] annual wellness visit, subsequent    Geraldine Beard MD    Office Visit    1 month ago Rheumatoid arthritis with negative rheumatoid factor, involving unspecified site [facility])    [address], [address], Daisy Morales MD    Office Visit    1 month ago Type 2 diabetes mellitus with polyneuropathy [facility])    [address], [address], [place]    Office Visit    2 months ago Rheumatoid arthritis with negative rheumatoid factor, involving unspecified site [facility])    [address], [address], [place]    Nurse Only          Future Appointments         Provider Department Appt Notes    In 1 week  CF  [facility], [place] cimzia injection    In 1 month Domenic Douglas MD [address] [facility], [place]                     Passed - [place] or [facility] > 50     GFR Evaluation  EGFRCR: 56 , resulted on 6/21/2023

## 2023-09-26 RX ORDER — GABAPENTIN 600 MG/1
600 TABLET ORAL 2 TIMES DAILY
Qty: 180 TABLET | Refills: 3 | Status: SHIPPED | OUTPATIENT
Start: 2023-09-26

## 2023-09-26 RX ORDER — HYDROCHLOROTHIAZIDE 12.5 MG/1
12.5 TABLET ORAL DAILY
Qty: 90 TABLET | Refills: 3 | Status: SHIPPED | OUTPATIENT
Start: 2023-09-26

## 2023-10-04 ENCOUNTER — NURSE ONLY (OUTPATIENT)
Dept: RHEUMATOLOGY | Facility: CLINIC | Age: 78
End: 2023-10-04

## 2023-10-04 DIAGNOSIS — M06.00 RHEUMATOID ARTHRITIS WITH NEGATIVE RHEUMATOID FACTOR, INVOLVING UNSPECIFIED SITE (HCC): Primary | ICD-10-CM

## 2023-10-04 PROCEDURE — 96372 THER/PROPH/DIAG INJ SC/IM: CPT | Performed by: INTERNAL MEDICINE

## 2023-10-04 NOTE — PROGRESS NOTES
Name and  verified. Pt aware she was to receive injection of Cimza. Injections given and pt tolerated well. Pt aware to follow up in 4 weeks for next injection. She has a follow up appointment with provider in 2 months. Summary:  1. Cont. Sulfasalazine 1000mg twice a day -     2. Cont.  lelfunomide  20mg a day -    3. Cont.  cimzi 400mg a month. -   4. Return to clinic in 6 months. 5. Labs today and then in 6 months.       Jia Soto MD  2023   3:08 PM  - [place]ed [place]- [place]  through [employer]

## 2023-10-05 ENCOUNTER — PATIENT MESSAGE (OUTPATIENT)
Dept: INTERNAL MEDICINE CLINIC | Facility: CLINIC | Age: 78
End: 2023-10-05

## 2023-10-06 RX ORDER — LISINOPRIL 10 MG/1
10 TABLET ORAL DAILY
Qty: 90 TABLET | Refills: 1 | OUTPATIENT
Start: 2023-10-06

## 2023-10-09 ENCOUNTER — PATIENT MESSAGE (OUTPATIENT)
Dept: INTERNAL MEDICINE CLINIC | Facility: CLINIC | Age: 78
End: 2023-10-09

## 2023-10-09 DIAGNOSIS — E11.9 DIABETES MELLITUS TYPE 2 WITHOUT RETINOPATHY (HCC): Primary | ICD-10-CM

## 2023-10-10 ENCOUNTER — LAB ENCOUNTER (OUTPATIENT)
Dept: LAB | Facility: HOSPITAL | Age: 78
End: 2023-10-10
Attending: INTERNAL MEDICINE
Payer: MEDICARE

## 2023-10-10 DIAGNOSIS — I10 PRIMARY HYPERTENSION: ICD-10-CM

## 2023-10-10 DIAGNOSIS — M06.00 RHEUMATOID ARTHRITIS WITH NEGATIVE RHEUMATOID FACTOR, INVOLVING UNSPECIFIED SITE (HCC): ICD-10-CM

## 2023-10-10 DIAGNOSIS — Z51.81 THERAPEUTIC DRUG MONITORING: ICD-10-CM

## 2023-10-10 LAB
ALBUMIN SERPL-MCNC: 3.7 G/DL (ref 3.4–5)
ALBUMIN/GLOB SERPL: 0.9 {RATIO} (ref 1–2)
ALP LIVER SERPL-CCNC: 55 U/L
ALT SERPL-CCNC: 23 U/L
ANION GAP SERPL CALC-SCNC: 6 MMOL/L (ref 0–18)
AST SERPL-CCNC: 18 U/L (ref 15–37)
BILIRUB SERPL-MCNC: 0.3 MG/DL (ref 0.1–2)
BUN BLD-MCNC: 36 MG/DL (ref 7–18)
BUN/CREAT SERPL: 28.8 (ref 10–20)
CALCIUM BLD-MCNC: 9.3 MG/DL (ref 8.5–10.1)
CHLORIDE SERPL-SCNC: 112 MMOL/L (ref 98–112)
CO2 SERPL-SCNC: 24 MMOL/L (ref 21–32)
CREAT BLD-MCNC: 1.25 MG/DL
EGFRCR SERPLBLD CKD-EPI 2021: 44 ML/MIN/1.73M2 (ref 60–?)
FASTING STATUS PATIENT QL REPORTED: NO
GLOBULIN PLAS-MCNC: 3.9 G/DL (ref 2.8–4.4)
GLUCOSE BLD-MCNC: 84 MG/DL (ref 70–99)
OSMOLALITY SERPL CALC.SUM OF ELEC: 302 MOSM/KG (ref 275–295)
POTASSIUM SERPL-SCNC: 4.8 MMOL/L (ref 3.5–5.1)
PROT SERPL-MCNC: 7.6 G/DL (ref 6.4–8.2)
SODIUM SERPL-SCNC: 142 MMOL/L (ref 136–145)

## 2023-10-10 PROCEDURE — 36415 COLL VENOUS BLD VENIPUNCTURE: CPT

## 2023-10-10 PROCEDURE — 80053 COMPREHEN METABOLIC PANEL: CPT

## 2023-10-10 NOTE — TELEPHONE ENCOUNTER
From: Lou Jones  To: Jaredkarley Norbertojunior  Sent: 10/9/2023 9:58 PM CDT  Subject: Lisinopril    I just called [facility] and the young lady I talked with agreed with me that I last received Lisinopril the end of July. Apparently, I canceled the prescription they were going to send me in mid-August because that was too soon. The prescription received the end of July was for 2.5mg. Dr Thao Sung increased it to 10mg which is why I am now out of it. I had been taking 4 pills a day.

## 2023-10-11 RX ORDER — LISINOPRIL 10 MG/1
10 TABLET ORAL DAILY
Qty: 90 TABLET | Refills: 3
Start: 2023-10-11

## 2023-11-02 ENCOUNTER — TELEPHONE (OUTPATIENT)
Dept: RHEUMATOLOGY | Facility: CLINIC | Age: 78
End: 2023-11-02

## 2023-11-02 NOTE — TELEPHONE ENCOUNTER
Pt has appt for Cimzia injection and said she was not able to make it. Pt calling to make another appt.  Please advise

## 2023-11-03 ENCOUNTER — NURSE ONLY (OUTPATIENT)
Dept: RHEUMATOLOGY | Facility: CLINIC | Age: 78
End: 2023-11-03
Payer: COMMERCIAL

## 2023-11-03 DIAGNOSIS — M06.00 RHEUMATOID ARTHRITIS WITH NEGATIVE RHEUMATOID FACTOR, INVOLVING UNSPECIFIED SITE (HCC): Primary | ICD-10-CM

## 2023-11-03 PROCEDURE — 96372 THER/PROPH/DIAG INJ SC/IM: CPT | Performed by: INTERNAL MEDICINE

## 2023-11-03 NOTE — PROGRESS NOTES
Name and  verified. Pt aware she was to receive injection of Cimza. Pt denies current illness or antibiotic use. Injections given in bilateral lower abdomen and pt tolerated well. Possible local side effects discussed with pt. Pt aware to follow up in 4 weeks for next injections.

## 2023-11-17 ENCOUNTER — OFFICE VISIT (OUTPATIENT)
Dept: OTOLARYNGOLOGY | Facility: CLINIC | Age: 78
End: 2023-11-17
Payer: COMMERCIAL

## 2023-11-17 VITALS — HEIGHT: 58 IN | WEIGHT: 111 LBS | BODY MASS INDEX: 23.3 KG/M2

## 2023-11-17 DIAGNOSIS — H61.23 BILATERAL IMPACTED CERUMEN: Primary | ICD-10-CM

## 2023-12-01 ENCOUNTER — NURSE ONLY (OUTPATIENT)
Dept: RHEUMATOLOGY | Facility: CLINIC | Age: 78
End: 2023-12-01
Payer: COMMERCIAL

## 2023-12-01 DIAGNOSIS — M06.00 RHEUMATOID ARTHRITIS WITH NEGATIVE RHEUMATOID FACTOR, INVOLVING UNSPECIFIED SITE (HCC): Primary | ICD-10-CM

## 2023-12-01 PROCEDURE — 96372 THER/PROPH/DIAG INJ SC/IM: CPT | Performed by: INTERNAL MEDICINE

## 2023-12-01 NOTE — PROGRESS NOTES
Name and  verified. Pt aware she was to receive injection of Cimza. Patient denies current illness or antibiotic use. Injections given in bilateral lower abdomen and pt tolerated well. Possible local side effects discussed with pt. Pt aware to follow up in 4 weeks for next injection and to follow up with provider in 2 month after labs completed.

## 2023-12-06 NOTE — TELEPHONE ENCOUNTER
Bladder cancer To nursing, please tell patient hemoglobin A1c result from 3/19/18 is 5.7. This indicates good overall sugar control. Her last A1c was 6.6 on 7/28/17. Please continue metformin 1000 mg twice daily.   I do not feel she needs to add another diabetes medi Bladder cancer

## 2023-12-08 ENCOUNTER — OFFICE VISIT (OUTPATIENT)
Dept: OTOLARYNGOLOGY | Facility: CLINIC | Age: 78
End: 2023-12-08

## 2023-12-08 VITALS — WEIGHT: 111 LBS | BODY MASS INDEX: 23.3 KG/M2 | HEIGHT: 58 IN

## 2023-12-08 DIAGNOSIS — H93.90 EAR LESION: Primary | ICD-10-CM

## 2023-12-08 PROCEDURE — 1160F RVW MEDS BY RX/DR IN RCRD: CPT | Performed by: OTOLARYNGOLOGY

## 2023-12-08 PROCEDURE — 1159F MED LIST DOCD IN RCRD: CPT | Performed by: OTOLARYNGOLOGY

## 2023-12-08 PROCEDURE — 3008F BODY MASS INDEX DOCD: CPT | Performed by: OTOLARYNGOLOGY

## 2023-12-08 PROCEDURE — 99212 OFFICE O/P EST SF 10 MIN: CPT | Performed by: OTOLARYNGOLOGY

## 2023-12-08 RX ORDER — METOPROLOL TARTRATE 50 MG/1
TABLET, FILM COATED ORAL
COMMUNITY
Start: 2023-11-19

## 2023-12-15 RX ORDER — LISINOPRIL 10 MG/1
10 TABLET ORAL DAILY
Qty: 100 TABLET | Refills: 2 | OUTPATIENT
Start: 2023-12-15

## 2023-12-15 NOTE — TELEPHONE ENCOUNTER
Please review. Protocol failed / No Protocol.    Requested Prescriptions   Pending Prescriptions Disp Refills    LISINOPRIL 10 MG Oral Tab [Pharmacy Med Name: Lisinopril 10 MG Oral Tablet] 100 tablet 2     Sig: TAKE 1 TABLET BY MOUTH DAILY       Hypertensive Medications Protocol Failed - 12/14/2023  9:52 PM        Failed - Last BP reading less than 140/90     BP Readings from Last 1 Encounters:   08/15/23 (!) 189/73               Failed - EGFRCR or GFRNAA > 50     GFR Evaluation  EGFRCR: 44 , resulted on 10/10/2023          Passed - In person appointment in the past 12 or next 3 months     Recent Outpatient Visits              1 week ago Ear lesion    [facility] Carlito Garcia MD    Office Visit    2 weeks ago Rheumatoid arthritis with negative rheumatoid factor, involving unspecified site ([facility])    [facility]    Nurse Only    4 weeks ago Bilateral impacted cerumen    [facility] Carlito Garcia MD    Office Visit    1 month ago Rheumatoid arthritis with negative rheumatoid factor, involving unspecified site ([facility])    [facility]    Nurse Only    2 months ago Rheumatoid arthritis with negative rheumatoid factor, involving unspecified site ([facility])    [facility]    Nurse Only          Future Appointments         Provider Department Appt Notes    In 2 weeks [facility] RN RHEUMATOLOGY [facility] Cimzia injection    In 1 month Mariaj Hernandez MD [facility] f/u and cimzia               Passed - CMP or BMP in past 6 months     Recent Results (from the past 4392 hour(s))   Comp Metabolic Panel    Collection Time: 10/10/23 12:15 PM   Result Value Ref Range    Glucose 84 70 - 99 mg/dL    Sodium 142 136 - 145 mmol/L    Potassium 4.8 3.5 - 5.1  mmol/L    Chloride 112 98 - 112 mmol/L    CO2 24.0 21.0 - 32.0 mmol/L    Anion Gap 6 0 - 18 mmol/L    BUN 36 (H) 7 - 18 mg/dL    Creatinine 1.25 (H) 0.55 - 1.02 mg/dL    BUN/CREA Ratio 28.8 (H) 10.0 - 20.0    Calcium, Total 9.3 8.5 - 10.1 mg/dL    Calculated Osmolality 302 (H) 275 - 295 mOsm/kg    eGFR-Cr 44 (L) >=60 mL/min/1.73m2    ALT 23 13 - 56 U/L    AST 18 15 - 37 U/L    Alkaline Phosphatase 55 55 - 142 U/L    Bilirubin, Total 0.3 0.1 - 2.0 mg/dL    Total Protein 7.6 6.4 - 8.2 g/dL    Albumin 3.7 3.4 - 5.0 g/dL    Globulin  3.9 2.8 - 4.4 g/dL    A/G Ratio 0.9 (L) 1.0 - 2.0    Patient Fasting for CMP? No      *Note: Due to a large number of results and/or encounters for the requested time period, some results have not been displayed. A complete set of results can be found in Results Review.               Passed - In person appointment or virtual visit in the past 6 months     Recent Outpatient Visits              1 week ago Ear lesion    [facility] Carlito Garcia MD    Office Visit    2 weeks ago Rheumatoid arthritis with negative rheumatoid factor, involving unspecified site ([facility])    [facility]    Nurse Only    4 weeks ago Bilateral impacted cerumen    [facility] Carlito Garcia MD    Office Visit    1 month ago Rheumatoid arthritis with negative rheumatoid factor, involving unspecified site ([facility])    [facility]    Nurse Only    2 months ago Rheumatoid arthritis with negative rheumatoid factor, involving unspecified site ([facility])    [facility]    Nurse Only          Future Appointments         Provider Department Appt Notes    In 2 weeks [facility] RN RHEUMATOLOGY [facility] Cimzia injection    In 1 month Marija Hernandez MD [facility]  [address], [place] f/u and cimzia

## 2023-12-18 ENCOUNTER — PATIENT MESSAGE (OUTPATIENT)
Dept: INTERNAL MEDICINE CLINIC | Facility: CLINIC | Age: 78
End: 2023-12-18

## 2023-12-19 NOTE — TELEPHONE ENCOUNTER
From: Merline Flores  To: Marie Bass  Sent: 12/18/2023 7:05 AM CST  Subject: Glucose Monitoring    I have not been able to check my blood sugar for quite a while. I am not able to get test strips. I contacted [address] [place] [address] several weeks ago and was informed my insurance changed the location from whatever it used to be to [facility]". This required a new doctors authorization which they sent to Dr. Yosi Sy. I updated it to Dr. Faby Hunt. They continued to try to contact Dr. Yosi Sy. I tried to update to you, but I haven't heard from [address] [place] [address] in over 2 weeks. I'm not sure they ever contacted you. Could you please contact [facility]. On another note, I received a text from [employer] stating they cannot renew my Zes without a doctors authorization. I don't know what Zes is?

## 2023-12-20 ENCOUNTER — TELEPHONE (OUTPATIENT)
Dept: INTERNAL MEDICINE CLINIC | Facility: CLINIC | Age: 78
End: 2023-12-20

## 2023-12-20 NOTE — TELEPHONE ENCOUNTER
Verified name and . Patient is asking for Dr. Damian Herbert to prescribed blood sugar test strips. She states that she used to get them from another provider.     Medication pended for your review and approval.

## 2023-12-22 RX ORDER — CALCIUM CITRATE/VITAMIN D3 200MG-6.25
1 TABLET ORAL DAILY
Qty: 100 STRIP | Refills: 3 | Status: SHIPPED | OUTPATIENT
Start: 2023-12-22 | End: 2023-12-26

## 2023-12-26 RX ORDER — CALCIUM CITRATE/VITAMIN D3 200MG-6.25
1 TABLET ORAL DAILY
Qty: 100 STRIP | Refills: 3 | Status: SHIPPED | OUTPATIENT
Start: 2023-12-26

## 2024-01-02 ENCOUNTER — NURSE ONLY (OUTPATIENT)
Dept: RHEUMATOLOGY | Facility: CLINIC | Age: 79
End: 2024-01-02
Payer: COMMERCIAL

## 2024-01-02 DIAGNOSIS — M06.00 RHEUMATOID ARTHRITIS WITH NEGATIVE RHEUMATOID FACTOR, INVOLVING UNSPECIFIED SITE (HCC): Primary | ICD-10-CM

## 2024-01-02 PROCEDURE — 96372 THER/PROPH/DIAG INJ SC/IM: CPT | Performed by: INTERNAL MEDICINE

## 2024-01-02 NOTE — PROGRESS NOTES
Name and  verified. Pt aware she was to receive injection of Cimza. Pt denies current illness or antibiotic use. Injections given in bilateral lower abdomen and pt tolerated well. Possible local side effects discussed with pt. Pt aware to follow up with provider in 1 month after labs completed.

## 2024-01-15 ENCOUNTER — TELEPHONE (OUTPATIENT)
Dept: INTERNAL MEDICINE CLINIC | Facility: CLINIC | Age: 79
End: 2024-01-15

## 2024-01-24 RX ORDER — METOPROLOL TARTRATE 50 MG/1
50 TABLET, FILM COATED ORAL 2 TIMES DAILY
Qty: 200 TABLET | Refills: 3 | Status: SHIPPED | OUTPATIENT
Start: 2024-01-24

## 2024-01-24 NOTE — TELEPHONE ENCOUNTER
Protocol Failed/ No Protocol    Requested Prescriptions   Pending Prescriptions Disp Refills    METOPROLOL TARTRATE 50 MG Oral Tab [Pharmacy Med Name: Metoprolol Tartrate 50 MG Oral Tablet] 200 tablet 2     Sig: TAKE 1 TABLET BY MOUTH TWICE  DAILY       Hypertensive Medications Protocol Failed - 1/23/2024  9:12 PM        Failed - Last BP reading less than 140/90     BP Readings from Last 1 Encounters:   08/15/23 (!) 189/73               Failed - EGFRCR or GFRNAA > 50     GFR Evaluation  EGFRCR: 44 , resulted on 10/10/2023          Passed - In person appointment in the past 12 or next 3 months     Recent Outpatient Visits              3 weeks ago Rheumatoid arthritis with negative rheumatoid factor, involving unspecified site ([facility])    [facility]    Nurse Only    [facility] month ago [facility]    Office Visit    1 month ago Rheumatoid arthritis with negative rheumatoid factor, involving unspecified site ([facility])    [facility]    Nurse Only    2 months ago Bilateral impacted cerumen    [facility]    Office Visit    2 months ago Rheumatoid arthritis with negative rheumatoid factor, involving unspecified site ([facility])    [facility]    Nurse Only          Future Appointments         Provider Department Appt Notes    In 2 weeks Marija Hernandez MD [facility] f/u and cimzia               Passed - CMP or BMP in past 6 months     Recent Results (from the past 4392 hour(s))   Comp Metabolic Panel    Collection Time: 10/10/23 12:15 PM   Result Value Ref Range    Glucose 84 70 - 99 mg/dL    Sodium 142 136 - 145 mmol/L    Potassium 4.8 3.5 - 5.1 mmol/L    Chloride 112 98 - 112 mmol/L    CO2 24.0 21.0 - 32.0 mmol/L    Anion Gap 6 0 - 18  mmol/L    BUN 36 (H) 7 - 18 mg/dL    Creatinine 1.25 (H) 0.55 - 1.02 mg/dL    BUN/CREA Ratio 28.8 (H) 10.0 - 20.0    Calcium, Total 9.3 8.5 - 10.1 mg/dL    Calculated Osmolality 302 (H) 275 - 295 mOsm/kg    eGFR-Cr 44 (L) >=60 mL/min/1.73m2    ALT 23 13 - 56 U/L    AST 18 15 - 37 U/L    Alkaline Phosphatase 55 55 - 142 U/L    Bilirubin, Total 0.3 0.1 - 2.0 mg/dL    Total Protein 7.6 6.4 - 8.2 g/dL    Albumin 3.7 3.4 - 5.0 g/dL    Globulin  3.9 2.8 - 4.4 g/dL    A/G Ratio 0.9 (L) 1.0 - 2.0    Patient Fasting for CMP? No      *Note: Due to a large number of results and/or encounters for the requested time period, some results have not been displayed. A complete set of results can be found in Results Review.               Passed - In person appointment or virtual visit in the past 6 months     Recent Outpatient Visits              3 weeks ago Rheumatoid arthritis with negative rheumatoid factor, involving unspecified site ([facility])    [facility]    Nurse Only    1 month ago [facility]    Office Visit    1 month ago Rheumatoid arthritis with negative rheumatoid factor, involving unspecified site ([facility])    [facility]    Nurse Only    2 months ago Bilateral impacted cerumen    [facility] Carlito Garcia MD    Office Visit    2 months ago Rheumatoid arthritis with negative rheumatoid factor, involving unspecified site ([facility])    [facility]    Nurse Only          Future Appointments         Provider Department Appt Notes    In 2 weeks Marija Hernandez MD [facility] f/u and cimzia                    Future Appointments         Provider Department Appt Notes    In 2 weeks Marija Hernandez MD [facility]  f/u and cimzia          Recent Outpatient Visits              3 weeks ago Rheumatoid arthritis with negative rheumatoid factor, involving unspecified site ([facility])    [facility]    Nurse Only    1 month ago [facility]    Office Visit    1 month ago Rheumatoid arthritis with negative rheumatoid factor, involving unspecified site ([facility])    [facility] [place]    Nurse Only    2 months ago Bilateral impacted cerumen    [facility] [place] [facility]    Office Visit    2 months ago Rheumatoid arthritis with negative rheumatoid factor, involving unspecified site ([facility])    [facility]    Nurse Only

## 2024-02-07 ENCOUNTER — OFFICE VISIT (OUTPATIENT)
Dept: RHEUMATOLOGY | Facility: CLINIC | Age: 79
End: 2024-02-07
Payer: COMMERCIAL

## 2024-02-07 ENCOUNTER — LAB ENCOUNTER (OUTPATIENT)
Dept: LAB | Age: 79
End: 2024-02-07
Attending: INTERNAL MEDICINE
Payer: MEDICARE

## 2024-02-07 VITALS
DIASTOLIC BLOOD PRESSURE: 68 MMHG | HEIGHT: 58 IN | BODY MASS INDEX: 23.76 KG/M2 | HEART RATE: 76 BPM | WEIGHT: 113.19 LBS | RESPIRATION RATE: 16 BRPM | SYSTOLIC BLOOD PRESSURE: 119 MMHG

## 2024-02-07 DIAGNOSIS — M06.00 RHEUMATOID ARTHRITIS WITH NEGATIVE RHEUMATOID FACTOR, INVOLVING UNSPECIFIED SITE (HCC): Primary | ICD-10-CM

## 2024-02-07 DIAGNOSIS — R94.4 ABNORMAL CREATININE CLEARANCE GLOMERULAR FILTRATION: ICD-10-CM

## 2024-02-07 DIAGNOSIS — M06.00 RHEUMATOID ARTHRITIS WITH NEGATIVE RHEUMATOID FACTOR, INVOLVING UNSPECIFIED SITE (HCC): ICD-10-CM

## 2024-02-07 DIAGNOSIS — Z51.81 THERAPEUTIC DRUG MONITORING: ICD-10-CM

## 2024-02-07 DIAGNOSIS — M81.0 AGE-RELATED OSTEOPOROSIS WITHOUT CURRENT PATHOLOGICAL FRACTURE: ICD-10-CM

## 2024-02-07 LAB
ANION GAP SERPL CALC-SCNC: 5 MMOL/L (ref 0–18)
BUN BLD-MCNC: 27 MG/DL (ref 9–23)
BUN/CREAT SERPL: 24.3 (ref 10–20)
CALCIUM BLD-MCNC: 10 MG/DL (ref 8.7–10.4)
CHLORIDE SERPL-SCNC: 110 MMOL/L (ref 98–112)
CO2 SERPL-SCNC: 27 MMOL/L (ref 21–32)
CREAT BLD-MCNC: 1.11 MG/DL
CRP SERPL-MCNC: <0.4 MG/DL (ref ?–1)
DEPRECATED RDW RBC AUTO: 47.9 FL (ref 35.1–46.3)
EGFRCR SERPLBLD CKD-EPI 2021: 51 ML/MIN/1.73M2 (ref 60–?)
ERYTHROCYTE [DISTWIDTH] IN BLOOD BY AUTOMATED COUNT: 13.2 % (ref 11–15)
ERYTHROCYTE [SEDIMENTATION RATE] IN BLOOD: 12 MM/HR
FASTING STATUS PATIENT QL REPORTED: YES
GLUCOSE BLD-MCNC: 137 MG/DL (ref 70–99)
HCT VFR BLD AUTO: 34 %
HGB BLD-MCNC: 10.5 G/DL
MCH RBC QN AUTO: 30.5 PG (ref 26–34)
MCHC RBC AUTO-ENTMCNC: 30.9 G/DL (ref 31–37)
MCV RBC AUTO: 98.8 FL
OSMOLALITY SERPL CALC.SUM OF ELEC: 301 MOSM/KG (ref 275–295)
PLATELET # BLD AUTO: 292 10(3)UL (ref 150–450)
POTASSIUM SERPL-SCNC: 5 MMOL/L (ref 3.5–5.1)
RBC # BLD AUTO: 3.44 X10(6)UL
SODIUM SERPL-SCNC: 142 MMOL/L (ref 136–145)
WBC # BLD AUTO: 7.4 X10(3) UL (ref 4–11)

## 2024-02-07 PROCEDURE — 85027 COMPLETE CBC AUTOMATED: CPT

## 2024-02-07 PROCEDURE — 1160F RVW MEDS BY RX/DR IN RCRD: CPT | Performed by: INTERNAL MEDICINE

## 2024-02-07 PROCEDURE — 1125F AMNT PAIN NOTED PAIN PRSNT: CPT | Performed by: INTERNAL MEDICINE

## 2024-02-07 PROCEDURE — 3074F SYST BP LT 130 MM HG: CPT | Performed by: INTERNAL MEDICINE

## 2024-02-07 PROCEDURE — 96372 THER/PROPH/DIAG INJ SC/IM: CPT | Performed by: INTERNAL MEDICINE

## 2024-02-07 PROCEDURE — 1159F MED LIST DOCD IN RCRD: CPT | Performed by: INTERNAL MEDICINE

## 2024-02-07 PROCEDURE — 36415 COLL VENOUS BLD VENIPUNCTURE: CPT

## 2024-02-07 PROCEDURE — 86480 TB TEST CELL IMMUN MEASURE: CPT

## 2024-02-07 PROCEDURE — 86140 C-REACTIVE PROTEIN: CPT

## 2024-02-07 PROCEDURE — 85652 RBC SED RATE AUTOMATED: CPT

## 2024-02-07 PROCEDURE — 3008F BODY MASS INDEX DOCD: CPT | Performed by: INTERNAL MEDICINE

## 2024-02-07 PROCEDURE — 3078F DIAST BP <80 MM HG: CPT | Performed by: INTERNAL MEDICINE

## 2024-02-07 PROCEDURE — 99214 OFFICE O/P EST MOD 30 MIN: CPT | Performed by: INTERNAL MEDICINE

## 2024-02-07 PROCEDURE — 80048 BASIC METABOLIC PNL TOTAL CA: CPT

## 2024-02-07 NOTE — PROGRESS NOTES
Nikki Mensah is a 78 year old female.    HPI:     Chief Complaint   Patient presents with    Rheumatoid Arthritis    Medication Follow-Up     CIMZIA    Lab Results       I had the pleasure of seeing Nikki Mensah .   As you recall she is extremely pleasant 75-year-old who's a history of seronegative rheumatoid arthritis. Since then, she's been doing very well on  methotrexate 17.5 mg a week and sulfasalazine 500 mg once a day. She is doing well on lower the dose of her meds. She occl has wrist pain. She's not having a pain. She hasn't been walking. She's at the [facility] in the am.   She's going to a wedding in [place] and a cruise in jan.   Overall no interval illnesses.         7/23/2018  She's doing well. She's tolerating cimzia 400mg a months -   She's also on lelfunomide 20mg every day and ssz 1000mg tid.   She is not able to close her left hand completley - but not changed over the last 1 year. [facility] feels her pain is so much nile.r   Her right elbow also hurts.     10/29/2018  She is not having a flare. She does get 5/10 pain in her hands.   She doubled her leflunomide for 1 week while waiting for ssz - . D/w her not to do this in the future.   Her hands have the most issues.   She had her flu shot  No illnesses.   She doesn't feel like she can taper the ssz or lelfunomide yet. She still gets pains in her hands.     1/16/2019  She is doing well on cimiza 400mg a month, . She can't make a toatal fist with the left hand but it's getting better. She has 4/10 pain. She still has right wrist pain that's mild.   She feels better.   No coughs, no fevers.   She does pool work every day.     5/20/2019  She tripped 1 month ago - fractured her left2 humerus.   She's having left 1st toe.   She had sciatic in 2/2019 and 3/2019  - had two sx -    She ha sresidual  Left 1st toe numbness   She has lost stregnth in her left foot - she has a left foot drop   She has physical therapy for left leg.   Dr. Hoover , neurosx - - did her  back sx - before the sx - she did have some  problesm with the the left foot and toe - had a left foot drop  Sciatica pain is better  The RA is not bad. She feels it's mostly her arm being in a sling that she's not being able to do things.     8/26/2019  She had sciatica pian in 1/2019 0 and has had left foot drop since then. She is going to see if it gets better in a few years.   Her RA is under control.   Her left arm is healed now.   She is watching her walking and making sure she doesn't trip b/c of her left foot drop.   She finished PT for 4 months on left arm and foot.   She has 1/10 pain.   She is getting DEXA today.     1/22/2020   She is still trying to make a fist with her left hand- but almost but not quite. She doesn't have much pain. She has 3/10 in her left hand.   She still has difficulty walkign b/c of her brace. She had a lto of left nerve damage.   She does 2 miles a day. She still dose water aerobics every morning.     6/22/2020  She is oding well.   The arthriits was getting bad for a while b/c she skipped her march and April injeciton of cimzia. She restarted in may 2020.   She feels it the most in her right shoulder and right hand. Her pain is 6-7/10.   She feels moving her hand is really painful.   She doesn't noticed swelling.   She's had right shoulder pain since May or April.   The [facility] is opening but water aerobics - opening in 10/2020    1/25/2021  Her arthritis is not bad today. She hasn't gone back yet.   She's taking tyelnol 600mg for arthritis.   Her right shoulder is doing fine. Her right hand is doing real well. She can't make a fist with her right ahnd but not with her left hand.   She has about 3/10 pain.   She thinks her right shoulder pain was b/c she missed 3 months of cimzia shots.     7/21/2021  The summer is going quickly.   She feels pretty good. The left hand is always bothering her more than her right hand.   Her left wrist is tender.   She has 1/10 pain.   She is getting  her cimizia today.   Her right shoulder is ok now.   Her family is ok.   No side effects with covid vaccine.   She is forgetting  To take afternoon ssz - and she is takign 1000mg bid really.     12/28/2021   She is doing well, she has pain I her left 1-5th knuckles it's just started 1 week.   She has a little tenderness. She has 3/10 pain.   She has been on the medicare advantage plan since 1/2021 - so now it's $650 -     6/27/2022  She has a left drop foot and getting peroneal nerve sx at [facility] on 6/30.   She stopped taking some of her meds to prepare for the surgery. She stopped metoprolol so her bp is very high right now.   She called surgeons office about cimzia - told to hold 1 week before and 2 weeks after - so she is taking if on 7/14.   She's taking leflunomide 20mg a day and ssz 1000mg bid - this is ok.   Her pain is ok - occl left hand pain. Overall doing well.       1/13/2023  She has left common peroneal nerve decompiression and neuroplasty on 6/30/2022 - seh ahs hs xon chronic left foot drop that worsene.   She has more right hand pain - in he raplm and her right 4th finger. She gets a right 4th trigger ringer.   It's not bad today.   She has ano pain today.   She is mild soresns in her right 4th finger.   She overall is ok and stable on the cimzia , leflunomide and ssz .     8/9/2023   She still does her treatdmill daily   She gets a local nerve   She still does her water aerobics   She can move her left foot up more than last time - partialy better.   Her fingers doenst' bothe rher .   She can still makea  fist but her left one is better.   Her right 4th finger is better.     2/7/2024  Her right hand hurts the most. She can almost make a fist but it's harder than before.   Her left  hand hurts less.   The hands don't hurt all the time.   Sometimes her right 4th finger hurts at night - that's the worst of it.     She's still doing the water aerobics and treadmill.   HISTORY:  Past Medical History:    Diagnosis Date    Amblyopia 1952    R eye vision impaired since childhood    Anemia 07/2017    mixed iron and B12 deficiency    CAD (coronary artery disease) 2007    acute MI and had CABG in 3/2007     Cataracts, bilateral 2004    OU; sees Dr. 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     Gallstone 02/18/2020    Incidental finding 2.6 cm gallstone on x-ray lumbar spine 2/18/20.    Gallstones 03/2022    incidental on MRI lumbar    Humerus fracture 04/2019    Fx L humerus--sling per Dr. Marks    Hyperlipidemia, mixed     Hypertension, essential     Lumbar disc disease 2019    Osteoarthritis     Osteopenia     dexa 2008-osteopenia.     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     Recurrent cold sores     takes acyclovir prn (Dr Briones rx in about 2014)    Rheumatoid arthritis ([facility]) 2010    sees Dr Trammell    Type 2 diabetes mellitus ([facility]) 2005    oral medication.    Vitamin B12 deficiency 07/2017    Vitamin B12 level low at 126 on 7/28/17.      Social Hx Reviewed   Family Hx Reviewed     Medications (Active prior to today's visit):  Current Outpatient Medications   Medication Sig Dispense Refill    metoprolol tartrate 50 MG Oral Tab Take 1 tablet (50 mg total) by mouth 2 (two) times daily. 200 tablet 3    Glucose Blood (TRUE METRIX BLOOD GLUCOSE TEST) In Vitro Strip 1 each by In Vitro route daily. Use to check blood sugar once daily 100 strip 3    Omega-3 Fatty Acids (FISH OIL) 1200 MG Oral Cap Take 2 capsules by mouth in the morning and 2 capsules before bedtime.      lisinopril 10 MG Oral Tab Take 1 tablet (10 mg total) by mouth daily. 90 tablet 1    gabapentin 600 MG Oral Tab Take 1 tablet (600 mg total) by mouth 2 (two) times daily. 180 tablet 3    hydroCHLOROthiazide 12.5 MG Oral Tab Take 1 tablet  (12.5 mg total) by mouth daily. 90 tablet 3    simvastatin 40 MG Oral Tab Take 1 tablet (40 mg total) by mouth nightly. 90 tablet 3    sulfaSALAzine 500 MG Oral Tab Take 3 tablets (1,500 mg total) by mouth 2 (two) times daily. 540 tablet 3    leflunomide 20 MG Oral Tab Take 1 tablet (20 mg total) by mouth daily. 100 tablet 2    metFORMIN 500 MG Oral Tab Take 1 tablet (500 mg total) by mouth daily with breakfast. 90 tablet 3    aspirin 81 MG Oral Tab EC Take 1 tablet (81 mg total) by mouth once.      LYSINE OR Take 1 tablet by mouth daily.      Certolizumab Pegol (CIMZIA PREFILLED) 2 X 200 MG/ML Subcutaneous Kit Inject 400 mg into the skin every 28 days. 1 kit 3    Acetaminophen  MG Oral Tab CR Take 2 tablets (1,300 mg total) by mouth in the morning and 2 tablets (1,300 mg total) before bedtime.  1    Calcium Carb-Cholecalciferol (CALCIUM 500 +D OR) Take 1,200 mg by mouth. Take 1 tab. Every day         Allergies:  No Known Allergies      ROS:   All other ROS are negative.     PHYSICAL EXAM:   HEENT: Clear oropharynx, no oral ulcers, EOM intact, clear sclear, PERRLA, pleasant, no acute distress, no CAD, no neck tendnerness, good ROM,   No rashes  CVS: RRR, no murmurs  RS: CTAB, no crackles, no rhonchi  ABD: Soft No  Non jotnt pian   nontender, no HSM felt, BS positive  Joint exam:   right wrist and right shoudler not tender , decreased felxion   Left wrist not tende rwith flexion.   B/l thumbs not tender -   Mild tender in shoulders  But rom inatct   Left 3rd finger mild swelling  can't close left  hand completely, still hard fro her.but better  Can close right   Right elbow  contracture deformity - better  -   Left foot drop -  No heel pain   No right calf tendnerss.    No tendneress in mtps at this time/   No left hip tendneress.   Squeeze test negative -   No edema    Component      Latest Ref Rng 2/7/2024   Glucose      70 - 99 mg/dL 137 (H)    Sodium      136 - 145 mmol/L 142    Potassium      3.5 - 5.1  mmol/L 5.0    Chloride      98 - 112 mmol/L 110    Carbon Dioxide, Total      21.0 - 32.0 mmol/L 27.0    ANION GAP      0 - 18 mmol/L 5    BUN      9 - 23 mg/dL 27 (H)    CREATININE      0.55 - 1.02 mg/dL 1.11 (H)    BUN/CREATININE RATIO      10.0 - 20.0  24.3 (H)    CALCIUM      8.7 - 10.4 mg/dL 10.0    CALCULATED OSMOLALITY      275 - 295 mOsm/kg 301 (H)    EGFR      >=60 mL/min/1.73m2 51 (L)    Patient Fasting for BMP? Yes    WBC      4.0 - 11.0 x10(3) uL 7.4    RBC      3.80 - 5.30 x10(6)uL 3.44 (L)    Hemoglobin      12.0 - 16.0 g/dL 10.5 (L)    Hematocrit      35.0 - 48.0 % 34.0 (L)    MCV      80.0 - 100.0 fL 98.8    MCH      26.0 - 34.0 pg 30.5    MCHC      31.0 - 37.0 g/dL 30.9 (L)    RDW      11.0 - 15.0 % 13.2    RDW-SD      35.1 - 46.3 fL 47.9 (H)    Platelet Count      150.0 - 450.0 10(3)uL 292.0    SED RATE      0 - 30 mm/Hr 12    C-REACTIVE PROTEIN      <1.00 mg/dL <0.40       Legend:  (H) High  (L) Low    3/23/2019 mri lumbar spine     Large left paracentral disc protrusion at L4-L5 with effacement of the left lateral and subarticular recesses and mass effect upon the left L4 nerve root is new since 2/4/2019.     Extension of the disc protrusion into the left L4-L5 neural foramen with severe narrowing of the left neural foramen.     Postoperative changes of a left L4-L5 laminotomy.     Enhancement of the left L4 and L5 nerve roots may be secondary to neuritis given recent intervention.    1/16/2019 - dexa  LEFT FEMORAL NECK               BMD:    0.719 gm/sq. cm.            T SCORE:         -1.2               PA LUMBAR SPINE (L1 - L4)               BMD:    0.872 gm/sq. cm.            T SCORE:         -1.6            2/14/2022 - DEXA    LEFT FEMORAL NECK   BMD: 0.727 gm/sq. cm. T SCORE: -1.1 Z SCORE: 1.0       LEFT TOTAL HIP   BMD: 0.931 gm/sq. cm. T SCORE: -0.1 Z SCORE: 1.7       PA LUMBAR SPINE (L1 - L4)   BMD: 0.981 gm/sq. cm. T SCORE: -0.6 Z SCORE: 1.8   ASSESSMENT/PLAN:     1. RA (rheumatoid  arthritis) (HCC)  . Seronegative rheumatoid arthritis -   - stable , in remission - gradual changes have happenin her hands    cont. on lelfunomide 20mg a day and ssz 1000mg bid  Cont. cimzia 400mg - started  June 2018 -   Check labs in 6months   - return to clinic in 6 months.     Mostly her function issues is her left foot driop from back sx -    -switched to leflunomide 10mg a day on 3/2016 - from methotrexate 15mg a week   - stopped leflunomide in beginning of 3/2017 - b/c of expense but went back on b/c of her pain being bad   3/2018 - quantiferon gold and hep b and c studies. ,    - cont. h2o aerobics when it reopens   She wants to switch to  Sulfasalazine b/c of insurance coverage - she will switch in march 2017    2.  History of right eye visual loss - does not want to be on Plaquenil, f/u with optho, gets eye exam end of the year - but also gets it for her diabetes   4.  CAD status post CABG a left leg venous grafting-stable   5.  Diabetes type 2-controlled, continue meds  6. Anemia - EDWIN - egd/cscope ok - f/u hematology - but could also be from chronic disease -   7. Osteopenia - 2008 - with hx of RA - makes her high risk for osteoporosiss - given RA activity -  1/2019 -   FRAX  Score   The ten year probability of fracture (%)without BM  Major osteoporosis  19%  Hip fracture 7.7  Check DEXA  - due for dexa in 2/2024 -     8. Left leg sciatica s/p 2 back sx with persistent left foot drop - done by dr. Hoover   - pain is better   Causing her to trip   - gababapnetin  9. 6/29/2021- cataract in right eye removed, 1/2021 - had left cataract removed.   10.s/p left common peroneal nerve decompression and neuroplasty on 6/30/2022- she will repeat emg in 7/2023 - and wait a litlte longer -not bettery yet.   No left leg pain       Summary:  1. Cont.  Sulfasalazine 1000mg twice a day -     2. Cont.  lelfunomide  20mg a day -    3. Cont.  cimzi 400mg a month. -   4. Return to clinic in 6 months.   5. Labs today and then  in 6 months.      Marija Hernandez MD  2/7/2024   4:42 PM

## 2024-02-07 NOTE — PROGRESS NOTES
Name and  verified. Pt aware she was to receive injection of Cimza. Injections given Bilateral Lower Abdomen subcutaneous and pt tolerated well. Possible local side effects discussed with pt. Pt aware to follow up in 4 weeks for next injection and to follow up with provider in 4 month after labs completed.

## 2024-02-07 NOTE — TELEPHONE ENCOUNTER
Requested Prescriptions     Pending Prescriptions Disp Refills    SULFASALAZINE 500 MG Oral Tab [Pharmacy Med Name: SULFASALAZINE  500MG  TAB] 400 tablet 2     Sig: TAKE 2 TABLETS BY MOUTH TWICE  DAILY     Future Appointments   Date Time Provider Department Center   2/7/2024  4:30 PM Marija Hernandez MD [facility]     LOV: 8/9/2023  Last Refilled:5/22/23 #[id number]     Summary:  1. Cont.  Sulfasalazine 1000mg twice a day -     2. Cont.  lelfunomide  20mg a day -    3. Cont.  cimzi 400mg a month. -   4. Return to clinic in 6 months.   5. Labs today and then in 6 months.      Marija Hernandez MD  8/9/2023   3:08 PM  - Reviewed [place]- [place]  through Epic

## 2024-02-08 RX ORDER — SULFASALAZINE 500 MG/1
1000 TABLET ORAL 2 TIMES DAILY
Qty: 400 TABLET | Refills: 2 | Status: SHIPPED | OUTPATIENT
Start: 2024-02-08

## 2024-02-09 LAB
M TB IFN-G CD4+ T-CELLS BLD-ACNC: 0.01 IU/ML
M TB TUBERC IFN-G BLD QL: NEGATIVE
M TB TUBERC IGNF/MITOGEN IGNF CONTROL: >10 IU/ML
QFT TB1 AG MINUS NIL: 0.02 IU/ML
QFT TB2 AG MINUS NIL: 0.02 IU/ML

## 2024-02-22 ENCOUNTER — NURSE TRIAGE (OUTPATIENT)
Dept: INTERNAL MEDICINE CLINIC | Facility: CLINIC | Age: 79
End: 2024-02-22

## 2024-02-22 NOTE — TELEPHONE ENCOUNTER
Action Requested: Summary for Provider     []  Critical Lab, Recommendations Needed  [] Need Additional Advice  []   FYI    []   Need Orders  [] Need Medications Sent to Pharmacy  []  Other     SUMMARY: Per protocol advised : Office visit '  Future Appointments   Date Time Provider Department Center   2024  1:00 PM Windy Hernandez APRN [facility]   3/7/2024  2:00 PM [facility] RN [facility]   3/26/2024 11:20 AM Lane [facility]   2024  2:30 PM Marija Hernandez MD [facility]           Reason for call: cough for 2 weeks   Onset: Data Unavailable    Patient calling ( identified name and  ) in regards to message below    Has slight productive cough with clear sputum,  nasal drainage    She is usually able to sleep at night but cough is not improving     Denies fever, no chest pain , no SOB         Nikki Mensah   to  Em Triage Support (supporting Lane Khan MD)         24  8:14 PM  I woke up with a cough on 24. After a couple of days I started taking 400mg [facility] Chest Congestion Relief (Guaifenesin) Expectorant. It didn't seem to help.   I then started taking [facility] Cold & Flu maximum strength (325mg acetaminophen, 10mg dextromethorphan HBr, 200mg Guaifenesin, 5mg Phenylephrine HCI. Now I'm alternating between the two. My cough isn't getting any better.   Can you prescribe something for me to  at [facility] at [facility] in [place]?   Or could I come in for an appointment?  Or should I just be patient?  It's just been 11 days.   This encounter is not signed. The conversation may still be ongoing.  Reason for Disposition   Nasal discharge present > 10 days    Protocols used: Cough-A-OH

## 2024-02-23 ENCOUNTER — OFFICE VISIT (OUTPATIENT)
Dept: INTERNAL MEDICINE CLINIC | Facility: CLINIC | Age: 79
End: 2024-02-23
Payer: COMMERCIAL

## 2024-02-23 VITALS
SYSTOLIC BLOOD PRESSURE: 117 MMHG | HEART RATE: 76 BPM | WEIGHT: 109 LBS | BODY MASS INDEX: 22.88 KG/M2 | OXYGEN SATURATION: 95 % | HEIGHT: 58 IN | DIASTOLIC BLOOD PRESSURE: 75 MMHG

## 2024-02-23 DIAGNOSIS — R05.1 ACUTE COUGH: Primary | ICD-10-CM

## 2024-02-23 PROCEDURE — 3078F DIAST BP <80 MM HG: CPT

## 2024-02-23 PROCEDURE — 1160F RVW MEDS BY RX/DR IN RCRD: CPT

## 2024-02-23 PROCEDURE — 3074F SYST BP LT 130 MM HG: CPT

## 2024-02-23 PROCEDURE — 3008F BODY MASS INDEX DOCD: CPT

## 2024-02-23 PROCEDURE — 1159F MED LIST DOCD IN RCRD: CPT

## 2024-02-23 PROCEDURE — 1126F AMNT PAIN NOTED NONE PRSNT: CPT

## 2024-02-23 PROCEDURE — 99213 OFFICE O/P EST LOW 20 MIN: CPT

## 2024-02-23 RX ORDER — BENZONATATE 100 MG/1
100 CAPSULE ORAL 2 TIMES DAILY PRN
Qty: 20 CAPSULE | Refills: 0 | Status: SHIPPED | OUTPATIENT
Start: 2024-02-23

## 2024-02-23 NOTE — PATIENT INSTRUCTIONS
Spoon of honey a few times a day  Salt water gargles  May use flonase or saline nasal spray for congestion and post nasal drip

## 2024-02-23 NOTE — PROGRESS NOTES
Subjective:   Nikki Mensah is a 78 year old female who presents for Cough (Cough for 12 days, no fever)     12 days of symptoms  COVID test was negative at the beginning   Symptoms have not changed, always cough and a little clear nasal drainage- cough is a little better today, today is her best day so far  Brings up some phlegm to the back of the throat and then she swallows it   Occasionally gets clear sputum out or clear yellow   Some post nasal drip  Sleeping well  No fever or chills   No shortness of breath and underlying no lung conditions     Per RN triage note  Has slight productive cough with clear sputum,  nasal drainage    She is usually able to sleep at night but cough is not improving      Denies fever, no chest pain , no SOB     History/Other:    Chief Complaint Reviewed and Verified  Nursing Notes Reviewed and   Verified  Tobacco Reviewed  Allergies Reviewed  Medications Reviewed    Problem List Reviewed  Medical History Reviewed  Surgical History   Reviewed  OB Status Reviewed  Family History Reviewed         Tobacco:  She smoked tobacco in the past but quit greater than 12 months ago.  Social History    Tobacco Use      Smoking status: Former        Packs/day: 1.00        Years: 16.00        Additional pack years: 0.00        Total pack years: 16.00        Types: Cigarettes        Quit date: 1975        Years since quittin.1        Passive exposure: Past      Smokeless tobacco: Never       Current Outpatient Medications   Medication Sig Dispense Refill    benzonatate 100 MG Oral Cap Take 1 capsule (100 mg total) by mouth 2 (two) times daily as needed. 20 capsule 0    sulfaSALAzine 500 MG Oral Tab Take 2 tablets (1,000 mg total) by mouth 2 (two) times daily. 400 tablet 2    metoprolol tartrate 50 MG Oral Tab Take 1 tablet (50 mg total) by mouth 2 (two) times daily. 200 tablet 3    Glucose Blood (TRUE METRIX BLOOD GLUCOSE TEST) In Vitro Strip 1 each by In Vitro route daily. Use to  check blood sugar once daily 100 strip 3    Omega-3 Fatty Acids (FISH OIL) 1200 MG Oral Cap Take 2 capsules by mouth in the morning and 2 capsules before bedtime.      lisinopril 10 MG Oral Tab Take 1 tablet (10 mg total) by mouth daily. 90 tablet 1    gabapentin 600 MG Oral Tab Take 1 tablet (600 mg total) by mouth 2 (two) times daily. 180 tablet 3    hydroCHLOROthiazide 12.5 MG Oral Tab Take 1 tablet (12.5 mg total) by mouth daily. 90 tablet 3    simvastatin 40 MG Oral Tab Take 1 tablet (40 mg total) by mouth nightly. 90 tablet 3    leflunomide 20 MG Oral Tab Take 1 tablet (20 mg total) by mouth daily. 100 tablet 2    metFORMIN 500 MG Oral Tab Take 1 tablet (500 mg total) by mouth daily with breakfast. 90 tablet 3    aspirin 81 MG Oral Tab EC Take 1 tablet (81 mg total) by mouth once.      LYSINE OR Take 1 tablet by mouth daily.      Certolizumab Pegol (CIMZIA PREFILLED) 2 X 200 MG/ML Subcutaneous Kit Inject 400 mg into the skin every 28 days. 1 kit 3    Acetaminophen  MG Oral Tab CR Take 2 tablets (1,300 mg total) by mouth in the morning and 2 tablets (1,300 mg total) before bedtime.  1    Calcium Carb-Cholecalciferol (CALCIUM 500 +D OR) Take 1,200 mg by mouth. Take 1 tab. Every day           Review of Systems:  Review of Systems   Constitutional: Negative.    HENT:  Positive for rhinorrhea.    Respiratory:  Positive for cough. Negative for shortness of breath.    Cardiovascular: Negative.    Gastrointestinal: Negative.    Skin: Negative.    Neurological: Negative.          Objective:   /75   Pulse 76   Ht 4' 10\" (1.473 m)   Wt 109 lb (49.4 kg)   SpO2 95%   BMI 22.78 kg/m²  Estimated body mass index is 22.78 kg/m² as calculated from the following:    Height as of this encounter: 4' 10\" (1.473 m).    Weight as of this encounter: 109 lb (49.4 kg).  Physical Exam  Vitals reviewed.   Constitutional:       General: She is not in acute distress.     Appearance: Normal appearance. She is well-developed.    HENT:      Right Ear: Tympanic membrane normal.      Left Ear: Tympanic membrane normal.      Nose: Nose normal.      Mouth/Throat:      Mouth: Mucous membranes are moist.      Pharynx: Oropharynx is clear.   Cardiovascular:      Rate and Rhythm: Normal rate and regular rhythm.      Heart sounds: Normal heart sounds.   Pulmonary:      Effort: Pulmonary effort is normal.      Breath sounds: Normal breath sounds.   Lymphadenopathy:      Cervical: No cervical adenopathy.   Skin:     General: Skin is warm and dry.   Neurological:      Mental Status: She is alert and oriented to person, place, and time.       Assessment & Plan:   1. Acute cough (Primary)  -     Benzonatate; Take 1 capsule (100 mg total) by mouth 2 (two) times daily as needed.  Dispense: 20 capsule; Refill: 0  Spoon of honey a few times a day  Salt water gargles  May use flonase or saline nasal spray for congestion and post nasal drip  Notify the office of worsening symptoms     ANGIE Tomas, 2/23/2024, 12:57 PM

## 2024-03-01 RX ORDER — LISINOPRIL 10 MG/1
10 TABLET ORAL DAILY
Qty: 100 TABLET | Refills: 3 | Status: SHIPPED | OUTPATIENT
Start: 2024-03-01

## 2024-03-01 NOTE — TELEPHONE ENCOUNTER
Please review; protocol failed/No Protocol    No Active/Future labs pended   Requested Prescriptions   Pending Prescriptions Disp Refills    metFORMIN 500 MG Oral Tab [Pharmacy Med Name: metFORMIN HCl 500 MG Oral Tablet] 100 tablet 2     Sig: Take 1 tablet (500 mg total) by mouth daily with breakfast.       Diabetes Medication Protocol Failed - 2/29/2024  4:25 AM        Failed - Last A1C < 7.5 and within past 6 months     Lab Results   Component Value Date    A1C 5.4 06/21/2023             Passed - In person appointment or virtual visit in the past 6 mos or appointment in next 3 mos     Recent Outpatient Visits              1 week ago Acute cough    [facility] Windy Hernandez APRN    Office Visit    3 weeks ago Rheumatoid arthritis with negative rheumatoid factor, involving unspecified site ([facility])    [facility] Marija Hernandez MD    Office Visit    1 month ago Rheumatoid arthritis with negative rheumatoid factor, involving unspecified site ([facility])    [facility]    Nurse Only    2 months ago Ear lesion    [facility] Carlito Garcia MD    Office Visit    3 months ago Rheumatoid arthritis with negative rheumatoid factor, involving unspecified site ([facility])    [facility]    Nurse Only          Future Appointments         Provider Department Appt Notes    In 6 days [facility] RN RHEUMATOLOGY [facility] Cimzia injection    In 3 weeks Lane Khan MD [facility]     In 5 months Marija Hernandez MD [facility] Pain level               Passed - Microalbumin procedure in past 12 months or taking ACE/ARB        Passed - EGFRCR or GFRNAA > 50     GFR Evaluation  EGFRCR: 51 , resulted on  2/7/2024          Passed - GFR in the past 12 months         Signed Prescriptions Disp Refills    lisinopril 10 MG Oral Tab 100 tablet 3     Sig: Take 1 tablet (10 mg total) by mouth daily.       Hypertension Medications Protocol Passed - 2/29/2024  4:25 AM        Passed - CMP or BMP in past 12 months        Passed - Last BP reading less than 140/90     BP Readings from Last 1 Encounters:   02/23/24 117/75               Passed - In person appointment or virtual visit in the past 12 mos or appointment in next 3 mos     Recent Outpatient Visits              1 week ago Acute cough    [facility] Windy Hernandez APRN    Office Visit    3 weeks ago Rheumatoid arthritis with negative rheumatoid factor, involving unspecified site ([facility])    [facility] Marija Hernandez MD    Office Visit    1 month ago Rheumatoid arthritis with negative rheumatoid factor, involving unspecified site ([facility])    [facility]    Nurse Only    2 months ago Ear lesion    [facility] Carlito Garcia MD    Office Visit    3 months ago Rheumatoid arthritis with negative rheumatoid factor, involving unspecified site ([facility])    [facility]    Nurse Only          Future Appointments         Provider Department Appt Notes    In 6 days [facility] BRIAN RHEUMATOLOGY [facility] Cimzia injection    In 3 weeks Lane Khan MD [facility]     In 5 months Marija Hernandez MD [facility] Pain level               Passed - EGFRCR or GFRNAA > 50     GFR Evaluation  EGFRCR: 51 , resulted on 2/7/2024             Future Appointments         Provider Department Appt Notes    In 6 days [facility] BRIAN RHEUMATOLOGY [facility],  [facility] Cimzia injection    In 3 weeks Lane Khan MD [facility]     In 5 months Marija Hernandez MD [facility] Pain level          Recent Outpatient Visits              1 week ago Acute cough    [facility] Windy Hernandez APRN    Office Visit    3 weeks ago Rheumatoid arthritis with negative rheumatoid factor, involving unspecified site ([facility])    [facility] Marija Hernandez MD    Office Visit    1 month ago Rheumatoid arthritis with negative rheumatoid factor, involving unspecified site ([facility])    [facility]    Nurse Only    2 months ago Ear lesion    [facility] Carlito Garcia MD    Office Visit    3 months ago Rheumatoid arthritis with negative rheumatoid factor, involving unspecified site ([facility])    [facility]    Nurse Only

## 2024-03-01 NOTE — TELEPHONE ENCOUNTER
Refill passed per [facility] protocol.  Requested Prescriptions   Pending Prescriptions Disp Refills    LISINOPRIL 10 MG Oral Tab [Pharmacy Med Name: Lisinopril 10 MG Oral Tablet] 80 tablet 3     Sig: TAKE 1 TABLET BY MOUTH DAILY       Hypertension Medications Protocol Passed - 2/29/2024  4:25 AM        Passed - CMP or BMP in past 12 months        Passed - Last BP reading less than 140/90     BP Readings from Last 1 Encounters:   02/23/24 117/75               Passed - In person appointment or virtual visit in the past 12 mos or appointment in next 3 mos     Recent Outpatient Visits              1 week ago Acute cough    [facility] Windy Hernandez APRN    Office Visit    3 weeks ago Rheumatoid arthritis with negative rheumatoid factor, involving unspecified site ([facility])    [facility] Marija Hernandez MD    Office Visit    1 month ago Rheumatoid arthritis with negative rheumatoid factor, involving unspecified site ([facility])    [facility]    Nurse Only    2 months ago Ear lesion    [facility] Carlito Garcia MD    Office Visit    3 months ago Rheumatoid arthritis with negative rheumatoid factor, involving unspecified site ([facility])    [facility]    Nurse Only          Future Appointments         Provider Department Appt Notes    [facility] days [facility] Cimzia injection    In 3 weeks Lane Khan MD [facility]     In 5 months Marija Hernandez MD [facility] Pain level               Passed - EGFRCR or GFRNAA > 50     GFR Evaluation  EGFRCR: 51 , resulted on 2/7/2024            METFORMIN 500 MG Oral Tab [Pharmacy Med Name: metFORMIN HCl 500 MG Oral Tablet] 100  tablet 2     Sig: TAKE 1 TABLET BY MOUTH DAILY  WITH BREAKFAST       Diabetes Medication Protocol Failed - 2/29/2024  4:25 AM        Failed - Last A1C < 7.5 and within past 6 months     Lab Results   Component Value Date    A1C 5.4 06/21/2023             Passed - In person appointment or virtual visit in the past 6 mos or appointment in next 3 mos     Recent Outpatient Visits              1 week ago Acute cough    [facility] Windy Hernnadez APRN    Office Visit    3 weeks ago Rheumatoid arthritis with negative rheumatoid factor, involving unspecified site ([facility])    [facility] Marija Hernandez MD    Office Visit    1 month ago Rheumatoid arthritis with negative rheumatoid factor, involving unspecified site ([facility])    [facility]    Nurse Only    2 months ago Ear lesion    [facility] Carlito Garcia MD    Office Visit    3 months ago Rheumatoid arthritis with negative rheumatoid factor, involving unspecified site ([facility])    [facility]    Nurse Only          Future Appointments         Provider Department Appt Notes    In 6 days [facility] BRIAN RHEUMATOLOGY [facility] Cimzia injection    In 3 weeks Lane Khan MD [facility]     In 5 months Marija Hernandez MD [facility] Pain level               Passed - Microalbumin procedure in past 12 months or taking ACE/ARB        Passed - EGFRCR or GFRNAA > 50     GFR Evaluation  EGFRCR: 51 , resulted on 2/7/2024          Passed - GFR in the past 12 months           Future Appointments         Provider Department Appt Notes    In 6 days [facility] BRIAN RHEUMATOLOGY [facility] Cimzia injection    In 3 weeks  Lane Khan MD [facility]     In 5 months Marija Hernandez MD [facility] Pain level          Recent Outpatient Visits              1 week ago Acute cough    [facility] Windy Hernandez APRN    Office Visit    3 weeks ago Rheumatoid arthritis with negative rheumatoid factor, involving unspecified site ([facility])    [facility] Marija Hernandez MD    Office Visit    1 month ago Rheumatoid arthritis with negative rheumatoid factor, involving unspecified site ([facility])    [facility]    Nurse Only    2 months ago Ear lesion    [facility] Carlito Garcia MD    Office Visit    3 months ago Rheumatoid arthritis with negative rheumatoid factor, involving unspecified site ([facility])    [facility]    Nurse Only

## 2024-03-07 ENCOUNTER — NURSE ONLY (OUTPATIENT)
Dept: RHEUMATOLOGY | Facility: CLINIC | Age: 79
End: 2024-03-07
Payer: COMMERCIAL

## 2024-03-07 DIAGNOSIS — M06.00 RHEUMATOID ARTHRITIS WITH NEGATIVE RHEUMATOID FACTOR, INVOLVING UNSPECIFIED SITE (HCC): Primary | ICD-10-CM

## 2024-03-07 PROCEDURE — 96372 THER/PROPH/DIAG INJ SC/IM: CPT | Performed by: INTERNAL MEDICINE

## 2024-03-07 NOTE — PROGRESS NOTES
Name and  verified. Pt aware She was to receive injection of Cimza. Injections given Bilateral Lower Abdomen subcutaneous and pt tolerated well. Possible local side effects discussed with pt. Pt aware to follow up in 4 weeks for next injection and to follow up with provider in 5 month after labs completed.      LOV: 24  Future Appointments   Date Time Provider Department Center   3/26/2024 11:20 AM Lane Khan MD [facility]   2024  2:00 PM [facility] RN RHEUMATOLOGY [facility]   2024  2:30 PM Marija Hernandez MD [facility]

## 2024-03-07 NOTE — TELEPHONE ENCOUNTER
Care gap flowsheet updated .    80 y/o F w/ PMH of behcet's syndorome, asthmatic bronchitis, CAD s/p PCI, chronic diastolic CHF, CVA, DVT on Eliquis, HTN, dyslipidemia, lumbago, epilepsy, scoliosis, p/w cough and wheezing. Son at bedside provides history, and states patient has been coughing and has been having SOB. Also states that patient c/o HA from the cough. +Generalized weakness. Patient to be flu positive in ED.     Admit dx:  Influenza with other repiratory manifestations  Visited with pt in room, she was in chair and her granddaughter was present.  Pt speaks Faroese. Pt's granddaughter lives with her,  and translated for her.   EMR weight is 79 kg   175#  On 6/27/2023, pt was 92 kg   203#  Per granddaughter, pt's height is approx 5'9"  She was not diagnosed with malnutrition  NFPE reveals muscle wasting, fat wasting  PO intake estimated < 75% ENN > one month   Pt reveals she has been eating smaller meals in the past 5-6 months due to  being "lazy" and having little physical activity, and experiencing decreased appetite  currently, her PO intake at  is good, consuming at least 75% of ENN  Suggest add ONS if pt's po intake falls below 75% ENN   Maintain DASH diet  Pt has Behcet's disease (autoimmune disorder)  Recommendations to follow in Plan/Intervention

## 2024-03-22 ENCOUNTER — HOSPITAL ENCOUNTER (OUTPATIENT)
Dept: BONE DENSITY | Facility: HOSPITAL | Age: 79
Discharge: HOME | End: 2024-03-22
Attending: INTERNAL MEDICINE
Payer: MEDICARE

## 2024-03-22 DIAGNOSIS — M81.0 AGE-RELATED OSTEOPOROSIS WITHOUT CURRENT PATHOLOGICAL FRACTURE: ICD-10-CM

## 2024-03-22 PROCEDURE — 77080 DXA BONE DENSITY AXIAL: CPT | Performed by: INTERNAL MEDICINE

## 2024-03-26 ENCOUNTER — LAB ENCOUNTER (OUTPATIENT)
Dept: LAB | Age: 79
End: 2024-03-26
Attending: INTERNAL MEDICINE
Payer: MEDICARE

## 2024-03-26 ENCOUNTER — OFFICE VISIT (OUTPATIENT)
Dept: INTERNAL MEDICINE CLINIC | Facility: CLINIC | Age: 79
End: 2024-03-26
Payer: COMMERCIAL

## 2024-03-26 VITALS
DIASTOLIC BLOOD PRESSURE: 65 MMHG | WEIGHT: 111 LBS | HEIGHT: 58 IN | HEART RATE: 67 BPM | BODY MASS INDEX: 23.3 KG/M2 | SYSTOLIC BLOOD PRESSURE: 110 MMHG

## 2024-03-26 DIAGNOSIS — I10 PRIMARY HYPERTENSION: ICD-10-CM

## 2024-03-26 DIAGNOSIS — Z00.00 MEDICARE ANNUAL WELLNESS VISIT, SUBSEQUENT: Primary | ICD-10-CM

## 2024-03-26 DIAGNOSIS — E11.9 DIABETES MELLITUS TYPE 2 WITHOUT RETINOPATHY (HCC): ICD-10-CM

## 2024-03-26 DIAGNOSIS — Z00.00 ENCOUNTER FOR ANNUAL HEALTH EXAMINATION: ICD-10-CM

## 2024-03-26 DIAGNOSIS — Z12.31 VISIT FOR SCREENING MAMMOGRAM: ICD-10-CM

## 2024-03-26 DIAGNOSIS — M48.061 NEURAL FORAMINAL STENOSIS OF LUMBAR SPINE: ICD-10-CM

## 2024-03-26 DIAGNOSIS — M21.372 LEFT FOOT DROP: ICD-10-CM

## 2024-03-26 DIAGNOSIS — M85.80 OSTEOPENIA WITH HIGH RISK OF FRACTURE: ICD-10-CM

## 2024-03-26 DIAGNOSIS — D64.9 CHRONIC ANEMIA: ICD-10-CM

## 2024-03-26 DIAGNOSIS — M06.00 RHEUMATOID ARTHRITIS WITH NEGATIVE RHEUMATOID FACTOR, INVOLVING UNSPECIFIED SITE (HCC): ICD-10-CM

## 2024-03-26 DIAGNOSIS — G57.02 COMMON PERONEAL NEUROPATHY OF LEFT LOWER EXTREMITY: ICD-10-CM

## 2024-03-26 DIAGNOSIS — R82.90 ABNORMAL URINALYSIS: ICD-10-CM

## 2024-03-26 DIAGNOSIS — E78.2 HYPERLIPIDEMIA, MIXED: ICD-10-CM

## 2024-03-26 DIAGNOSIS — Z51.81 THERAPEUTIC DRUG MONITORING: ICD-10-CM

## 2024-03-26 DIAGNOSIS — Z95.1 S/P CABG X 3: ICD-10-CM

## 2024-03-26 LAB
ALBUMIN SERPL-MCNC: 4.3 G/DL (ref 3.2–4.8)
ALBUMIN/GLOB SERPL: 1.5 {RATIO} (ref 1–2)
ALP LIVER SERPL-CCNC: 49 U/L
ALT SERPL-CCNC: 11 U/L
ANION GAP SERPL CALC-SCNC: 7 MMOL/L (ref 0–18)
AST SERPL-CCNC: 20 U/L (ref ?–34)
BILIRUB SERPL-MCNC: 0.2 MG/DL (ref 0.2–1.1)
BUN BLD-MCNC: 26 MG/DL (ref 9–23)
BUN/CREAT SERPL: 24.3 (ref 10–20)
CALCIUM BLD-MCNC: 9.6 MG/DL (ref 8.7–10.4)
CHLORIDE SERPL-SCNC: 112 MMOL/L (ref 98–112)
CHOLEST SERPL-MCNC: 176 MG/DL (ref ?–200)
CLARITY UR: CLEAR
CO2 SERPL-SCNC: 23 MMOL/L (ref 21–32)
COLOR UR: YELLOW
CREAT BLD-MCNC: 1.07 MG/DL
CREAT UR-SCNC: 166.2 MG/DL
CRP SERPL-MCNC: <0.4 MG/DL (ref ?–1)
DEPRECATED RDW RBC AUTO: 47.9 FL (ref 35.1–46.3)
EGFRCR SERPLBLD CKD-EPI 2021: 53 ML/MIN/1.73M2 (ref 60–?)
ERYTHROCYTE [DISTWIDTH] IN BLOOD BY AUTOMATED COUNT: 13.4 % (ref 11–15)
ERYTHROCYTE [SEDIMENTATION RATE] IN BLOOD: 20 MM/HR
EST. AVERAGE GLUCOSE BLD GHB EST-MCNC: 108 MG/DL (ref 68–126)
FASTING PATIENT LIPID ANSWER: NO
FASTING STATUS PATIENT QL REPORTED: NO
GLOBULIN PLAS-MCNC: 2.9 G/DL (ref 2.8–4.4)
GLUCOSE BLD-MCNC: 109 MG/DL (ref 70–99)
GLUCOSE UR-MCNC: NORMAL MG/DL
HBA1C MFR BLD: 5.4 % (ref ?–5.7)
HCT VFR BLD AUTO: 32.4 %
HDLC SERPL-MCNC: 59 MG/DL (ref 40–59)
HGB BLD-MCNC: 10.3 G/DL
HGB UR QL STRIP.AUTO: NEGATIVE
HYALINE CASTS #/AREA URNS AUTO: PRESENT /LPF
KETONES UR-MCNC: NEGATIVE MG/DL
LDLC SERPL CALC-MCNC: 86 MG/DL (ref ?–100)
LEUKOCYTE ESTERASE UR QL STRIP.AUTO: 500
MCH RBC QN AUTO: 31.1 PG (ref 26–34)
MCHC RBC AUTO-ENTMCNC: 31.8 G/DL (ref 31–37)
MCV RBC AUTO: 97.9 FL
MICROALBUMIN UR-MCNC: 5.5 MG/DL
MICROALBUMIN/CREAT 24H UR-RTO: 33.1 UG/MG (ref ?–30)
NITRITE UR QL STRIP.AUTO: NEGATIVE
NONHDLC SERPL-MCNC: 117 MG/DL (ref ?–130)
OSMOLALITY SERPL CALC.SUM OF ELEC: 299 MOSM/KG (ref 275–295)
PH UR: 5 [PH] (ref 5–8)
PLATELET # BLD AUTO: 271 10(3)UL (ref 150–450)
POTASSIUM SERPL-SCNC: 5.1 MMOL/L (ref 3.5–5.1)
PROT SERPL-MCNC: 7.2 G/DL (ref 5.7–8.2)
PROT UR-MCNC: 30 MG/DL
RBC # BLD AUTO: 3.31 X10(6)UL
SODIUM SERPL-SCNC: 142 MMOL/L (ref 136–145)
SP GR UR STRIP: >1.03 (ref 1–1.03)
T4 FREE SERPL-MCNC: 0.8 NG/DL (ref 0.8–1.7)
TRIGL SERPL-MCNC: 184 MG/DL (ref 30–149)
TSI SER-ACNC: 3.74 MIU/ML (ref 0.55–4.78)
UROBILINOGEN UR STRIP-ACNC: NORMAL
VLDLC SERPL CALC-MCNC: 29 MG/DL (ref 0–30)
WBC # BLD AUTO: 8.6 X10(3) UL (ref 4–11)

## 2024-03-26 PROCEDURE — 85652 RBC SED RATE AUTOMATED: CPT

## 2024-03-26 PROCEDURE — 84439 ASSAY OF FREE THYROXINE: CPT

## 2024-03-26 PROCEDURE — 80061 LIPID PANEL: CPT

## 2024-03-26 PROCEDURE — 1170F FXNL STATUS ASSESSED: CPT | Performed by: INTERNAL MEDICINE

## 2024-03-26 PROCEDURE — 3074F SYST BP LT 130 MM HG: CPT | Performed by: INTERNAL MEDICINE

## 2024-03-26 PROCEDURE — 3078F DIAST BP <80 MM HG: CPT | Performed by: INTERNAL MEDICINE

## 2024-03-26 PROCEDURE — 84443 ASSAY THYROID STIM HORMONE: CPT

## 2024-03-26 PROCEDURE — 82570 ASSAY OF URINE CREATININE: CPT

## 2024-03-26 PROCEDURE — 99213 OFFICE O/P EST LOW 20 MIN: CPT | Performed by: INTERNAL MEDICINE

## 2024-03-26 PROCEDURE — 1126F AMNT PAIN NOTED NONE PRSNT: CPT | Performed by: INTERNAL MEDICINE

## 2024-03-26 PROCEDURE — 80053 COMPREHEN METABOLIC PANEL: CPT

## 2024-03-26 PROCEDURE — 85027 COMPLETE CBC AUTOMATED: CPT

## 2024-03-26 PROCEDURE — 82043 UR ALBUMIN QUANTITATIVE: CPT

## 2024-03-26 PROCEDURE — 86140 C-REACTIVE PROTEIN: CPT

## 2024-03-26 PROCEDURE — 36415 COLL VENOUS BLD VENIPUNCTURE: CPT

## 2024-03-26 PROCEDURE — 99499 UNLISTED E&M SERVICE: CPT | Performed by: INTERNAL MEDICINE

## 2024-03-26 PROCEDURE — 83036 HEMOGLOBIN GLYCOSYLATED A1C: CPT

## 2024-03-26 PROCEDURE — 3008F BODY MASS INDEX DOCD: CPT | Performed by: INTERNAL MEDICINE

## 2024-03-26 PROCEDURE — 96160 PT-FOCUSED HLTH RISK ASSMT: CPT | Performed by: INTERNAL MEDICINE

## 2024-03-26 PROCEDURE — 1159F MED LIST DOCD IN RCRD: CPT | Performed by: INTERNAL MEDICINE

## 2024-03-26 PROCEDURE — G0439 PPPS, SUBSEQ VISIT: HCPCS | Performed by: INTERNAL MEDICINE

## 2024-03-26 PROCEDURE — 81001 URINALYSIS AUTO W/SCOPE: CPT

## 2024-03-26 NOTE — PATIENT INSTRUCTIONS
EOPOROSIS OVERVIEW  Osteoporosis is a common problem that causes your bones to become abnormally thin, weakened, and easily broken (fractured). Women are at a higher risk for osteoporosis after menopause due to lower levels of estrogen, a female hormone that helps to maintain bone mass.  Fortunately, preventive treatments are available that can help to maintain or increase your bone density. If you have already been diagnosed with osteoporosis, therapies are available that can slow further loss of bone or increase bone density.  This topic review discusses the therapies available for the prevention and treatment of osteoporosis. A separate topic discusses bone density testing. (See \"Patient education: Bone density testing (Beyond the Basics)\".)  OSTEOPOROSIS PREVENTION  Some of the most important aspects of preventing osteoporosis include eating a healthy diet, getting regular exercise, and avoiding smoking. These recommendations apply to men and women.  Diet -- An optimal diet for bone health involves making sure you get enough protein and calories as well as plenty of calcium and vitamin D, which are essential in helping to maintain proper bone formation and density.  Calcium intake -- Experts recommend that premenopausal women and men consume at least 1000 mg of calcium per day; this includes calcium in foods and beverages plus supplements (eg, pills), which you might need if you don't get enough calcium from your diet. Postmenopausal women should consume 1200 mg of calcium per day (total of diet plus supplements). However, you should not take more than 2000 mg calcium per day, due to the possibility of side effects. (See \"Patient education: Calcium and vitamin D for bone health (Beyond the Basics)\".)  The main dietary sources of calcium include milk and other dairy products, such as cottage cheese, yogurt, and hard cheese, and green vegetables, such as kale and broccoli (table 1). A rough method of estimating  your dietary calcium intake is to multiply the number of dairy servings you consume each day by 300 mg. Examples of a serving include 8 oz of milk (236 mL) or yogurt (224 g), 1 oz (28 g) of hard cheese, or 16 oz (448 g) of cottage cheese.  If you don't get enough calcium through your diet, your health care provider might suggest supplements. Supplements come in the form of calcium carbonate or calcium citrate. Calcium carbonate works best when taken with food, while calcium citrate can be taken on an empty stomach. Your provider can help you decide which supplement to take if you're not sure (table 2). Supplements are often recommended for women since they are at higher risk of developing osteoporosis and they often don't consume enough through foods and beverages. If you need to take more than 500 to 600 mg/day of calcium in supplement form, you should take it in separate doses (eg, once in the morning and again in the evening).  Vitamin D intake -- Experts recommend that men over 70 years and postmenopausal women (ie, women who no longer have monthly periods) consume 800 international units (20 micrograms) of vitamin D each day. This dose appears to reduce bone loss and fracture rate in older women and men who have adequate calcium intake (see 'Calcium intake' above). Although the optimal intake has not been clearly established in premenopausal women or in younger men with osteoporosis, 600 international units (15 micrograms) of vitamin D daily is generally suggested.  Milk supplemented with vitamin D is a primary dietary source of vitamin D; it contains approximately 100 international units (2.5 micrograms) per 8 oz (236 mL). Another good source is salmon, with approximately 800 international units (20 micrograms) per 3 oz (98 g) serving. Other foods, such as orange juice, yogurt, and cereal, are also available with added vitamin D (table 3). Many people do not get enough vitamin D from their diet; your health  care provider might suggest a supplement to help reduce your risk of osteoporosis.  Alcohol -- Drinking a lot of alcohol (more than two drinks a day) can increase your risk of fracture.  Exercise -- Exercise may decrease fracture risk by improving bone mass in premenopausal women and helping to maintain bone density in women who have been through menopause. Furthermore, exercise can strengthen your muscles, improve your balance, and make you less likely to have a fall that could lead to fracture or other injury. Most experts recommend exercising for at least 30 minutes three times per week. Many different types of exercise, including resistance training (eg, using free weights or resistance bands), jogging, jumping, and walking, are effective.  The benefits of exercise are quickly lost if you stop exercising. Finding a regular exercise regimen that you enjoy doing improves your chances of keeping up the habit over the long term. (See \"Patient education: Exercise (Beyond the Basics)\".)  Smoking -- Avoiding or quitting smoking is strongly recommended for bone health because smoking cigarettes is known to speed bone loss. One study suggested that women who smoke one pack per day throughout adulthood have a 5 to 10 percent reduction in bone density by menopause, resulting in an increased risk of fracture. (See \"Patient education: Quitting smoking (Beyond the Basics)\".)  Avoiding falls -- Falling significantly increases the risk of osteoporotic fractures in older adults. Taking measures to prevent falls can decrease the risk of fractures. Such measures may include the following:  ?Removing loose rugs and electrical cords or any other loose items in the home that could lead to tripping, slipping, and falling.  ?Providing adequate lighting in all areas inside and around the home, including stairwells and entrance ways.  ?Avoiding walking on slippery surfaces, such as ice or wet or polished floors.  ?Avoiding walking in  unfamiliar areas outside.  ?Reviewing drug regimens to replace medications that may increase the risk of falls with those that are less likely to do so.  ?Visiting an ophthalmologist or optometrist regularly to check your vision.  Medications that increase risk -- Certain medications can increase bone loss, especially if used at high doses or over a long time. In some cases, you can reduce your risk of osteoporosis by stopping the medication, reducing the dose, or switching to a different medication. Medications that may increase bone loss include the following:  ?Glucocorticoid medications (eg, prednisone)  ?Heparin, an \"anticoagulant\" medication used to prevent and treat abnormal blood clotting  ?Certain antiseizure medications (eg, phenytoin, carbamazepine, primidone, and phenobarbital)  ?Aromatase inhibitors for the treatment of breast cancer (eg, letrozole, anastrozole)  OSTEOPOROSIS SCREENING  Experts suggest screening for osteoporosis for women 65 years and older and for women under 65 who have gone through menopause and have risk factors (such as past fracture, certain medical conditions or medications, or cigarette or alcohol use). Screening involves physical examination, discussion of the person's history, and measurement of bone density through imaging tests. Bone density testing is discussed in more detail separately. (See \"Patient education: Bone density testing (Beyond the Basics)\".)  OSTEOPOROSIS TREATMENT  The measures discussed above can help to prevent osteoporosis or reduce your risk of fracture if you already have osteoporosis. Depending on your situation, your health care provider may also recommend medication or hormonal therapy. Most people at high risk for fracture are treated with drugs that slow the breakdown and removal of bone (anti-resorptive drugs). For people with severe osteoporosis at very high risk for fracture, a drug that stimulates new bone formation (anabolic drug) is sometimes  prescribed.  Who needs treatment with medication? -- People with the highest risk of fracture are the ones most likely to benefit from drug therapy.  ?Postmenopausal women and older men - In the [place], the Bone Health and Osteoporosis Foundation (BHOF, formerly the National Osteoporosis Foundation) recommends use of a medication to treat postmenopausal women (and men ?50 years) with a history of hip or vertebral (spine) fracture or with osteoporosis on bone density testing (T-score ?-2.5). T-scores are numbers that doctors use to measure bone density based on the way your bones look on imaging (table 4).  In addition, the BHOF recommends treatment with medication for low bone density (T-score between -1.0 and -2.5) and an estimated 10-year risk of hip or major osteoporosis-related fracture ?3 or ?20 percent, respectively. You can estimate your risk of fracture using the Fracture Risk Assessment Tool (FRAX) calculator; click on Calculation Tool, and select your region and country to begin.  However, some people who do not meet the above criteria may benefit from a medication to prevent fractures. Your health care provider can talk to you about the risks and benefits and help you make a decision about treatment.  ?Premenopausal women - The relationship between bone density and fracture risk in premenopausal women (ie, those who have not yet gone through menopause) is not well defined. A premenopausal woman with low bone density may have little increased risk of fracture. Thus, bone density alone should not be used to diagnose osteoporosis in a premenopausal woman; further evaluation for other potential causes of bone loss is generally recommended.  Anti-resorptive drugs  Bisphosphonates -- Bisphosphonates are medications that slow the breakdown and removal of bone (ie, resorption). They are widely used for the prevention and treatment of osteoporosis in postmenopausal women. Some of the commonly prescribed  bisphosphonates include:  ?Alendronate - Alendronate (brand names: Binosto, Fosamax) reduces the risk of vertebral and hip fractures, and it decreases the loss of height associated with vertebral fractures. It is available as a pill that is taken once per day or once per week.  ?Risedronate - Risedronate (brand names: Actonel, Atelvia) reduces the risk of both vertebral and hip fractures. Risedronate is approved for both prevention and treatment of osteoporosis. It can be taken once per day, once per week, or once per month.  ?Ibandronate - Although ibandronate (brand name: Boniva) reduces the risk of bone loss and vertebral fractures, there is no proof that it reduces the risk of hip fractures, so it is not recommended as often as alendronate and risedronate. Ibandronate (brand name: Boniva) can be used for prevention and treatment of osteoporosis. It is available as a pill that is taken once per day or once per month. It is also available as an injection that is given into a vein once every three months.  ?Zoledronic acid - A once-yearly, intravenous dose of zoledronic acid (sample brand name: Reclast) is also available for the treatment of osteoporosis. This medication is given into a vein (by \"IV\") over 15 minutes and is usually well tolerated. Zoledronic acid can improve bone density and decrease the risk of vertebral and hip fractures.  Intravenous zoledronic acid is an appealing alternative for people who cannot tolerate oral bisphosphonates or who prefer a once yearly to a monthly, weekly, or daily regimen. Zoledronic acid is usually given for three years and then discontinued. Your doctor will monitor your bone density to see if it needs to be restarted.  Instructions for oral bisphosphonates -- Oral bisphosphonates need to be taken first thing in the morning on an empty stomach with a full 8 oz glass of plain (not sparkling) water. You then need to wait for a half hour or an hour, depending on which one  you take, before eating or taking any other medications:  ?Alendronate or risedronate - If you take either of these drugs, wait at least half an hour.  ?Ibandronate - If you take this drug, wait at least one hour.  These instructions help ensure that the drugs will be absorbed and also reduce the risk of side effects and potential complications.  A \"delayed-release\" formulation of risedronate is also available. Unlike immediate-release risedronate and other oral bisphosphonates, delayed-release risedronate is taken immediately after breakfast and with at least 4 ounces of water.  After taking any oral bisphosphonate, remain upright (sitting or standing) for at least 30 minutes to minimize the risk of acid reflux and other gastrointestinal side effects. (See 'Side effects of bisphosphonates' below.)  If you are at high risk for breaking a bone, you can safely take osteoporosis medicines for many years. However, most people can stop taking alendronate, risedronate, or ibandronate after five years. This is because these drugs have residual benefit, even after you stop them. This approach also minimizes side effects from long-term use. Your doctor will continue to monitor your bone density to determine if you need to start medication again.  Side effects of bisphosphonates -- Most people who take bisphosphonates do not have any serious side effects related to the medication. However, it is important to closely follow the instructions if taking the medication by mouth; lying down or eating sooner than the recommended time after a dose increases the risk of stomach upset.  Side effects of intravenous zoledronic acid can include flu-like symptoms within 24 to 72 hours of the first dose. This may include a low-grade fever and muscle and joint pain. Treatment with a fever-reducing medication (acetaminophen) generally improves the symptoms. Subsequent doses typically cause milder symptoms.  There has been concern about use of  bisphosphonates in people who require invasive dental work. A problem known as osteonecrosis of the jaw has developed in people who used bisphosphonates. The risk of this problem is very small in people who take bisphosphonates for osteoporosis prevention and treatment. However, there is a slightly higher risk of this problem when higher doses of bisphosphonates are given into a vein during cancer treatment.  For people who take bisphosphonates to treat osteoporosis, experts do not think that it is necessary to stop the medication before invasive dental work (eg, tooth extraction or implant). However, people who take a bisphosphonate as part of a treatment for cancer should consult their doctor before having invasive dental work. Good oral hygiene and routine dental visits are encouraged for everyone.  Taking bisphosphonates for a long time (eg, seven years or longer) can rarely increase the risk of an unusual type of femur (thigh bone) fracture. Taking bisphosphonates for up to five years for osteoporosis (the usual duration of treatment) is usually not associated with these \"atypical\" fractures, and the benefits outweigh the risk of this rare side effect.  \"Estrogen-like\" medications -- Certain medications, known as selective estrogen receptor modulators (SERMs), produce some estrogen-like effects in the bone. These medications, which include raloxifene (brand name: Evista) and tamoxifen, provide protection against postmenopausal bone loss. In addition, SERMs decrease the risk of breast cancer in women who are at high risk. Raloxifene can be used for the prevention and treatment of osteoporosis in postmenopausal women, although it may be less effective in preventing bone loss than bisphosphonates or estrogen (see 'Hormone therapy' below). Tamoxifen is usually given to women with breast cancer to reduce the risk of recurrence, or to women who have never had breast cancer but are at high risk of developing it. (See  \"Patient education: Medications for the prevention of breast cancer (Beyond the Basics)\".)  SERMs are not recommended for premenopausal women.  Hormone therapy -- Hormone therapy with estrogen is not recommended solely for the prevention or treatment of osteoporosis in postmenopausal women. However, women who choose to take estrogen to relieve symptoms of menopause will also have the benefit of a reduction in risk of bone loss and fracture and do not need additional treatment to prevent bone loss. For some postmenopausal women who cannot tolerate any other type of osteoporosis treatment, estrogen therapy may be an option. The risks and benefits of estrogen therapy are discussed in detail separately. (See \"Patient education: Menopausal hormone therapy (Beyond the Basics)\".)  Estrogen is an appropriate treatment for prevention of osteoporosis in young women whose ovaries do not make estrogen. This treatment may be given as a skin patch or orally, such as in the form of birth control pills. (See \"Patient education: Absent or irregular periods (Beyond the Basics)\".)  Denosumab -- Denosumab (brand name: Prolia) is a medicine that blocks a specific protein involved in the formation of cells that break down bone. Denosumab improves bone mineral density and reduces fracture in postmenopausal women with osteoporosis. It is given as an injection under the skin once every six months. Although denosumab is generally well tolerated, side effects can include skin infections (cellulitis) and eczema. A mild, transient lowering of blood calcium levels has also been reported, but this is not usually a problem in patients with good kidney function, who are taking enough calcium and vitamin D.  Denosumab is usually reserved for people who are intolerant of or unresponsive to oral and/or intravenous bisphosphonates.  Stopping denosumab results in bone loss within a relatively short time. An increased risk for vertebral fracture has been  reported after stopping denosumab. If you need to stop taking denosumab, your doctor will prescribe an alternative medication to prevent rapid bone loss.  Calcitonin -- Calcitonin is a hormone produced by the thyroid gland that, together with parathyroid hormone (PTH), helps to regulate calcium concentrations in the body. Calcitonin is no longer used to treat osteoporosis, because other available options (eg, bisphosphonates) are more effective for the prevention of bone loss and reduction of fracture risk. In addition, there is concern about the long-term use of calcitonin for osteoporosis and an increase in cancer rates. However, due to its pain-relieving effects, calcitonin may be suggested as short-term therapy for people who have acute pain due to vertebral fractures. The treatment regimen is typically changed once the acute pain subsides or if the pain fails to improve over a prolonged period (eg, four weeks).  Calcitonin may be administered as a nasal spray or injection. Most people prefer the nasal spray due to ease of use and because the injections tend to cause more nausea and flushing. (See \"Calcitonin in the prevention and treatment of osteoporosis\".)  Anabolic agents -- Anabolic agents are usually only recommended for people with severe osteoporosis. Anabolic agents are unique osteoporosis drugs in that they work by stimulating bone formation. The other medications described above (anti-resorptives) work by reducing bone resorption.  Anabolic agents used in the treatment of osteoporosis are discussed below.  Parathyroid hormone/parathyroid hormone-related protein -- Clinical trials suggest that PTH and parathyroid hormone-related protein (PTHrP) are effective in the treatment of osteoporosis in postmenopausal women and in men, and that these drugs are more effective than anti-resorptives. These drugs reduce the risk of vertebral and nonvertebral fractures.  PTH (teriparatide; brand name: Forteo) or a  PTHrP analog (abaloparatide; brand name; Tymlos) are given by a daily subcutaneous (under the skin) injection. These are available in multi-dose, prefilled pens so people can give themselves injections at home. They are only used for up to two years, then replaced with an anti-resorptive drug.  Romosozumab -- Romosozumab (brand name: Evenity) is a medicine that blocks a protein in the body. This protein usually stops new bone from being formed. Blocking the protein allows the body to make new bone. Romosozumab has been shown to decrease vertebral and nonvertebral fractures. It is given by a monthly subcutaneous injection (administered by a health care professional) and is only used for up to one year, then replaced with an anti-resorptive drug.  MONITORING RESPONSE TO TREATMENT  If you take medication to prevent or treat osteoporosis, your doctor will monitor you to see how well it is working. This typically includes measurement of bone mineral density with dual-energy x-ray absorptiometry (DXA) (see \"Patient education: Bone density testing (Beyond the Basics)\"). Some people also get blood or urine tests; these can give information about the rate of bone turnover (ie, how quickly old bone is resorbing and new bone is forming).  Nikki Mensah's SCREENING SCHEDULE   Tests on this list are recommended by your physician but may not be covered, or covered at this frequency, by your insurer.   Please check with your insurance carrier before scheduling to verify coverage.   PREVENTATIVE SERVICES FREQUENCY &  COVERAGE DETAILS LAST COMPLETION DATE   Diabetes Screening    Fasting Blood Sugar /  Glucose    One screening every 12 months if never tested or if previously tested but not diagnosed with pre-diabetes   One screening every 6 months if diagnosed with pre-diabetes Lab Results   Component Value Date     (H) 03/26/2024        Cardiovascular Disease Screening    Lipid Panel  Cholesterol  Lipoprotein  (HDL)  Triglycerides Covered every 5 years for all Medicare beneficiaries without apparent signs or symptoms of cardiovascular disease Lab Results   Component Value Date    CHOLEST 176 03/26/2024    HDL 59 03/26/2024    LDL 86 03/26/2024    TRIG 184 (H) 03/26/2024         Electrocardiogram (EKG)   Covered if needed at Welcome to Medicare, and non-screening if indicated for medical reasons 03/29/2019      Ultrasound Screening for Abdominal Aortic Aneurysm (AAA) Covered once in a lifetime for one of the following risk factors   • Men who are 65-75 years old and have ever smoked   • Anyone with a family history -     Colorectal Cancer Screening  Covered for ages 50-85; only need ONE of the following:    Colonoscopy   Covered every 10 years    Covered every 2 years if patient is at high risk or previous colonoscopy was abnormal 08/29/2017    Health Maintenance   Topic Date Due   • Colorectal Cancer Screening  Discontinued       Flexible Sigmoidoscopy   Covered every 4 years -    Fecal Occult Blood Test Covered annually -   Bone Density Screening    Bone density screening    Covered every 2 years after age 65 if diagnosed with risk of osteoporosis or estrogen deficiency.    Covered yearly for long-term glucocorticoid medication use (Steroids) Last Dexa Scan:    XR DEXA BONE DENSITOMETRY (CPT=77080) 03/22/2024      No recommendations at this time   Pap and Pelvic    Pap   Covered every 2 years for women at normal risk; Annually if at high risk -  No recommendations at this time    Chlamydia Annually if high risk -  No recommendations at this time   Screening Mammogram    Mammogram     Recommend annually for all female patients aged 40 and older    One baseline mammogram covered for patients aged 35-39 08/25/2023    Health Maintenance   Topic Date Due   • Mammogram  Discontinued       Immunizations    Influenza Covered once per flu season  Please get every year 09/24/2023  No recommendations at this time    Pneumococcal  Each vaccine ([id number] & [id number]) covered once after 65 Prevnar 13: 06/02/2017    [id number]: 03/19/2018     No recommendations at this time    Hepatitis B One screening covered for patients with certain risk factors   -  No recommendations at this time    Tetanus Toxoid Not covered by Medicare Part B unless medically necessary (cut with metal); may be covered with your pharmacy prescription benefits -    Tetanus, Diptheria and Pertusis TD and TDaP Not covered by Medicare Part B -  No recommendations at this time    Zoster Not covered by Medicare Part B; may be covered with your pharmacy  prescription benefits 04/14/2011  No recommendations at this time     Diabetes      Hemoglobin A1C Annually; if last result is elevated, may be asked to retest more frequently.    Medicare covers every 3 months Lab Results   Component Value Date     03/26/2024    A1C 5.4 03/26/2024       No recommendations at this time    Creat/alb ratio Annually Lab Results   Component Value Date    MICROALBCREA 33.1 (H) 03/26/2024       LDL Annually Lab Results   Component Value Date    LDL 86 03/26/2024       Dilated Eye Exam Annually Last Diabetic Eye Exam:  Last Dilated Eye Exam: 01/15/24  Eye Exam shows Diabetic Retinopathy?: No       Annual Monitoring of Persistent Medications (ACE/ARB, digoxin diuretics, anticonvulsants)    Potassium Annually Lab Results   Component Value Date    K 5.1 03/26/2024         Creatinine   Annually Lab Results   Component Value Date    CREATSERUM 1.07 (H) 03/26/2024         BUN Annually Lab Results   Component Value Date    BUN 26 (H) 03/26/2024       Drug Serum Conc Annually No results found for: \"DIGOXIN\", \"DIG\", \"VALP\"

## 2024-03-28 ENCOUNTER — LAB ENCOUNTER (OUTPATIENT)
Dept: LAB | Age: 79
End: 2024-03-28
Attending: INTERNAL MEDICINE
Payer: MEDICARE

## 2024-03-28 DIAGNOSIS — R82.90 ABNORMAL URINALYSIS: ICD-10-CM

## 2024-03-28 LAB
BILIRUB UR QL: NEGATIVE
CLARITY UR: CLEAR
COLOR UR: COLORLESS
GLUCOSE UR-MCNC: NORMAL MG/DL
HGB UR QL STRIP.AUTO: NEGATIVE
KETONES UR-MCNC: NEGATIVE MG/DL
LEUKOCYTE ESTERASE UR QL STRIP.AUTO: 25
NITRITE UR QL STRIP.AUTO: NEGATIVE
PH UR: 5 [PH] (ref 5–8)
PROT UR-MCNC: NEGATIVE MG/DL
SP GR UR STRIP: 1 (ref 1–1.03)
UROBILINOGEN UR STRIP-ACNC: NORMAL

## 2024-03-28 PROCEDURE — 81001 URINALYSIS AUTO W/SCOPE: CPT

## 2024-03-28 PROCEDURE — 87086 URINE CULTURE/COLONY COUNT: CPT

## 2024-04-04 ENCOUNTER — NURSE ONLY (OUTPATIENT)
Dept: RHEUMATOLOGY | Facility: CLINIC | Age: 79
End: 2024-04-04

## 2024-04-04 DIAGNOSIS — M06.00 RHEUMATOID ARTHRITIS WITH NEGATIVE RHEUMATOID FACTOR, INVOLVING UNSPECIFIED SITE (HCC): Primary | ICD-10-CM

## 2024-04-04 PROCEDURE — 96372 THER/PROPH/DIAG INJ SC/IM: CPT | Performed by: INTERNAL MEDICINE

## 2024-04-04 NOTE — PROGRESS NOTES
Name and  verified. Pt aware she was to receive injection of Cimza. Pt denies current illness or antibiotic use. Injections given in bilateral lower abdomen and pt tolerated well. Possible local side effects discussed with pt. Pt aware to follow up in 4 weeks for next injection.

## 2024-04-05 PROBLEM — M47.816 ARTHRITIS, LUMBAR SPINE: Status: RESOLVED | Noted: 2020-09-09 | Resolved: 2024-04-05

## 2024-04-05 NOTE — PROGRESS NOTES
Subjective:   Nikki Mensah is a 78 year old female who presents for a Medicare Subsequent Annual Wellness visit (Pt already had Initial Annual Wellness) and scheduled follow up of multiple significant but stable problems.       History/Other:   Fall Risk Assessment:   She has been screened for Falls and is High Risk. Fall Prevention information provided to patient in After Visit Summary.    Do you feel unsteady when standing or walking?: No  Do you worry about falling?: Yes  Have you fallen in the past year?: Yes  How many times have you fallen?: (P) 2  Were you injured?: (P) No     Cognitive Assessment:   She had a completely normal cognitive assessment - see flowsheet entries     Functional Ability/Status:   Nikki Mensah has some abnormal functions as listed below:  She has Vision problems based on screening of functional status.       Depression Screening (PHQ-2/PHQ-9): PHQ-2 SCORE: 0  , done 3/25/2024             Advanced Directives:   She has a Living Will on file in [employer]; reviewed and discussed documents with patient (and family/surrogate if present).  She has a Power of  for Health Care on file in [employer].  Discussed Advance Care Planning with patient (and family/surrogate if present). Standard forms made available to patient in After Visit Summary.      Patient Active Problem List   Diagnosis    RA (rheumatoid arthritis) (HCC)    Diabetes mellitus type 2 without retinopathy (HCC)    S/P CABG x 3    Hyperlipidemia, mixed    Chronic anemia    Left foot drop    Neural foraminal stenosis of lumbar spine    Common peroneal neuropathy of left lower extremity     Allergies:  She has No Known Allergies.    Current Medications:  Outpatient Medications Marked as Taking for the 3/26/24 encounter (Office Visit) with Lane Khan MD   Medication Sig    metFORMIN 500 MG Oral Tab Take 1 tablet (500 mg total) by mouth daily with breakfast.    lisinopril 10 MG Oral Tab Take 1 tablet (10 mg total) by mouth daily.     sulfaSALAzine 500 MG Oral Tab Take 2 tablets (1,000 mg total) by mouth 2 (two) times daily.    metoprolol tartrate 50 MG Oral Tab Take 1 tablet (50 mg total) by mouth 2 (two) times daily.    Glucose Blood (TRUE METRIX BLOOD GLUCOSE TEST) In Vitro Strip 1 each by In Vitro route daily. Use to check blood sugar once daily    gabapentin 600 MG Oral Tab Take 1 tablet (600 mg total) by mouth 2 (two) times daily.    hydroCHLOROthiazide 12.5 MG Oral Tab Take 1 tablet (12.5 mg total) by mouth daily.    simvastatin 40 MG Oral Tab Take 1 tablet (40 mg total) by mouth nightly.    leflunomide 20 MG Oral Tab Take 1 tablet (20 mg total) by mouth daily.    aspirin 81 MG Oral Tab EC Take 1 tablet (81 mg total) by mouth once.    LYSINE OR Take 1 tablet by mouth daily.    Certolizumab Pegol (CIMZIA PREFILLED) 2 X 200 MG/ML Subcutaneous Kit Inject 400 mg into the skin every 28 days.    Acetaminophen  MG Oral Tab CR Take 2 tablets (1,300 mg total) by mouth in the morning and 2 tablets (1,300 mg total) before bedtime.    Calcium Carb-Cholecalciferol (CALCIUM 500 +D OR) Take 1,200 mg by mouth. Take 1 tab. 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  Surgical History:  She  has a past surgical history that includes appendectomy (); angioplasty (coronary) ();  (, , ); cabg (); colonoscopy (2017); colonoscopy (N/A, 2017); laminectomy,lumbar (Left, 2019); back surgery (2019); cataract (Left, 2021); and decompression; unspecified (2022).   Family History:  Her family history includes 1 brother, 2 sisters. in an other family member; 2 sons, 1 daughter in an other family member; Asthma in her son; Cancer (age of onset: 80) in her maternal aunt; Heart Disease (age of onset: 72) in her father; Macular degeneration in her mother; Pancreatic Cancer in her maternal aunt; Stroke (age of onset: 52) in her sister;  age 97 old age in her mother; sciatica in her brother.  Social History:  She  reports that she quit smoking about 49 years ago. Her smoking use included cigarettes. She has a 16 pack-year smoking history. She has been exposed to tobacco smoke. She has never used smokeless tobacco. She reports current alcohol use. She reports that she does not use drugs.    Tobacco:  She smoked tobacco in the past but quit greater than 12 months ago.  Social History    Tobacco Use      Smoking status: Former        Packs/day: 1.00        Years: 16.00        Additional pack years: 0.00        Total pack years: 16.00        Types: Cigarettes        Quit date: 1975        Years since quittin.2        Passive exposure: Past      Smokeless tobacco: Never       CAGE Alcohol Screen:   CAGE screening score of 0 on 3/25/2024, showing low risk of alcohol abuse.      Patient Care Team:  Lane Khan MD as PCP - General (Internal Medicine)  Gurinder Juarez DO (NEUROLOGY)  Jose Jernigan MD as Consulting Physician (NEUROSURGERY)  Atul Thakur (NEUROSURGERY)    Review of Systems   Constitutional:  Negative for activity change, chills, fatigue and fever.   HENT:  Negative for ear  discharge, nosebleeds, postnasal drip, rhinorrhea, sinus pressure and sore throat.    Eyes:  Negative for pain, discharge and redness.   Respiratory:  Negative for cough, chest tightness, shortness of breath and wheezing.    Cardiovascular:  Negative for chest pain, palpitations and leg swelling.   Gastrointestinal:  Negative for abdominal pain, blood in stool, constipation, diarrhea, nausea and vomiting.   Genitourinary:  Negative for difficulty urinating, dysuria, frequency, hematuria and urgency.   Musculoskeletal:  Positive for arthralgias and gait problem. Negative for back pain and joint swelling.   Skin:  Negative for rash.   Neurological:  Positive for weakness (left foot drop  gait abnormal). Negative for syncope, light-headedness and headaches.   Psychiatric/Behavioral:  Negative for dysphoric mood. The patient is not nervous/anxious.          Objective:   Physical Exam  Constitutional:       Appearance: She is well-developed. She is not ill-appearing.   HENT:      Right Ear: Ear canal normal.      Left Ear: Ear canal normal.      Mouth/Throat:      Pharynx: Oropharynx is clear.   Eyes:      Extraocular Movements: Extraocular movements intact.      Conjunctiva/sclera: Conjunctivae normal.      Pupils: Pupils are equal, round, and reactive to light.   Cardiovascular:      Rate and Rhythm: Normal rate and regular rhythm.      Heart sounds: Normal heart sounds.   Pulmonary:      Effort: Pulmonary effort is normal.      Breath sounds: Normal breath sounds.   Abdominal:      General: Bowel sounds are normal.      Palpations: Abdomen is soft.   Musculoskeletal:      Comments: Left foot drop   Skin:     General: Skin is warm and dry.   Neurological:      Mental Status: She is alert.      Gait: Gait abnormal.   Psychiatric:         Mood and Affect: Mood normal.           /65 (BP Location: Right arm, Patient Position: Sitting, Cuff Size: adult)   Pulse 67   Ht 4' 10\" (1.473 m)   Wt 111 lb (50.3 kg)   BMI  23.20 kg/m²  Estimated body mass index is 23.2 kg/m² as calculated from the following:    Height as of this encounter: 4' 10\" (1.473 m).    Weight as of this encounter: 111 lb (50.3 kg).    Medicare Hearing Assessment:   Hearing Screening    Entry User: Sarah De La Rosa CMA  Screening Method: Questionnaire  I have a problem hearing over the telephone: No I have trouble following the conversations when two or more people are talking at the same time: Sometimes   I have trouble understanding things on the TV: No I have to strain to understand conversations: No   I have to worry about missing the telephone ring or doorbell: No I have trouble hearing conversations in a noisy background such as a crowded room or restaurant: Sometimes   I get confused about where sounds come from: No I misunderstand some words in a sentence and need to ask people to repeat themselves: No   I especially have trouble understanding the speech of women and children: No I have trouble understanding the speaker in a large room such as at a meeting or place of Rastafarian: No   Many people I talk to seem to mumble (or don't speak clearly): No People get annoyed because I misunderstand what they say: No   I misunderstand what others are saying and make inappropriate responses: No I avoid social activities because I cannot hear well and fear I will reply improperly: No   Family members and friends have told me they think I may have hearing loss: No                 Assessment & Plan:   Nikki Mensah is a 78 year old female who presents for a Medicare Assessment.   (Z00.00) Medicare annual wellness visit, subsequent  (primary encounter diagnosis)  Plan:Patient is independent with ADL.s    Health screening   Colonoscopy, mammography, dexa scan  And routine eye exam advised.      (M06.00) Rheumatoid arthritis with negative rheumatoid factor, involving unspecified site (HCC)  Plan: chronic    (Z95.1) S/P CABG x 3  Plan:Asymptomatic  monitor  Followed by  cardiology    (I10) Primary hypertension  Plan: Urinalysis, Routine        /65 (BP Location: Right arm, Patient Position: Sitting, Cuff Size: adult)   Pulse 67   Ht 4' 10\" (1.473 m)   Wt 111 lb (50.3 kg)   BMI 23.20 kg/m²   Controlled monitor    (M21.372) Left foot drop  Plan: chronic    Fall preucaiton monitor    (M48.061) Neural foraminal stenosis of lumbar spine  Plan: chronic pant control monitor    (D64.9) Chronic anemia  Plan: stable  monitor    (E11.9) Diabetes mellitus type 2 without retinopathy (HCC)  Plan: Hemoglobin A1C, Microalb/Creat Ratio, Random         Urine        HGBA1C:    Lab Results   Component Value Date    A1C 5.4 03/26/2024    A1C 5.4 06/21/2023    A1C 5.9 (H) 02/07/2023     03/26/2024     Controlle dmonitor    (E78.2) Hyperlipidemia, mixed  Plan: Comp Metabolic Panel (14), Lipid Panel, TSH and        Free T4        Low cholesterol diet advised  Avoid saturated and trans fats       (Z12.31) Visit for screening mammogram  Plan: [facility] JODEE 2D+3D SCREENING BILAT         (CPT=77067/86666)        Self breast exam advised    (M85.80) Osteopenia with high risk of fracture  Plan: ENDOCRINOLOGY - INTERNAL        Chronic  follow up with endo for treatment    (R82.90) Abnormal urinalysis  Plan: Urinalysis with Culture Reflex        Monitor  Repeat urinalysis    (G57.02) Common peroneal neuropathy of left lower extremity  Plan: left foot drop chronic monitor    Medications and most recent test results reviewed  Refill medicaitons  as needed  Potential side effect discussed  Modification of risk for CAD advised    Dietary an lifestyle change  Pt voiced understanding and agrees with plan  Pt given time to ask questions  After Visit Summary handout    Discussed  And given to patient.          The patient indicates understanding of these issues and agrees to the plan.  Reinforced healthy diet, lifestyle, and exercise.  Orders Placed This Encounter   Procedures    Comp Metabolic Panel (14)     Lipid Panel    Hemoglobin A1C    TSH and Free T4    Urinalysis, Routine    Microalb/Creat Ratio, Random Urine    Urinalysis with Culture Reflex         No follow-ups on file.     Lane Khan MD, 4/5/2024     Supplementary Documentation:   General Health:  In the past six months, have you lost more than 10 pounds without trying?: 2 - No  Has your appetite been poor?: No  Type of Diet: Balanced  How does the patient maintain a good energy level?: Appropriate Exercise;Daily Walks  How would you describe your daily physical activity?: Moderate  How would you describe your current health state?: Good  How do you maintain positive mental well-being?: Social Interaction;Visiting Friends;Visiting Family  On a scale of 0 to 10, with 0 being no pain and 10 being severe pain, what is your pain level?: 2 - (Mild)  In the past six months, have you experienced urine leakage?: 1-Yes  At any time do you feel concerned for the safety/well-being of yourself and/or your children, in your home or elsewhere?: No  Have you had any immunizations at another office such as Influenza, Hepatitis B, Tetanus, or Pneumococcal?: No       Nikki Mensah's SCREENING SCHEDULE   Tests on this list are recommended by your physician but may not be covered, or covered at this frequency, by your insurer.   Please check with your insurance carrier before scheduling to verify coverage.   PREVENTATIVE SERVICES FREQUENCY &  COVERAGE DETAILS LAST COMPLETION DATE   Diabetes Screening    Fasting Blood Sugar /  Glucose    One screening every 12 months if never tested or if previously tested but not diagnosed with pre-diabetes   One screening every 6 months if diagnosed with pre-diabetes Lab Results   Component Value Date     (H) 03/26/2024        Cardiovascular Disease Screening    Lipid Panel  Cholesterol  Lipoprotein (HDL)  Triglycerides Covered every 5 years for all Medicare beneficiaries without apparent signs or symptoms of cardiovascular disease  Lab Results   Component Value Date    CHOLEST 176 03/26/2024    HDL 59 03/26/2024    LDL 86 03/26/2024    TRIG 184 (H) 03/26/2024         Electrocardiogram (EKG)   Covered if needed at Welcome to Medicare, and non-screening if indicated for medical reasons 03/29/2019      Ultrasound Screening for Abdominal Aortic Aneurysm (AAA) Covered once in a lifetime for one of the following risk factors    Men who are 65-75 years old and have ever smoked    Anyone with a family history -     Colorectal Cancer Screening  Covered for ages 50-85; only need ONE of the following:    Colonoscopy   Covered every 10 years    Covered every 2 years if patient is at high risk or previous colonoscopy was abnormal 08/29/2017    Health Maintenance   Topic Date Due    Colorectal Cancer Screening  Discontinued       Flexible Sigmoidoscopy   Covered every 4 years -    Fecal Occult Blood Test Covered annually -   Bone Density Screening    Bone density screening    Covered every 2 years after age 65 if diagnosed with risk of osteoporosis or estrogen deficiency.    Covered yearly for long-term glucocorticoid medication use (Steroids) Last Dexa Scan:    XR DEXA BONE DENSITOMETRY (CPT=77080) 03/22/2024      No recommendations at this time   Pap and Pelvic    Pap   Covered every 2 years for women at normal risk; Annually if at high risk -  No recommendations at this time    Chlamydia Annually if high risk -  No recommendations at this time   Screening Mammogram    Mammogram     Recommend annually for all female patients aged 40 and older    One baseline mammogram covered for patients aged 35-39 08/25/2023    Health Maintenance   Topic Date Due    Mammogram  Discontinued       Immunizations    Influenza Covered once per flu season  Please get every year 09/24/2023  No recommendations at this time    Pneumococcal Each vaccine ([id number] & [id number]) covered once after 65 Prevnar 13: 06/02/2017    [id number]: 03/19/2018     No recommendations at this  time    Hepatitis B One screening covered for patients with certain risk factors   -  No recommendations at this time    Tetanus Toxoid Not covered by Medicare Part B unless medically necessary (cut with metal); may be covered with your pharmacy prescription benefits -    Tetanus, Diptheria and Pertusis TD and TDaP Not covered by Medicare Part B -  No recommendations at this time    Zoster Not covered by Medicare Part B; may be covered with your pharmacy  prescription benefits 04/14/2011  No recommendations at this time     Diabetes      Hemoglobin A1C Annually; if last result is elevated, may be asked to retest more frequently.    Medicare covers every 3 months Lab Results   Component Value Date     03/26/2024    A1C 5.4 03/26/2024       No recommendations at this time    Creat/alb ratio Annually Lab Results   Component Value Date    MICROALBCREA 33.1 (H) 03/26/2024       LDL Annually Lab Results   Component Value Date    LDL 86 03/26/2024       Dilated Eye Exam Annually Last Diabetic Eye Exam:  Last Dilated Eye Exam: 01/15/24  Eye Exam shows Diabetic Retinopathy?: No       Annual Monitoring of Persistent Medications (ACE/ARB, digoxin diuretics, anticonvulsants)    Potassium Annually Lab Results   Component Value Date    K 5.1 03/26/2024         Creatinine   Annually Lab Results   Component Value Date    CREATSERUM 1.07 (H) 03/26/2024         BUN Annually Lab Results   Component Value Date    BUN 26 (H) 03/26/2024       Drug Serum Conc Annually No results found for: \"DIGOXIN\", \"DIG\", \"VALP\"

## 2024-05-06 ENCOUNTER — NURSE ONLY (OUTPATIENT)
Dept: RHEUMATOLOGY | Facility: CLINIC | Age: 79
End: 2024-05-06
Payer: COMMERCIAL

## 2024-05-06 DIAGNOSIS — M06.00 RHEUMATOID ARTHRITIS WITH NEGATIVE RHEUMATOID FACTOR, INVOLVING UNSPECIFIED SITE (HCC): Primary | ICD-10-CM

## 2024-05-06 NOTE — PROGRESS NOTES
Name and  verified. Pt aware she was to receive injection of Cimza. Injections given and pt tolerated well. Pt stated she is doing well. Pt aware to follow up in 4 weeks for next injection.      Summary:  1. Cont.  Sulfasalazine 1000mg twice a day -     2. Cont.  lelfunomide  20mg a day -    3. Cont.  cimzi 400mg a month. -   4. Return to clinic in 6 months.   5. Labs today and then in 6 months.      Marija Hernandez MD  2024   4:42 PM

## 2024-05-10 NOTE — TELEPHONE ENCOUNTER
Refill passed per [facility] protocol.     Requested Prescriptions   Pending Prescriptions Disp Refills    METFORMIN 500 MG Oral Tab [Pharmacy Med Name: metFORMIN HCl 500 MG Oral Tablet] 100 tablet 2     Sig: TAKE 1 TABLET BY MOUTH DAILY  WITH BREAKFAST       Diabetes Medication Protocol Passed - 5/9/2024  9:40 PM        Passed - Last A1C < 7.5 and within past 6 months     Lab Results   Component Value Date    A1C 5.4 03/26/2024             Passed - In person appointment or virtual visit in the past 6 mos or appointment in next 3 mos     Recent Outpatient Visits              4 days ago Rheumatoid arthritis with negative rheumatoid factor, involving unspecified site ([facility])    [facility]    Nurse Only    1 month ago Rheumatoid arthritis with negative rheumatoid factor, involving unspecified site ([facility])    [facility]    Nurse Only    1 month ago Medicare annual wellness visit, subsequent    [facility] Lane Khan MD    Office Visit    2 months ago Rheumatoid arthritis with negative rheumatoid factor, involving unspecified site ([facility])    [facility]    Nurse Only    2 months ago Acute cough    [facility] Windy Hernandez APRN    Office Visit          Future Appointments         Provider Department Appt Notes    In 3 weeks [facility] RN RHEUMATOLOGY [facility] Cimzia    In 2 months Marija Hernandez MD [facility] Pain level    In 3 months [address] [facility]                     Passed - Microalbumin procedure in past 12 months or taking ACE/ARB        Passed - EGFRCR or GFRNAA > 50     GFR Evaluation  EGFRCR: 53 , resulted on 3/26/2024          Passed - GFR in the past 12 months               Recent Outpatient Visits              4 days ago Rheumatoid arthritis with negative rheumatoid factor, involving unspecified site ([facility])    [facility]    Nurse Only    1 month ago Rheumatoid arthritis with negative rheumatoid factor, involving unspecified site ([facility])    [facility]    Nurse Only    1 month ago Medicare annual wellness visit, subsequent    [facility] Lane Khan MD    Office Visit    2 months ago Rheumatoid arthritis with negative rheumatoid factor, involving unspecified site ([facility])    [facility]    Nurse Only    2 months ago Acute cough    [facility] Windy Hernandez APRN    Office Visit             Future Appointments         Provider Department Appt Notes    In 3 weeks [facility] RN RHEUMATOLOGY [facility] Cimzia    In 2 months Marija Hernandez MD [facility] Pain level    In 3 months [facility]

## 2024-05-13 RX ORDER — LEFLUNOMIDE 20 MG/1
20 TABLET ORAL DAILY
Qty: 100 TABLET | Refills: 1 | Status: SHIPPED | OUTPATIENT
Start: 2024-05-13

## 2024-05-13 NOTE — TELEPHONE ENCOUNTER
Requested Prescriptions     Pending Prescriptions Disp Refills    LEFLUNOMIDE 20 MG Oral Tab [Pharmacy Med Name: LEFLUNOMIDE  20MG  TAB] 100 tablet 2     Sig: TAKE 1 TABLET BY MOUTH DAILY     LF: 8/7/23  LOV: 2/7/24  Future Appointments   Date Time Provider Department [place]   6/3/2024  2:00 PM [facility] RN RHEUMATOLOGY [facility]   8/7/2024  2:30 PM Marija Hernandez MD [facility]   8/26/2024  2:00 PM [address]     Labs:   Component      Latest Ref Rn 3/26/2024   Glucose      70 - 99 mg/dL 109 (H)    Sodium      136 - 145 mmol/L 142    Potassium      3.5 - 5.1 mmol/L 5.1    Chloride      98 - 112 mmol/L 112    Carbon Dioxide, Total      21.0 - 32.0 mmol/L 23.0    ANION GAP      0 - 18 mmol/L 7    BUN      9 - 23 mg/dL 26 (H)    CREATININE      0.55 - 1.02 mg/dL 1.07 (H)    BUN/CREATININE RATIO      10.0 - 20.0  24.3 (H)    CALCIUM      8.7 - 10.4 mg/dL 9.6    CALCULATED OSMOLALITY      275 - 295 mOsm/kg 299 (H)    EGFR      >=60 mL/min/1.73m2 53 (L)    ALT (SGPT)      10 - 49 U/L 11    AST (SGOT)      <=34 U/L 20    ALKALINE PHOSPHATASE      55 - 142 U/L 49 (L)    Total Bilirubin      0.2 - 1.1 mg/dL 0.2    PROTEIN, TOTAL      5.7 - 8.2 g/dL 7.2    Albumin      3.2 - 4.8 g/dL 4.3    Globulin      2.8 - 4.4 g/dL 2.9    A/G Ratio      1.0 - 2.0  1.5    Patient Fasting for CMP? No    WBC      4.0 - 11.0 x10(3) uL 8.6    RBC      3.80 - 5.30 x10(6)uL 3.31 (L)    Hemoglobin      12.0 - 16.0 g/dL 10.3 (L)    Hematocrit      35.0 - 48.0 % 32.4 (L)    MCV      80.0 - 100.0 fL 97.9    MCH      26.0 - 34.0 pg 31.1    MCHC      31.0 - 37.0 g/dL 31.8    RDW      11.0 - 15.0 % 13.4    RDW-SD      35.1 - 46.3 fL 47.9 (H)    Platelet Count      150.0 - 450.0 10(3)uL 271.0    SED RATE      0 - 30 mm/Hr 20    C-REACTIVE PROTEIN      <1.00 mg/dL <0.40       Legend:  (H) High  (L) Low    Summary:  1. Cont.  Sulfasalazine 1000mg twice a day -     2. Cont.  lelfunomide  20mg a day -    3. Cont.  cimzi  400mg a month. -   4. Return to clinic in 6 months.   5. Labs today and then in 6 months.      Marija Hernandez MD  2/7/2024   4:42 PM

## 2024-06-03 ENCOUNTER — NURSE ONLY (OUTPATIENT)
Dept: RHEUMATOLOGY | Facility: CLINIC | Age: 79
End: 2024-06-03
Payer: COMMERCIAL

## 2024-06-03 DIAGNOSIS — M06.00 RHEUMATOID ARTHRITIS WITH NEGATIVE RHEUMATOID FACTOR, INVOLVING UNSPECIFIED SITE (HCC): Primary | ICD-10-CM

## 2024-06-03 NOTE — PROGRESS NOTES
Name and  verified. Pt aware she was to receive injection of Cimza. Injections given Bilateral Lower Abdomen  subcutaneous and pt tolerated well. Possible local side effects discussed with pt. Pt aware to follow up in 4 WEEKS with provider in 2 month after labs completed.        Future Appointments   Date Time Provider Department Center   2024  2:00 PM [facility] RN RHEUMATOLOGY [facility]   2024  2:30 PM Marija Hernandez MD [facility]   2024  2:00 PM [address]

## 2024-06-05 RX ORDER — SIMVASTATIN 40 MG
40 TABLET ORAL NIGHTLY
Qty: 100 TABLET | Refills: 2 | Status: SHIPPED | OUTPATIENT
Start: 2024-06-05

## 2024-06-05 RX ORDER — HYDROCHLOROTHIAZIDE 12.5 MG/1
12.5 TABLET ORAL DAILY
Qty: 100 TABLET | Refills: 2 | Status: SHIPPED | OUTPATIENT
Start: 2024-06-05

## 2024-06-05 RX ORDER — GABAPENTIN 600 MG/1
600 TABLET ORAL 2 TIMES DAILY
Qty: 200 TABLET | Refills: 2 | Status: SHIPPED | OUTPATIENT
Start: 2024-06-05

## 2024-06-05 NOTE — TELEPHONE ENCOUNTER
Refill passed per [facility] protocol.   Requested Prescriptions   Pending Prescriptions Disp Refills    GABAPENTIN 600 MG Oral Tab [Pharmacy Med Name: Gabapentin 600 MG Oral Tablet] 200 tablet 2     Sig: TAKE 1 TABLET BY MOUTH TWICE  DAILY       Neurology Medications Passed - 6/2/2024  9:56 PM        Passed - In person appointment or virtual visit in the past 6 mos or appointment in next 3 mos     Recent Outpatient Visits              2 days ago Rheumatoid arthritis with negative rheumatoid factor, involving unspecified site ([facility])    [facility]    Nurse Only    1 month ago Rheumatoid arthritis with negative rheumatoid factor, involving unspecified site ([facility])    [facility]    Nurse Only    2 months ago Rheumatoid arthritis with negative rheumatoid factor, involving unspecified site ([facility])    [facility]    Nurse Only    2 months ago Medicare annual wellness visit, subsequent    [facility] Lane Khan MD    Office Visit    3 months ago Rheumatoid arthritis with negative rheumatoid factor, involving unspecified site ([facility])    [facility]    Nurse Only          Future Appointments         Provider Department Appt Notes    In 3 weeks [facility] RN RHEUMATOLOGY [facility] Lizy    In 2 months [address] [facility]     In 3 months Marija Hernandez MD [facility] Pain level                      SIMVASTATIN 40 MG Oral Tab [Pharmacy Med Name: Simvastatin 40 MG Oral Tablet] 100 tablet 2     Sig: TAKE 1 TABLET BY MOUTH EVERY  NIGHT       Cholesterol Medication Protocol Passed - 6/2/2024  9:56 PM        Passed - ALT < 80     Lab Results   Component Value Date    ALT 11 03/26/2024              Passed - ALT resulted within past year        Passed - Lipid panel within past 12 months     Lab Results   Component Value Date    CHOLEST 176 03/26/2024    TRIG 184 (H) 03/26/2024    HDL 59 03/26/2024    LDL 86 03/26/2024    VLDL 29 03/26/2024    NONHDLC 117 03/26/2024             Passed - In person appointment or virtual visit in the past 12 mos or appointment in next 3 mos     Recent Outpatient Visits              2 days ago Rheumatoid arthritis with negative rheumatoid factor, involving unspecified site ([facility])    [facility]    Nurse Only    1 month ago Rheumatoid arthritis with negative rheumatoid factor, involving unspecified site ([facility])    [facility]    Nurse Only    2 months ago Rheumatoid arthritis with negative rheumatoid factor, involving unspecified site ([facility])    [facility]    Nurse Only    2 months ago Medicare annual wellness visit, subsequent    [facility] Lane Khan MD    Office Visit    3 months ago Rheumatoid arthritis with negative rheumatoid factor, involving unspecified site ([facility])    [facility]    Nurse Only          Future Appointments         Provider Department Appt Notes    In 3 weeks [facility] RN RHEUMATOLOGY [facility] Lizy    In 2 months [address] [facility]     In 3 months Marija Hernandez MD [facility] Pain level                      HYDROCHLOROTHIAZIDE 12.5 MG Oral Tab [Pharmacy Med Name: hydroCHLOROthiazide 12.5 MG Oral Tablet] 100 tablet 2     Sig: TAKE 1 TABLET BY MOUTH DAILY       Hypertension Medications Protocol Passed - 6/2/2024  9:56 PM        Passed - CMP or BMP in past 12 months        Passed - Last BP reading less than 140/90     BP  Readings from Last 1 Encounters:   03/26/24 110/65               Passed - In person appointment or virtual visit in the past 12 mos or appointment in next 3 mos     Recent Outpatient Visits              2 days ago Rheumatoid arthritis with negative rheumatoid factor, involving unspecified site ([facility])    [facility]    Nurse Only    1 month ago Rheumatoid arthritis with negative rheumatoid factor, involving unspecified site ([facility])    [facility]    Nurse Only    2 months ago Rheumatoid arthritis with negative rheumatoid factor, involving unspecified site ([facility])    [facility]    Nurse Only    2 months ago Medicare annual wellness visit, subsequent    [facility] [address] Khan MD    Office Visit    3 months ago Rheumatoid arthritis with negative rheumatoid factor, involving unspecified site ([facility])    [facility]    Nurse Only          Future Appointments         Provider Department Appt Notes    In 3 weeks [facility] RN RHEUMATOLOGY [facility] Cimzia    In 2 months [address] [facility] [place] [facility]     In 3 months Marija Hernandez MD [facility] Pain level                    Passed - EGFRCR or GFRNAA > 50     GFR Evaluation  EGFRCR: 53 , resulted on 3/26/2024              Recent Outpatient Visits              2 days ago Rheumatoid arthritis with negative rheumatoid factor, involving unspecified site ([facility])    [facility]    Nurse Only    1 month ago Rheumatoid arthritis with negative rheumatoid factor, involving unspecified site ([facility])    [facility]    Nurse Only    2 months ago Rheumatoid arthritis with negative rheumatoid  factor, involving unspecified site (H[facility])    [facility]    Nurse Only    2 months ago Medicare annual wellness visit, subsequent    [facility] [address] Khan MD    Office Visit    3 months ago Rheumatoid arthritis with negative rheumatoid factor, involving unspecified site ([facility])    [facility]    Nurse Only           Future Appointments         Provider Department Appt Notes    In 3 weeks [facility] RN RHEUMATOLOGY [facility] Lizy    In 2 months [address]     In 3 months Marija Hernandez MD [facility] Pain level

## 2024-07-01 ENCOUNTER — NURSE ONLY (OUTPATIENT)
Dept: RHEUMATOLOGY | Facility: CLINIC | Age: 79
End: 2024-07-01
Payer: COMMERCIAL

## 2024-07-01 DIAGNOSIS — M06.00 RHEUMATOID ARTHRITIS WITH NEGATIVE RHEUMATOID FACTOR, INVOLVING UNSPECIFIED SITE (HCC): Primary | ICD-10-CM

## 2024-07-01 NOTE — PROGRESS NOTES
Name and  verified. Pt aware she was to receive injection of Cimza. Injections given in Left and Right Lower Abdomen and pt tolerated well. Possible local side effects discussed with pt. Pt aware to follow up in 4 weeks for next injection.

## 2024-07-25 ENCOUNTER — TELEPHONE (OUTPATIENT)
Dept: RHEUMATOLOGY | Facility: CLINIC | Age: 79
End: 2024-07-25

## 2024-07-30 ENCOUNTER — NURSE ONLY (OUTPATIENT)
Dept: RHEUMATOLOGY | Facility: CLINIC | Age: 79
End: 2024-07-30
Payer: COMMERCIAL

## 2024-07-30 DIAGNOSIS — M06.00 RHEUMATOID ARTHRITIS WITH NEGATIVE RHEUMATOID FACTOR, INVOLVING UNSPECIFIED SITE (HCC): Primary | ICD-10-CM

## 2024-07-30 PROCEDURE — 96372 THER/PROPH/DIAG INJ SC/IM: CPT | Performed by: INTERNAL MEDICINE

## 2024-07-30 NOTE — PROGRESS NOTES
Name and  verified. Pt aware She was to receive injection of Cimza. Injections given  Bilateral Lower Abdomen subcutaneous and pt tolerated well. Possible local side effects discussed with pt. Pt aware to follow up in 4 weeks for next injection and to follow up with provider in 2 month after labs completed      Future Appointments   Date Time Provider Department Center   2024  2:00 PM [facility] EM [facility]   2024  2:00 PM [facility] RN RHEUMATOLOGY [facility]   9/3/2024  2:20 PM Lane Khan MD [facility]   2024  2:50 PM Marija Hernandez MD [facility]

## 2024-08-26 ENCOUNTER — HOSPITAL ENCOUNTER (OUTPATIENT)
Dept: MAMMOGRAPHY | Facility: HOSPITAL | Age: 79
Discharge: HOME | End: 2024-08-26
Attending: INTERNAL MEDICINE
Payer: MEDICARE

## 2024-08-26 DIAGNOSIS — Z12.31 VISIT FOR SCREENING MAMMOGRAM: ICD-10-CM

## 2024-08-26 PROCEDURE — 77063 BREAST TOMOSYNTHESIS BI: CPT | Performed by: INTERNAL MEDICINE

## 2024-08-26 PROCEDURE — 77067 SCR MAMMO BI INCL CAD: CPT | Performed by: INTERNAL MEDICINE

## 2024-08-27 ENCOUNTER — NURSE ONLY (OUTPATIENT)
Dept: RHEUMATOLOGY | Facility: CLINIC | Age: 79
End: 2024-08-27
Payer: COMMERCIAL

## 2024-08-27 DIAGNOSIS — M06.00 RHEUMATOID ARTHRITIS WITH NEGATIVE RHEUMATOID FACTOR, INVOLVING UNSPECIFIED SITE (HCC): Primary | ICD-10-CM

## 2024-08-27 PROCEDURE — 96372 THER/PROPH/DIAG INJ SC/IM: CPT | Performed by: INTERNAL MEDICINE

## 2024-08-27 NOTE — PROGRESS NOTES
Name and  verified. Pt aware she was to receive injection of Cimza. Injections given Bilateral Lower Abdomen and pt tolerated well. Possible local side effects discussed with pt. Pt aware to follow up in 4 weeks for next injection and to follow up with provider in 1 month after labs completed.        Future Appointments   Date Time Provider Department Center   9/3/2024  2:20 PM Lane Khan MD [facility]   2024  2:50 PM Marija Hernandez MD [facility]

## 2024-09-03 ENCOUNTER — OFFICE VISIT (OUTPATIENT)
Dept: INTERNAL MEDICINE CLINIC | Facility: CLINIC | Age: 79
End: 2024-09-03
Payer: COMMERCIAL

## 2024-09-03 ENCOUNTER — LAB ENCOUNTER (OUTPATIENT)
Dept: LAB | Age: 79
End: 2024-09-03
Attending: INTERNAL MEDICINE
Payer: MEDICARE

## 2024-09-03 VITALS
HEART RATE: 70 BPM | SYSTOLIC BLOOD PRESSURE: 133 MMHG | WEIGHT: 104 LBS | HEIGHT: 58 IN | BODY MASS INDEX: 21.83 KG/M2 | DIASTOLIC BLOOD PRESSURE: 72 MMHG

## 2024-09-03 DIAGNOSIS — M06.00 RHEUMATOID ARTHRITIS WITH NEGATIVE RHEUMATOID FACTOR, INVOLVING UNSPECIFIED SITE (HCC): ICD-10-CM

## 2024-09-03 DIAGNOSIS — I10 PRIMARY HYPERTENSION: Primary | ICD-10-CM

## 2024-09-03 DIAGNOSIS — E11.9 DIABETES MELLITUS TYPE 2 WITHOUT RETINOPATHY (HCC): ICD-10-CM

## 2024-09-03 DIAGNOSIS — N18.31 STAGE 3A CHRONIC KIDNEY DISEASE (HCC): Chronic | ICD-10-CM

## 2024-09-03 DIAGNOSIS — Z95.1 S/P CABG X 3: ICD-10-CM

## 2024-09-03 DIAGNOSIS — M21.372 LEFT FOOT DROP: ICD-10-CM

## 2024-09-03 DIAGNOSIS — I10 PRIMARY HYPERTENSION: ICD-10-CM

## 2024-09-03 DIAGNOSIS — Z51.81 THERAPEUTIC DRUG MONITORING: ICD-10-CM

## 2024-09-03 PROBLEM — N18.30 CKD (CHRONIC KIDNEY DISEASE) STAGE 3, GFR 30-59 ML/MIN (HCC): Chronic | Status: ACTIVE | Noted: 2024-09-03

## 2024-09-03 PROBLEM — J41.0 SMOKERS' COUGH (HCC): Chronic | Status: ACTIVE | Noted: 2024-09-03

## 2024-09-03 LAB
ALBUMIN SERPL-MCNC: 4.4 G/DL (ref 3.2–4.8)
ALBUMIN/GLOB SERPL: 1.5 {RATIO} (ref 1–2)
ALP LIVER SERPL-CCNC: 57 U/L
ALT SERPL-CCNC: 19 U/L
ANION GAP SERPL CALC-SCNC: 6 MMOL/L (ref 0–18)
AST SERPL-CCNC: 25 U/L (ref ?–34)
BILIRUB SERPL-MCNC: 0.3 MG/DL (ref 0.2–1.1)
BUN BLD-MCNC: 31 MG/DL (ref 9–23)
BUN/CREAT SERPL: 30.1 (ref 10–20)
CALCIUM BLD-MCNC: 9.7 MG/DL (ref 8.7–10.4)
CHLORIDE SERPL-SCNC: 111 MMOL/L (ref 98–112)
CO2 SERPL-SCNC: 24 MMOL/L (ref 21–32)
CREAT BLD-MCNC: 1.03 MG/DL
CRP SERPL-MCNC: <0.4 MG/DL (ref ?–1)
DEPRECATED RDW RBC AUTO: 48.8 FL (ref 35.1–46.3)
EGFRCR SERPLBLD CKD-EPI 2021: 56 ML/MIN/1.73M2 (ref 60–?)
ERYTHROCYTE [DISTWIDTH] IN BLOOD BY AUTOMATED COUNT: 13 % (ref 11–15)
ERYTHROCYTE [SEDIMENTATION RATE] IN BLOOD: 12 MM/HR
FASTING STATUS PATIENT QL REPORTED: NO
GLOBULIN PLAS-MCNC: 3 G/DL (ref 2–3.5)
GLUCOSE BLD-MCNC: 101 MG/DL (ref 70–99)
HCT VFR BLD AUTO: 34.6 %
HGB BLD-MCNC: 10.8 G/DL
MCH RBC QN AUTO: 31.7 PG (ref 26–34)
MCHC RBC AUTO-ENTMCNC: 31.2 G/DL (ref 31–37)
MCV RBC AUTO: 101.5 FL
OSMOLALITY SERPL CALC.SUM OF ELEC: 299 MOSM/KG (ref 275–295)
PLATELET # BLD AUTO: 272 10(3)UL (ref 150–450)
POTASSIUM SERPL-SCNC: 4.9 MMOL/L (ref 3.5–5.1)
PROT SERPL-MCNC: 7.4 G/DL (ref 5.7–8.2)
RBC # BLD AUTO: 3.41 X10(6)UL
SODIUM SERPL-SCNC: 141 MMOL/L (ref 136–145)
WBC # BLD AUTO: 9.9 X10(3) UL (ref 4–11)

## 2024-09-03 PROCEDURE — 1126F AMNT PAIN NOTED NONE PRSNT: CPT | Performed by: INTERNAL MEDICINE

## 2024-09-03 PROCEDURE — 86140 C-REACTIVE PROTEIN: CPT

## 2024-09-03 PROCEDURE — 36415 COLL VENOUS BLD VENIPUNCTURE: CPT

## 2024-09-03 PROCEDURE — G0009 ADMIN PNEUMOCOCCAL VACCINE: HCPCS | Performed by: INTERNAL MEDICINE

## 2024-09-03 PROCEDURE — 3008F BODY MASS INDEX DOCD: CPT | Performed by: INTERNAL MEDICINE

## 2024-09-03 PROCEDURE — 85652 RBC SED RATE AUTOMATED: CPT

## 2024-09-03 PROCEDURE — 3075F SYST BP GE 130 - 139MM HG: CPT | Performed by: INTERNAL MEDICINE

## 2024-09-03 PROCEDURE — 99214 OFFICE O/P EST MOD 30 MIN: CPT | Performed by: INTERNAL MEDICINE

## 2024-09-03 PROCEDURE — 85027 COMPLETE CBC AUTOMATED: CPT

## 2024-09-03 PROCEDURE — 90677 PCV20 VACCINE IM: CPT | Performed by: INTERNAL MEDICINE

## 2024-09-03 PROCEDURE — 80053 COMPREHEN METABOLIC PANEL: CPT

## 2024-09-03 PROCEDURE — 3078F DIAST BP <80 MM HG: CPT | Performed by: INTERNAL MEDICINE

## 2024-09-16 NOTE — PROGRESS NOTES
Estrellita Escalante is a 69year old female. HPI:   Patient presents with:  Rheumatoid Arthritis  Medication Follow-Up: Danielle Paez  Lab Results  Hand Pain: Left      I had the pleasure of seeing Estrellita Escalante on 1/22/2020.   As you recall she is extremely pleasa doing well. 5/25/2017  She tried ssz - but it was so nauseated with it. She took 1000mg bid x 1 week but felt bad and felt terrible. She is taking tylenol arthritis 650mg , generic - two in am and two at night.    She then takes two tablets three time sulfaalzine 1000mg tid. She has zofran. She's not sure. The arthriitis is not bad today. sometiems the pain is very bad. Her right elbow give hs rpain her wrists are really bad. She has a hard time moving her hands.    She can't lift glass easily with one control. Her left arm is healed now. She is watching her walking and making sure she doesn't trip b/c of her left foot drop. She finished PT for 4 months on left arm and foot. She has 1/10 pain. She is getting DEXA today.      1/22/2020   She is s LEFLUNOMIDE 20 MG Oral Tab TAKE 1 TABLET EVERY DAY 90 tablet 0   • gabapentin 600 MG Oral Tab Take 1 tablet (600 mg total) by mouth 2 (two) times daily.  180 tablet 3   • metFORMIN HCl 500 MG Oral Tab Take 1 tablet (500 mg total) by mouth 2 (two) times mariela Left foot drop -   Mild tender in left 1st toe   Left arm in a sling  No heel pain   No right calf tendnerss. No tendneress in mtps at this time/   No left hip tendneress.    No edema    Component      Latest Ref Rng & Units 11/15/2019   WBC      4.0 - Large left paracentral disc protrusion at L4-L5 with effacement of the left lateral and subarticular recesses and mass effect upon the left L4 nerve root is new since 2/4/2019.      Extension of the disc protrusion into the left L4-L5 neural foramen with RA activity -  1/2019 -   FRAX  Score   The ten year probability of fracture (%)without BM  Major osteoporosis  19%  Hip fracture 7.7  (OFF Frax website:  Consider FDA-approved medical therapies in postmenopausal women and men aged 48 years and older, base Never smoker

## 2024-09-24 ENCOUNTER — OFFICE VISIT (OUTPATIENT)
Dept: RHEUMATOLOGY | Facility: CLINIC | Age: 79
End: 2024-09-24
Payer: COMMERCIAL

## 2024-09-24 VITALS
RESPIRATION RATE: 16 BRPM | HEART RATE: 73 BPM | BODY MASS INDEX: 22.01 KG/M2 | SYSTOLIC BLOOD PRESSURE: 166 MMHG | HEIGHT: 58 IN | WEIGHT: 104.88 LBS | DIASTOLIC BLOOD PRESSURE: 75 MMHG

## 2024-09-24 DIAGNOSIS — E55.9 VITAMIN D DEFICIENCY: ICD-10-CM

## 2024-09-24 DIAGNOSIS — M81.0 AGE-RELATED OSTEOPOROSIS WITHOUT CURRENT PATHOLOGICAL FRACTURE: ICD-10-CM

## 2024-09-24 DIAGNOSIS — M06.00 RHEUMATOID ARTHRITIS WITH NEGATIVE RHEUMATOID FACTOR, INVOLVING UNSPECIFIED SITE (HCC): Primary | ICD-10-CM

## 2024-09-24 DIAGNOSIS — Z51.81 THERAPEUTIC DRUG MONITORING: ICD-10-CM

## 2024-09-24 PROCEDURE — 1160F RVW MEDS BY RX/DR IN RCRD: CPT | Performed by: INTERNAL MEDICINE

## 2024-09-24 PROCEDURE — 1159F MED LIST DOCD IN RCRD: CPT | Performed by: INTERNAL MEDICINE

## 2024-09-24 PROCEDURE — 1125F AMNT PAIN NOTED PAIN PRSNT: CPT | Performed by: INTERNAL MEDICINE

## 2024-09-24 PROCEDURE — 99214 OFFICE O/P EST MOD 30 MIN: CPT | Performed by: INTERNAL MEDICINE

## 2024-09-24 PROCEDURE — 3077F SYST BP >= 140 MM HG: CPT | Performed by: INTERNAL MEDICINE

## 2024-09-24 PROCEDURE — 3008F BODY MASS INDEX DOCD: CPT | Performed by: INTERNAL MEDICINE

## 2024-09-24 PROCEDURE — 96372 THER/PROPH/DIAG INJ SC/IM: CPT | Performed by: INTERNAL MEDICINE

## 2024-09-24 PROCEDURE — 3078F DIAST BP <80 MM HG: CPT | Performed by: INTERNAL MEDICINE

## 2024-09-24 RX ORDER — LEFLUNOMIDE 20 MG/1
20 TABLET ORAL DAILY
Qty: 100 TABLET | Refills: 1 | Status: SHIPPED | OUTPATIENT
Start: 2024-10-01

## 2024-09-24 NOTE — PROGRESS NOTES
Nikki Mensah is a 79 year old female.    HPI:     Chief Complaint   Patient presents with    Rheumatoid Arthritis    Medication Follow-Up    Lab Results    Hand Pain     Right       I had the pleasure of seeing Nikki Mensah .   As you recall she is extremely pleasant 75-year-old who's a history of seronegative rheumatoid arthritis. Since then, she's been doing very well on  methotrexate 17.5 mg a week and sulfasalazine 500 mg once a day. She is doing well on lower the dose of her meds. She occl has wrist pain. She's not having a pain. She hasn't been walking. She's at the [facility] in the am.   She's going to a wedding in [place] and a cruise in jan.   Overall no interval illnesses.         7/23/2018  She's doing well. She's tolerating cimzia 400mg a months -   She's also on lelfunomide 20mg every day and ssz 1000mg tid.   She is not able to close her left hand completley - but not changed over the last 1 year. [facility] feels her pain is so much nile.r   Her right elbow also hurts.     10/29/2018  She is not having a flare. She does get 5/10 pain in her hands.   She doubled her leflunomide for 1 week while waiting for ssz - . D/w her not to do this in the future.   Her hands have the most issues.   She had her flu shot  No illnesses.   She doesn't feel like she can taper the ssz or lelfunomide yet. She still gets pains in her hands.     1/16/2019  She is doing well on cimiza 400mg a month, . She can't make a toatal fist with the left hand but it's getting better. She has 4/10 pain. She still has right wrist pain that's mild.   She feels better.   No coughs, no fevers.   She does pool work every day.     5/20/2019  She tripped 1 month ago - fractured her left2 humerus.   She's having left 1st toe.   She had sciatic in 2/2019 and 3/2019  - had two sx -    She ha sresidual  Left 1st toe numbness   She has lost stregnth in her left foot - she has a left foot drop   She has physical therapy for left leg.   Dr. Hoover , neurosx - -  did her back sx - before the sx - she did have some  problesm with the the left foot and toe - had a left foot drop  Sciatica pain is better  The RA is not bad. She feels it's mostly her arm being in a sling that she's not being able to do things.     8/26/2019  She had sciatica pian in 1/2019 0 and has had left foot drop since then. She is going to see if it gets better in a few years.   Her RA is under control.   Her left arm is healed now.   She is watching her walking and making sure she doesn't trip b/c of her left foot drop.   She finished PT for 4 months on left arm and foot.   She has 1/10 pain.   She is getting DEXA today.     1/22/2020   She is still trying to make a fist with her left hand- but almost but not quite. She doesn't have much pain. She has 3/10 in her left hand.   She still has difficulty walkign b/c of her brace. She had a lto of left nerve damage.   She does 2 miles a day. She still dose water aerobics every morning.     6/22/2020  She is oding well.   The arthriits was getting bad for a while b/c she skipped her march and April injeciton of cimzia. She restarted in may 2020.   She feels it the most in her right shoulder and right hand. Her pain is 6-7/10.   She feels moving her hand is really painful.   She doesn't noticed swelling.   She's had right shoulder pain since May or April.   The [facility] is opening but water aerobics - opening in 10/2020    1/25/2021  Her arthritis is not bad today. She hasn't gone back yet.   She's taking tyelnol 600mg for arthritis.   Her right shoulder is doing fine. Her right hand is doing real well. She can't make a fist with her right ahnd but not with her left hand.   She has about 3/10 pain.   She thinks her right shoulder pain was b/c she missed 3 months of cimzia shots.     7/21/2021  The summer is going quickly.   She feels pretty good. The left hand is always bothering her more than her right hand.   Her left wrist is tender.   She has 1/10 pain.   She is  getting her cimizia today.   Her right shoulder is ok now.   Her family is ok.   No side effects with covid vaccine.   She is forgetting  To take afternoon ssz - and she is takign 1000mg bid really.     12/28/2021   She is doing well, she has pain I her left 1-5th knuckles it's just started 1 week.   She has a little tenderness. She has 3/10 pain.   She has been on the medicare advantage plan since 1/2021 - so now it's $650 -     6/27/2022  She has a left drop foot and getting peroneal nerve sx at [facility] on 6/30.   She stopped taking some of her meds to prepare for the surgery. She stopped metoprolol so her bp is very high right now.   She called surgeons office about cimzia - told to hold 1 week before and 2 weeks after - so she is taking if on 7/14.   She's taking leflunomide 20mg a day and ssz 1000mg bid - this is ok.   Her pain is ok - occl left hand pain. Overall doing well.       1/13/2023  She has left common peroneal nerve decompiression and neuroplasty on 6/30/2022 - seh ahs hs xon chronic left foot drop that worsene.   She has more right hand pain - in he raplm and her right 4th finger. She gets a right 4th trigger ringer.   It's not bad today.   She has ano pain today.   She is mild soresns in her right 4th finger.   She overall is ok and stable on the cimzia , leflunomide and ssz .     8/9/2023   She still does her treatdmill daily   She gets a local nerve   She still does her water aerobics   She can move her left foot up more than last time - partialy better.   Her fingers doenst' bothe rher .   She can still makea  fist but her left one is better.   Her right 4th finger is better.     2/7/2024  Her right hand hurts the most. She can almost make a fist but it's harder than before.   Her left  hand hurts less.   The hands don't hurt all the time.   Sometimes her right 4th finger hurts at night - that's the worst of it.     She's still doing the water aerobics and treadmill.     9/24/2024  She has  ongoing pain in her right hand.   She notices that she has to pry her fingers open from holding dumbells while doing water aerobics. She has trouble opening her hand to release the dumbells.   Like trigger finger in her right 3rd and right 4th fingers  She hasn't gotten her flu shot or covid yet.   Overall she is doing well and doing her water aerobics      HISTORY:  Past Medical History:    Amblyopia    R eye vision impaired since childhood    Anemia    mixed iron and B12 deficiency    CAD (coronary artery disease)    acute MI and had CABG in 3/2007     Cataracts, bilateral    OU; sees Dr. 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     Gallstone    Incidental finding 2.6 cm gallstone on x-ray lumbar spine 2/18/20.    Gallstones    incidental on MRI lumbar    Humerus fracture    Fx L humerus--sling per Dr. Marks    Hyperlipidemia, mixed    Hypertension, essential    Lumbar disc disease    Osteoarthritis    Osteopenia    dexa 2008-osteopenia.     PONV (postoperative nausea and vomiting)    Preretinal hemorrhage of left eye    OS (not related to diabetes).     Recurrent cold sores    takes acyclovir prn (Dr Briones rx in about 2014)    Rheumatoid arthritis ([facility])    sees Dr Trammell    Type 2 diabetes mellitus ([facility])    oral medication.    Vitamin B12 deficiency    Vitamin B12 level low at 126 on 7/28/17.      Social Hx Reviewed   Family Hx Reviewed     Medications (Active prior to today's visit):  Current Outpatient Medications   Medication Sig Dispense Refill    Vitamin D-Vitamin K (VITAMIN K2-VITAMIN D3 OR) Take by mouth.      gabapentin 600 MG Oral Tab Take 1 tablet (600 mg total) by mouth 2 (two) times daily. 200 tablet 2    simvastatin 40 MG Oral Tab Take 1 tablet (40 mg total) by mouth nightly. 100 tablet 2    hydroCHLOROthiazide 12.5 MG Oral Tab Take 1 tablet (12.5 mg total) by mouth daily.  100 tablet 2    leflunomide 20 MG Oral Tab Take 1 tablet (20 mg total) by mouth daily. 100 tablet 1    metFORMIN 500 MG Oral Tab Take 1 tablet (500 mg total) by mouth daily with breakfast. 100 tablet 2    lisinopril 10 MG Oral Tab Take 1 tablet (10 mg total) by mouth daily. 100 tablet 3    sulfaSALAzine 500 MG Oral Tab Take 2 tablets (1,000 mg total) by mouth 2 (two) times daily. 400 tablet 2    metoprolol tartrate 50 MG Oral Tab Take 1 tablet (50 mg total) by mouth 2 (two) times daily. 200 tablet 3    Glucose Blood (TRUE METRIX BLOOD GLUCOSE TEST) In Vitro Strip 1 each by In Vitro route daily. Use to check blood sugar once daily 100 strip 3    aspirin 81 MG Oral Tab EC Take 1 tablet (81 mg total) by mouth once.      LYSINE OR Take 1 tablet by mouth daily.      Certolizumab Pegol (CIMZIA PREFILLED) 2 X 200 MG/ML Subcutaneous Kit Inject 400 mg into the skin every 28 days. 1 kit 3    Acetaminophen  MG Oral Tab CR Take 2 tablets (1,300 mg total) by mouth in the morning and 2 tablets (1,300 mg total) before bedtime.  1       Allergies:  No Known Allergies      ROS:   All other ROS are negative.     PHYSICAL EXAM:   HEENT: Clear oropharynx, no oral ulcers, EOM intact, clear sclear, PERRLA, pleasant, no acute distress, no CAD, no neck tendnerness, good ROM,   No rashes  CVS: RRR, no murmurs  RS: CTAB, no crackles, no rhonchi  ABD: Soft No  Non jotnt pian   nontender, no HSM felt, BS positive  Joint exam:   right wrist and right shoudler not tender , decreased felxion   Left wrist not tende rwith flexion.   B/l thumbs not tender -   Mild tender in shoulders  But rom inatct   Left 3rd finger mild swelling  can't close left  hand completely, still hard fro her.but better  Can close right   Right elbow  contracture deformity - better  -   Left foot drop -  No heel pain   No right calf tendnerss.    No tendneress in mtps at this time/   No left hip tendneress.   Squeeze test negative -   No edema    Component      Latest  Ref Rng 9/3/2024   Glucose      70 - 99 mg/dL 101 (H)    Sodium      136 - 145 mmol/L 141    Potassium      3.5 - 5.1 mmol/L 4.9    Chloride      98 - 112 mmol/L 111    Carbon Dioxide, Total      21.0 - 32.0 mmol/L 24.0    ANION GAP      0 - 18 mmol/L 6    BUN      9 - 23 mg/dL 31 (H)    CREATININE      0.55 - 1.02 mg/dL 1.03 (H)    BUN/CREATININE RATIO      10.0 - 20.0  30.1 (H)    CALCIUM      8.7 - 10.4 mg/dL 9.7    CALCULATED OSMOLALITY      275 - 295 mOsm/kg 299 (H)    EGFR      >=60 mL/min/1.73m2 56 (L)    ALT (SGPT)      10 - 49 U/L 19    AST (SGOT)      <34 U/L 25    ALKALINE PHOSPHATASE      55 - 142 U/L 57    Total Bilirubin      0.2 - 1.1 mg/dL 0.3    PROTEIN, TOTAL      5.7 - 8.2 g/dL 7.4    Albumin      3.2 - 4.8 g/dL 4.4    Globulin      2.0 - 3.5 g/dL 3.0    A/G Ratio      1.0 - 2.0  1.5    Patient Fasting for CMP? No    WBC      4.0 - 11.0 x10(3) uL 9.9    RBC      3.80 - 5.30 x10(6)uL 3.41 (L)    Hemoglobin      12.0 - 16.0 g/dL 10.8 (L)    Hematocrit      35.0 - 48.0 % 34.6 (L)    MCV      80.0 - 100.0 fL 101.5 (H)    MCH      26.0 - 34.0 pg 31.7    MCHC      31.0 - 37.0 g/dL 31.2    RDW      11.0 - 15.0 % 13.0    RDW-SD      35.1 - 46.3 fL 48.8 (H)    Platelet Count      150.0 - 450.0 10(3)uL 272.0    C-REACTIVE PROTEIN      <1.00 mg/dL <0.40    SED RATE      0 - 30 mm/Hr 12       Legend:  (H) High  (L) Low    3/23/2019 mri lumbar spine     Large left paracentral disc protrusion at L4-L5 with effacement of the left lateral and subarticular recesses and mass effect upon the left L4 nerve root is new since 2/4/2019.     Extension of the disc protrusion into the left L4-L5 neural foramen with severe narrowing of the left neural foramen.     Postoperative changes of a left L4-L5 laminotomy.     Enhancement of the left L4 and L5 nerve roots may be secondary to neuritis given recent intervention.    1/16/2019 - dexa  LEFT FEMORAL NECK               BMD:    0.719 gm/sq. cm.            T SCORE:          -1.2               PA LUMBAR SPINE (L1 - L4)               BMD:    0.872 gm/sq. cm.            T SCORE:         -1.6            2/14/2022 - DEXA    LEFT FEMORAL NECK   BMD: 0.727 gm/sq. cm. T SCORE: -1.1 Z SCORE: 1.0       LEFT TOTAL HIP   BMD: 0.931 gm/sq. cm. T SCORE: -0.1 Z SCORE: 1.7       PA LUMBAR SPINE (L1 - L4)   BMD: 0.981 gm/sq. cm. T SCORE: -0.6 Z SCORE: 1.8   ASSESSMENT/PLAN:     1. RA (rheumatoid arthritis) (HCC)  . Seronegative rheumatoid arthritis -   - stable , in remission - gradual changes have happenin her hands    cont. on lelfunomide 20mg a day and ssz 1000mg bid  Cont. cimzia 400mg - started  June 2018 -   Check labs in 6months   - return to clinic in 6 months.     Mostly her function issues is her left foot driop from back sx -    -switched to leflunomide 10mg a day on 3/2016 - from methotrexate 15mg a week   - stopped leflunomide in beginning of 3/2017 - b/c of expense but went back on b/c of her pain being bad   3/2018 - quantiferon gold and hep b and c studies. ,    - cont. h2o aerobics when it reopens   She wants to switch to  Sulfasalazine b/c of insurance coverage - she will switch in march 2017    2.  History of right eye visual loss - does not want to be on Plaquenil, f/u with optho, gets eye exam end of the year - but also gets it for her diabetes   4.  CAD status post CABG a left leg venous grafting-stable   5.  Diabetes type 2-controlled, continue meds  6. Anemia - EDWIN - egd/cscope ok - f/u hematology - but could also be from chronic disease -   7. Osteopenia - 2008 - with hx of RA - makes her high risk for osteoporosiss - given RA activity -  1/2019 -   FRAX  Score   The ten year probability of fracture (%)without BM  Major osteoporosis  19%  Hip fracture 7.7  Check DEXA  - due for dexa in 2/2024 -     8. Left leg sciatica s/p 2 back sx with persistent left foot drop - done by dr. Hoover   - pain is better   Causing her to trip   - gababapnetin  9. 6/29/2021- cataract in right eye  removed, 1/2021 - had left cataract removed.   10.s/p left common peroneal nerve decompression and neuroplasty on 6/30/2022- she will repeat emg in 7/2023 - and wait a litlte longer -not bettery yet.   No left leg pain       Summary:  1. Cont.  Sulfasalazine 1000mg twice a day -     2. Cont.  lelfunomide  20mg a day -    3. Cont.  cimzi 400mg a month. -   4. Return to clinic in 6 months.   5. Labs today and then in 6 months.      Marija Hernandez MD  9/24/2024   3:15 PM       is applicable because the patient's medical record notes reflects the ongoing nature of the continuous longitudinal relationship of care, and the medical record indicates that there is ongoing treatment of a serious/complex medical condition.

## 2024-09-25 NOTE — PROGRESS NOTES
HPI:    Patient ID: Nikki Mensah is a 79 year old female.    HPI    HTN  Long standing history of hypertension     sympotms  :        Headache no  dizziness        no                             Blurred vision no  palpitaionsSyncope no  Chest pain  no  PND  Orthopnea no  Weakness  chronic at baseline chronic joint pain  gait abnormality and left foot drip  Low salt diet    yes    Compliance with exercise stays active  Compliance with medication yes                                            /72   Pulse 70   Ht 4' 10\" (1.473 m)   Wt 104 lb (47.2 kg)   BMI 21.74 kg/m²   Wt Readings from Last 6 Encounters:   09/24/24 104 lb 14.4 oz (47.6 kg)   09/03/24 104 lb (47.2 kg)   03/26/24 111 lb (50.3 kg)   02/23/24 109 lb (49.4 kg)   02/07/24 113 lb 3 oz (51.3 kg)   12/08/23 111 lb (50.3 kg)     Body mass index is 21.74 kg/m².  HGBA1C:    Lab Results   Component Value Date    A1C 5.4 03/26/2024    A1C 5.4 06/21/2023    A1C 5.9 (H) 02/07/2023     03/26/2024         Review of Systems   Constitutional:  Negative for activity change, chills, fatigue and fever.   HENT:  Negative for ear discharge, nosebleeds, postnasal drip, rhinorrhea, sinus pressure and sore throat.    Eyes:  Negative for pain, discharge and redness.   Respiratory:  Negative for cough, chest tightness, shortness of breath and wheezing.    Cardiovascular:  Negative for chest pain, palpitations and leg swelling.   Gastrointestinal:  Negative for abdominal pain, blood in stool, constipation, diarrhea, nausea and vomiting.   Genitourinary:  Negative for difficulty urinating, dysuria, frequency, hematuria and urgency.   Musculoskeletal:  Positive for arthralgias, back pain and gait problem. Negative for joint swelling.   Skin:  Negative for rash.   Neurological:  Positive for weakness and numbness. Negative for syncope, light-headedness and headaches.   Psychiatric/Behavioral:  Negative for dysphoric mood. The patient is not nervous/anxious.           Current Outpatient Medications   Medication Sig Dispense Refill    Vitamin D-Vitamin K (VITAMIN K2-VITAMIN D3 OR) Take by mouth.      gabapentin 600 MG Oral Tab Take 1 tablet (600 mg total) by mouth 2 (two) times daily. 200 tablet 2    simvastatin 40 MG Oral Tab Take 1 tablet (40 mg total) by mouth nightly. 100 tablet 2    hydroCHLOROthiazide 12.5 MG Oral Tab Take 1 tablet (12.5 mg total) by mouth daily. 100 tablet 2    metFORMIN 500 MG Oral Tab Take 1 tablet (500 mg total) by mouth daily with breakfast. 100 tablet 2    lisinopril 10 MG Oral Tab Take 1 tablet (10 mg total) by mouth daily. 100 tablet 3    sulfaSALAzine 500 MG Oral Tab Take 2 tablets (1,000 mg total) by mouth 2 (two) times daily. 400 tablet 2    metoprolol tartrate 50 MG Oral Tab Take 1 tablet (50 mg total) by mouth 2 (two) times daily. 200 tablet 3    Glucose Blood (TRUE METRIX BLOOD GLUCOSE TEST) In Vitro Strip 1 each by In Vitro route daily. Use to check blood sugar once daily 100 strip 3    aspirin 81 MG Oral Tab EC Take 1 tablet (81 mg total) by mouth once.      LYSINE OR Take 1 tablet by mouth daily.      Certolizumab Pegol (CIMZIA PREFILLED) 2 X 200 MG/ML Subcutaneous Kit Inject 400 mg into the skin every 28 days. 1 kit 3    Acetaminophen  MG Oral Tab CR Take 2 tablets (1,300 mg total) by mouth in the morning and 2 tablets (1,300 mg total) before bedtime.  1    [START ON 10/1/2024] leflunomide 20 MG Oral Tab Take 1 tablet (20 mg total) by mouth daily. 100 tablet 1     Allergies:No Known Allergies    HISTORY:  Past Medical History:    Amblyopia    R eye vision impaired since childhood    Anemia    mixed iron and B12 deficiency    CAD (coronary artery disease)    acute MI and had CABG in 3/2007     Cataracts, bilateral    OU; sees Dr. 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     Gallstone    Incidental finding 2.6 cm  gallstone on x-ray lumbar spine 20.    Gallstones    incidental on MRI lumbar    Humerus fracture    Fx L humerus--sling per Dr. Marks    Hyperlipidemia, mixed    Hypertension, essential    Lumbar disc disease    Osteoarthritis    Osteopenia    dexa -osteopenia.     PONV (postoperative nausea and vomiting)    Preretinal hemorrhage of left eye    OS (not related to diabetes).     Recurrent cold sores    takes acyclovir prn (Dr Briones rx in about )    Rheumatoid arthritis (HCC)    sees Dr Trammell    Type 2 diabetes mellitus (HCC)    oral medication.    Vitamin B12 deficiency    Vitamin B12 level low at 126 on 17.      Past Surgical History:   Procedure Laterality Date    Angioplasty (coronary)      Appendectomy  1974    Back surgery  2019     Re-exploration and resection herniated intervertebral disc, L4-L5 for recurrence. Dr. Carlito Hoover      , , 1982    x3    Cabg      Dr Henrry    Cataract Left 2021    Colonoscopy  2017    hemorrhoids otherwise normal    Colonoscopy N/A 2017    Procedure: COLONOSCOPY;  Surgeon: Yg East MD;  Location: [facility] ENDOSCOPY    Decompression; unspecified  2022    L peroneal nerve decompression for foot drop. Dr Hathaway at [facility]    Laminectomy,lumbar Left 2019    Left L4-5 lumbar laminectomy Dr. Hoover       Family History   Problem Relation Age of Onset    Macular degeneration Mother     Other ( age 97 old age) Mother     Heart Disease Father 72        CAD; Cause of death    Stroke Sister 52        CVA    Other (sciatica) Brother     Asthma Son     Pancreatic Cancer Maternal Aunt     Cancer Maternal Aunt 80        pancreatic    Other (1 brother, 2 sisters.) Other     Other (2 sons, 1 daughter) Other     Bleeding Disorders Neg     Clotting Disorder Neg     Breast Cancer Neg     Ovarian Cancer Neg       Social History:   Social History     Socioeconomic History    Marital status:     Occupational History    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   Tobacco Use    Smoking status: Former     Current packs/day: 0.00     Average packs/day: 1 pack/day for 16.0 years (16.0 ttl pk-yrs)     Types: Cigarettes     Start date: 1959     Quit date: 1975     Years since quittin.7     Passive exposure: Past    Smokeless tobacco: Never   Vaping Use    Vaping status: Never Used   Substance and Sexual Activity    Alcohol use: Yes     Alcohol/week: 0.0 standard drinks of alcohol     Comment: rarely    Drug use: No   Other Topics Concern    Caffeine Concern Yes     Comment: Occ    Reaction to local anesthetic No   Social History Narrative    Nikki is  x 40 yrs; mother of 3 adult children. She is no longer worker; formerly worked in billing at [employer]. She lives in [place] with her spouse, Huan, and her two adult sons.      Social Determinants of Health     Financial Resource Strain: Low Risk  (7/3/2024)    Received from [facility]    Overall Financial Resource Strain (CARDIA)     Difficulty of Paying Living Expenses: Not hard at all   Food Insecurity: No Food Insecurity (7/3/2024)    Received from [facility]    Hunger Vital Sign     Worried About Running Out of Food in the Last Year: Never true     Ran Out of Food in the Last Year: Never true   Transportation Needs: No Transportation Needs (7/3/2024)    Received from [facility]    PRAPARE - Transportation     Lack of Transportation (Medical): No     Lack of Transportation (Non-Medical): No   Housing Stability: Low Risk  (7/3/2024)    Received from [facility]    Housing Stability Vital Sign     Unable to Pay for Housing in the Last Year: No     Number of Places Lived in the Last Year: 1     Unstable Housing in the Last Year: No        PHYSICAL EXAM:    Physical Exam  Constitutional:       Appearance: She is well-developed. She is not  ill-appearing.   HENT:      Right Ear: Ear canal normal.      Left Ear: Ear canal normal.      Mouth/Throat:      Pharynx: Oropharynx is clear.   Eyes:      Extraocular Movements: Extraocular movements intact.      Conjunctiva/sclera: Conjunctivae normal.      Pupils: Pupils are equal, round, and reactive to light.   Cardiovascular:      Rate and Rhythm: Normal rate and regular rhythm.      Heart sounds: Normal heart sounds.   Pulmonary:      Effort: Pulmonary effort is normal.      Breath sounds: Normal breath sounds.   Abdominal:      General: Bowel sounds are normal.      Palpations: Abdomen is soft.   Skin:     General: Skin is warm and dry.   Neurological:      Mental Status: She is alert.      Gait: Gait abnormal.   Psychiatric:         Mood and Affect: Mood normal.              ASSESSMENT/PLAN:   (I10) Primary hypertension  (primary encounter diagnosis)  Plan: CBC, Platelet; No Differential, Comp Metabolic         Panel (14)        /72   Pulse 70   Ht 4' 10\" (1.473 m)   Wt 104 lb (47.2 kg)   BMI 21.74 kg/m²   Controlled monitor    (N18.31) Stage 3a chronic kidney disease (HCC)  Plan: chronic stable monitor    (E11.9) Diabetes mellitus type 2 without retinopathy (HCC)  Plan: HGBA1C:    Lab Results   Component Value Date    A1C 5.4 03/26/2024    A1C 5.4 06/21/2023    A1C 5.9 (H) 02/07/2023     03/26/2024     controlledmonitor    (M21.372) Left foot drop  Plan: chronic  fall precaution monitor    (Z95.1) S/P CABG x 3  Plan: Asymptomatic  monitor      (M06.00) Rheumatoid arthritis with negative rheumatoid factor, involving unspecified site ([facility])  Plan: chronic ongoing follo wup with rheumatology  Medications and most recent test results reviewed  Refill medicaitons  as needed  Potential side effect discussed  Modification of risk for CAD advised    Dietary an lifestyle change  Pt voiced understanding and agrees with plan  Pt given time to ask questions  After Visit Summary handout    Discussed   And given to patient.         Orders Placed This Encounter   Procedures    CBC, Platelet; No Differential    Comp Metabolic Panel (14)    Prevnar 20 (PCV20) [59804]       Meds This Visit:  Requested Prescriptions      No prescriptions requested or ordered in this encounter       Imaging & Referrals:  PCV20 VACCINE FOR INTRAMUSCULAR USE        ID#[id number]

## 2024-10-07 DIAGNOSIS — M06.00 RHEUMATOID ARTHRITIS WITH NEGATIVE RHEUMATOID FACTOR, INVOLVING UNSPECIFIED SITE (HCC): ICD-10-CM

## 2024-10-08 NOTE — TELEPHONE ENCOUNTER
LOV: 9/24/24  Future Appointments   Date Time Provider Department Center   10/22/2024  2:00 PM [facility] RN RHEUMATOLOGY [facility]   3/4/2025  2:00 PM Lane Khan MD Psychiatric [facility]     Labs: AST 25 ALT 19  9/3/24  Summary:  1. Cont.  Sulfasalazine 1000mg twice a day -     2. Cont.  lelfunomide  20mg a day -    3. Cont.  cimzi 400mg a month. -   4. Return to clinic in 6 months.   5. Labs today and then in 6 months.      Marija Hernandez MD  9/24/2024   3:15 PM

## 2024-10-09 RX ORDER — SULFASALAZINE 500 MG/1
1000 TABLET ORAL 2 TIMES DAILY
Qty: 400 TABLET | Refills: 3 | Status: SHIPPED | OUTPATIENT
Start: 2024-10-09

## 2024-10-22 ENCOUNTER — NURSE ONLY (OUTPATIENT)
Dept: RHEUMATOLOGY | Facility: CLINIC | Age: 79
End: 2024-10-22
Payer: COMMERCIAL

## 2024-10-22 DIAGNOSIS — M06.00 RHEUMATOID ARTHRITIS WITH NEGATIVE RHEUMATOID FACTOR, INVOLVING UNSPECIFIED SITE (HCC): Primary | ICD-10-CM

## 2024-10-22 PROCEDURE — 96372 THER/PROPH/DIAG INJ SC/IM: CPT | Performed by: INTERNAL MEDICINE

## 2024-11-21 ENCOUNTER — NURSE ONLY (OUTPATIENT)
Dept: RHEUMATOLOGY | Facility: CLINIC | Age: 79
End: 2024-11-21
Payer: COMMERCIAL

## 2024-11-21 DIAGNOSIS — M06.00 RHEUMATOID ARTHRITIS WITH NEGATIVE RHEUMATOID FACTOR, INVOLVING UNSPECIFIED SITE (HCC): Primary | ICD-10-CM

## 2024-11-21 PROCEDURE — 96372 THER/PROPH/DIAG INJ SC/IM: CPT | Performed by: INTERNAL MEDICINE

## 2024-11-21 NOTE — PROGRESS NOTES
Verified name and . Patient aware receiving Cimzia injection. Injections given in bilateral lower abdomen. Patient tolerated well. Patient aware to follow up in 1 month for next injection.    Future Appointments   Date Time Provider Department Center   2024  3:00 PM [facility]   3/4/2025  2:00 PM Lane Khan MD [facility]

## 2024-12-19 ENCOUNTER — NURSE ONLY (OUTPATIENT)
Dept: RHEUMATOLOGY | Facility: CLINIC | Age: 79
End: 2024-12-19
Payer: COMMERCIAL

## 2024-12-19 DIAGNOSIS — M06.00 RHEUMATOID ARTHRITIS WITH NEGATIVE RHEUMATOID FACTOR, INVOLVING UNSPECIFIED SITE (HCC): Primary | ICD-10-CM

## 2024-12-19 PROCEDURE — 96372 THER/PROPH/DIAG INJ SC/IM: CPT | Performed by: INTERNAL MEDICINE

## 2024-12-19 NOTE — PROGRESS NOTES
Verified name and . Patient aware receiving Cimzia injection. Injections given in bilateral lower abdomen. Patient tolerated well. Patient aware to follow up in 1 month for next injection.    Future Appointments   Date Time Provider Department Center   2025  2:00 PM [facility]   3/4/2025  2:00 PM Lane Khan MD [facility] [place]

## 2025-01-13 NOTE — TELEPHONE ENCOUNTER
Please review; protocol failed/No Protocol    Requested Prescriptions   Pending Prescriptions Disp Refills    METOPROLOL TARTRATE 50 MG Oral Tab [Pharmacy Med Name: Metoprolol Tartrate 50 MG Oral Tablet] 200 tablet 2     Sig: TAKE 1 TABLET BY MOUTH TWICE  DAILY       Hypertension Medications Protocol Failed - 1/13/2025 12:44 PM        Failed - Last BP reading less than 140/90     BP Readings from Last 1 Encounters:   09/24/24 (!) 166/75               Passed - CMP or BMP in past 12 months        Passed - In person appointment or virtual visit in the past 12 mos or appointment in next 3 mos     Recent Outpatient Visits              3 weeks ago Rheumatoid arthritis with negative rheumatoid factor, involving unspecified site ([facility])    [facility]    Nurse Only    1 month ago Rheumatoid arthritis with negative rheumatoid factor, involving unspecified site ([facility])    [facility]    Nurse Only    2 months ago Rheumatoid arthritis with negative rheumatoid factor, involving unspecified site ([facility])    [facility]    Nurse Only    3 months ago Rheumatoid arthritis with negative rheumatoid factor, involving unspecified site ([facility])    [facility] Marija Hernandez MD    Office Visit    4 months ago Primary hypertension    [facility] Lane Khan MD    Office Visit          Future Appointments         Provider Department Appt Notes    In 1 week [facility] RN RHEUMATOLOGY [facility] Patient to arrive at 345 pm- Cimzia injections    In 1 month Lane Khan MD [facility] Last AWV 03/26/2024    In 4 months Marija Hernandez MD [facility] Arthritis                    Passed - EGFRCR or GFRNAA >  50     GFR Evaluation  EGFRCR: 56 , resulted on 9/3/2024          Passed - Medication is active on med list           Future Appointments         Provider Department Appt Notes    In 1 week [facility] RN RHEUMATOLOGY [facility] Patient to arrive at 345 pm- Cimzia injections    In 1 month Lane Khan MD [facility] Last AWV 03/26/2024    In 4 months Marija Hernandez MD [facility] Arthritis          Recent Outpatient Visits              3 weeks ago Rheumatoid arthritis with negative rheumatoid factor, involving unspecified site ([facility])    [facility]    Nurse Only    1 month ago Rheumatoid arthritis with negative rheumatoid factor, involving unspecified site ([facility])    [facility]    Nurse Only    2 months ago Rheumatoid arthritis with negative rheumatoid factor, involving unspecified site ([facility])    [facility]    Nurse Only    3 months ago Rheumatoid arthritis with negative rheumatoid factor, involving unspecified site ([facility])    [facility] Marija Hernandez MD    Office Visit    4 months ago Primary hypertension    [facility] Lane Khan MD    Office Visit

## 2025-01-14 RX ORDER — METOPROLOL TARTRATE 50 MG
50 TABLET ORAL 2 TIMES DAILY
Qty: 200 TABLET | Refills: 2 | Status: SHIPPED | OUTPATIENT
Start: 2025-01-14

## 2025-01-22 ENCOUNTER — NURSE ONLY (OUTPATIENT)
Dept: RHEUMATOLOGY | Facility: CLINIC | Age: 80
End: 2025-01-22
Payer: COMMERCIAL

## 2025-01-22 DIAGNOSIS — M06.00 RHEUMATOID ARTHRITIS WITH NEGATIVE RHEUMATOID FACTOR, INVOLVING UNSPECIFIED SITE (HCC): Primary | ICD-10-CM

## 2025-01-22 PROCEDURE — 96372 THER/PROPH/DIAG INJ SC/IM: CPT | Performed by: INTERNAL MEDICINE

## 2025-01-22 NOTE — PROGRESS NOTES
Verified patient name and . Presents for Cimzia injections. Injection given in bilateral lower abdomen. Tolerated injections well. Patient has made next appointment.

## 2025-02-20 ENCOUNTER — NURSE ONLY (OUTPATIENT)
Dept: RHEUMATOLOGY | Facility: CLINIC | Age: 80
End: 2025-02-20
Payer: COMMERCIAL

## 2025-02-20 DIAGNOSIS — M06.00 RHEUMATOID ARTHRITIS WITH NEGATIVE RHEUMATOID FACTOR, INVOLVING UNSPECIFIED SITE (HCC): Primary | ICD-10-CM

## 2025-02-20 PROCEDURE — 96372 THER/PROPH/DIAG INJ SC/IM: CPT | Performed by: INTERNAL MEDICINE

## 2025-02-20 NOTE — PROGRESS NOTES
Verified name and . Patient aware receiving Cimzia injection. Injections given bilateral lower abdomen. Patient tolerated well. Patient aware to follow up in 1 month for next injection.    Future Appointments   Date Time Provider Department Center   3/4/2025  2:00 PM Lane Khan MD [facility]   3/20/2025  2:00 PM [facility] RN RHEUMATOLOGY [facility]   2025  2:50 PM Marija Hernandez MD [facility]

## 2025-02-24 RX ORDER — GABAPENTIN 600 MG/1
600 TABLET ORAL 2 TIMES DAILY
Qty: 180 TABLET | Refills: 1 | Status: SHIPPED | OUTPATIENT
Start: 2025-02-24

## 2025-02-24 RX ORDER — SIMVASTATIN 40 MG
40 TABLET ORAL NIGHTLY
Qty: 90 TABLET | Refills: 3 | Status: SHIPPED | OUTPATIENT
Start: 2025-02-24

## 2025-02-24 NOTE — TELEPHONE ENCOUNTER
Please kindly review this medication    [x] FAILS PROTOCOL    [] HAS NO PROTOCOL ATTACHED    Recent Visits  Date Type Provider Dept   09/03/24 Office Visit Lane Khan MD [facility]   03/26/24 Office Visit Lane Khan MD [facility]   02/23/24 Office Visit Windy Hernandez APRN [facility]   Showing recent visits within past 540 days with a meds authorizing provider and meeting all other requirements  Future Appointments  Date Type Provider Dept   03/04/25 Appointment Lane Khan MD [facility]   Showing future appointments within next 150 days with a meds authorizing provider and meeting all other requirements

## 2025-02-25 RX ORDER — HYDROCHLOROTHIAZIDE 12.5 MG/1
12.5 TABLET ORAL DAILY
Qty: 90 TABLET | Refills: 3 | Status: SHIPPED | OUTPATIENT
Start: 2025-02-25

## 2025-02-25 RX ORDER — LISINOPRIL 10 MG/1
10 TABLET ORAL DAILY
Qty: 90 TABLET | Refills: 3 | Status: SHIPPED | OUTPATIENT
Start: 2025-02-25

## 2025-03-04 ENCOUNTER — LAB ENCOUNTER (OUTPATIENT)
Dept: LAB | Age: 80
End: 2025-03-04
Attending: INTERNAL MEDICINE
Payer: MEDICARE

## 2025-03-04 ENCOUNTER — OFFICE VISIT (OUTPATIENT)
Dept: INTERNAL MEDICINE CLINIC | Facility: CLINIC | Age: 80
End: 2025-03-04
Payer: COMMERCIAL

## 2025-03-04 VITALS
DIASTOLIC BLOOD PRESSURE: 70 MMHG | BODY MASS INDEX: 20.57 KG/M2 | TEMPERATURE: 97 F | WEIGHT: 98 LBS | HEIGHT: 58 IN | SYSTOLIC BLOOD PRESSURE: 127 MMHG | HEART RATE: 80 BPM

## 2025-03-04 DIAGNOSIS — Z12.31 VISIT FOR SCREENING MAMMOGRAM: ICD-10-CM

## 2025-03-04 DIAGNOSIS — Z51.81 THERAPEUTIC DRUG MONITORING: ICD-10-CM

## 2025-03-04 DIAGNOSIS — Z00.00 MEDICARE ANNUAL WELLNESS VISIT, SUBSEQUENT: Primary | ICD-10-CM

## 2025-03-04 DIAGNOSIS — E11.9 DIABETES MELLITUS TYPE 2 WITHOUT RETINOPATHY (HCC): ICD-10-CM

## 2025-03-04 DIAGNOSIS — M48.061 NEURAL FORAMINAL STENOSIS OF LUMBAR SPINE: ICD-10-CM

## 2025-03-04 DIAGNOSIS — M21.372 LEFT FOOT DROP: ICD-10-CM

## 2025-03-04 DIAGNOSIS — N18.31 STAGE 3A CHRONIC KIDNEY DISEASE (HCC): Chronic | ICD-10-CM

## 2025-03-04 DIAGNOSIS — E55.9 VITAMIN D DEFICIENCY: ICD-10-CM

## 2025-03-04 DIAGNOSIS — D64.9 CHRONIC ANEMIA: ICD-10-CM

## 2025-03-04 DIAGNOSIS — M06.00 RHEUMATOID ARTHRITIS WITH NEGATIVE RHEUMATOID FACTOR, INVOLVING UNSPECIFIED SITE (HCC): ICD-10-CM

## 2025-03-04 DIAGNOSIS — E78.2 HYPERLIPIDEMIA, MIXED: ICD-10-CM

## 2025-03-04 DIAGNOSIS — M06.9 RHEUMATOID ARTHRITIS INVOLVING MULTIPLE SITES, UNSPECIFIED WHETHER RHEUMATOID FACTOR PRESENT (HCC): ICD-10-CM

## 2025-03-04 DIAGNOSIS — G57.02 COMMON PERONEAL NEUROPATHY OF LEFT LOWER EXTREMITY: ICD-10-CM

## 2025-03-04 DIAGNOSIS — Z95.1 S/P CABG X 3: ICD-10-CM

## 2025-03-04 LAB
ALBUMIN SERPL-MCNC: 4.5 G/DL (ref 3.2–4.8)
ALBUMIN/GLOB SERPL: 1.5 {RATIO} (ref 1–2)
ALP LIVER SERPL-CCNC: 53 U/L
ALT SERPL-CCNC: 18 U/L
ANION GAP SERPL CALC-SCNC: 9 MMOL/L (ref 0–18)
AST SERPL-CCNC: 25 U/L (ref ?–34)
BILIRUB SERPL-MCNC: 0.3 MG/DL (ref 0.2–1.1)
BILIRUB UR QL: NEGATIVE
BUN BLD-MCNC: 25 MG/DL (ref 9–23)
BUN/CREAT SERPL: 24.3 (ref 10–20)
CALCIUM BLD-MCNC: 9.2 MG/DL (ref 8.7–10.4)
CHLORIDE SERPL-SCNC: 109 MMOL/L (ref 98–112)
CHOLEST SERPL-MCNC: 191 MG/DL (ref ?–200)
CLARITY UR: CLEAR
CO2 SERPL-SCNC: 24 MMOL/L (ref 21–32)
COLOR UR: YELLOW
CREAT BLD-MCNC: 1.03 MG/DL
CREAT UR-SCNC: 106.2 MG/DL
CRP SERPL-MCNC: <0.4 MG/DL (ref ?–1)
DEPRECATED RDW RBC AUTO: 48 FL (ref 35.1–46.3)
EGFRCR SERPLBLD CKD-EPI 2021: 56 ML/MIN/1.73M2 (ref 60–?)
ERYTHROCYTE [DISTWIDTH] IN BLOOD BY AUTOMATED COUNT: 13.2 % (ref 11–15)
ERYTHROCYTE [SEDIMENTATION RATE] IN BLOOD: 17 MM/HR
EST. AVERAGE GLUCOSE BLD GHB EST-MCNC: 105 MG/DL (ref 68–126)
FASTING PATIENT LIPID ANSWER: NO
FASTING STATUS PATIENT QL REPORTED: NO
GLOBULIN PLAS-MCNC: 3 G/DL (ref 2–3.5)
GLUCOSE BLD-MCNC: 106 MG/DL (ref 70–99)
GLUCOSE UR-MCNC: NORMAL MG/DL
HBA1C MFR BLD: 5.3 % (ref ?–5.7)
HCT VFR BLD AUTO: 34.4 %
HDLC SERPL-MCNC: 57 MG/DL (ref 40–59)
HGB BLD-MCNC: 10.6 G/DL
HGB UR QL STRIP.AUTO: NEGATIVE
KETONES UR-MCNC: NEGATIVE MG/DL
LDLC SERPL CALC-MCNC: 100 MG/DL (ref ?–100)
LEUKOCYTE ESTERASE UR QL STRIP.AUTO: 25
MCH RBC QN AUTO: 30.8 PG (ref 26–34)
MCHC RBC AUTO-ENTMCNC: 30.8 G/DL (ref 31–37)
MCV RBC AUTO: 100 FL
MICROALBUMIN UR-MCNC: 15.7 MG/DL
MICROALBUMIN/CREAT 24H UR-RTO: 147.8 UG/MG (ref ?–30)
NITRITE UR QL STRIP.AUTO: NEGATIVE
NONHDLC SERPL-MCNC: 134 MG/DL (ref ?–130)
OSMOLALITY SERPL CALC.SUM OF ELEC: 299 MOSM/KG (ref 275–295)
PH UR: 5 [PH] (ref 5–8)
PLATELET # BLD AUTO: 302 10(3)UL (ref 150–450)
POTASSIUM SERPL-SCNC: 4.7 MMOL/L (ref 3.5–5.1)
PROT SERPL-MCNC: 7.5 G/DL (ref 5.7–8.2)
PROT UR-MCNC: 30 MG/DL
RBC # BLD AUTO: 3.44 X10(6)UL
SODIUM SERPL-SCNC: 142 MMOL/L (ref 136–145)
SP GR UR STRIP: 1.03 (ref 1–1.03)
T4 FREE SERPL-MCNC: 1 NG/DL (ref 0.8–1.7)
TRIGL SERPL-MCNC: 201 MG/DL (ref 30–149)
TSI SER-ACNC: 2.14 UIU/ML (ref 0.55–4.78)
UROBILINOGEN UR STRIP-ACNC: NORMAL
VIT D+METAB SERPL-MCNC: 66.8 NG/ML (ref 30–100)
VLDLC SERPL CALC-MCNC: 34 MG/DL (ref 0–30)
WBC # BLD AUTO: 9.6 X10(3) UL (ref 4–11)

## 2025-03-04 PROCEDURE — 80053 COMPREHEN METABOLIC PANEL: CPT

## 2025-03-04 PROCEDURE — 82043 UR ALBUMIN QUANTITATIVE: CPT

## 2025-03-04 PROCEDURE — 83036 HEMOGLOBIN GLYCOSYLATED A1C: CPT

## 2025-03-04 PROCEDURE — 82306 VITAMIN D 25 HYDROXY: CPT

## 2025-03-04 PROCEDURE — 86480 TB TEST CELL IMMUN MEASURE: CPT

## 2025-03-04 PROCEDURE — 80061 LIPID PANEL: CPT

## 2025-03-04 PROCEDURE — 87086 URINE CULTURE/COLONY COUNT: CPT

## 2025-03-04 PROCEDURE — 82570 ASSAY OF URINE CREATININE: CPT

## 2025-03-04 PROCEDURE — 81001 URINALYSIS AUTO W/SCOPE: CPT

## 2025-03-04 PROCEDURE — 84443 ASSAY THYROID STIM HORMONE: CPT

## 2025-03-04 PROCEDURE — 86140 C-REACTIVE PROTEIN: CPT

## 2025-03-04 PROCEDURE — 84439 ASSAY OF FREE THYROXINE: CPT

## 2025-03-04 PROCEDURE — 85027 COMPLETE CBC AUTOMATED: CPT

## 2025-03-04 PROCEDURE — 36415 COLL VENOUS BLD VENIPUNCTURE: CPT

## 2025-03-04 PROCEDURE — 85652 RBC SED RATE AUTOMATED: CPT

## 2025-03-06 ENCOUNTER — TELEPHONE (OUTPATIENT)
Dept: INTERNAL MEDICINE CLINIC | Facility: CLINIC | Age: 80
End: 2025-03-06

## 2025-03-06 LAB
M TB IFN-G CD4+ T-CELLS BLD-ACNC: 0.02 IU/ML
M TB TUBERC IFN-G BLD QL: NEGATIVE
M TB TUBERC IGNF/MITOGEN IGNF CONTROL: >10 IU/ML
QFT TB1 AG MINUS NIL: 0.02 IU/ML
QFT TB2 AG MINUS NIL: 0.02 IU/ML

## 2025-03-07 PROBLEM — J41.0 SMOKERS' COUGH (HCC): Chronic | Status: RESOLVED | Noted: 2024-09-03 | Resolved: 2025-03-07

## 2025-03-07 NOTE — PROGRESS NOTES
Subjective:   Nikki Mensah is a 79 year old female who presents for a MA AHA (Medicare Advantage Annual Health Assessment) and Subsequent Annual Wellness visit (Pt already had Initial Annual Wellness) and scheduled follow up of multiple significant but stable problems.   Diabetes HTN CKD CAD         Headache no  dizziness        no                             Blurred vision no  palpitaionsSyncope no  Chest pain  no  PND  Orthopnea no  Weakness  chronic fatigue chronic joint pain  Generally healthy  yes    Compliance with exercise stays active  Compliance with medication yes                                            History/Other:   Fall Risk Assessment:   She has been screened for Falls and is High Risk. Fall Prevention information provided to patient in After Visit Summary.    Do you feel unsteady when standing or walking?: (Patient-Rptd) Yes  Do you worry about falling?: (Patient-Rptd) Yes  Have you fallen in the past year?: (Patient-Rptd) Yes  How many times have you fallen?: (Patient-Rptd) (P) 3  Were you injured?: (Patient-Rptd) (P) No     Cognitive Assessment:   She had a completely normal cognitive assessment - see flowsheet entries     Functional Ability/Status:   Nikki Mensah has a completely normal functional assessment. See flowsheet for details.      Depression Screening (PHQ):  PHQ-2 SCORE: 0  , done 3/2/2025            Advanced Directives:   She has a Living Will on file in [facility]; reviewed and discussed documents with patient (and family/surrogate if present).  She has a Power of  for Health Care on file in [facility].  Patient has Advance Care Planning documents present in EMR. Reviewed documents with patient (and family/surrogate if present).      Patient Active Problem List   Diagnosis    RA (rheumatoid arthritis) (HCC)    Diabetes mellitus type 2 without retinopathy (HCC)    S/P CABG x 3    Hyperlipidemia, mixed    Chronic anemia    Left foot drop    Neural foraminal stenosis of lumbar spine     Common peroneal neuropathy of left lower extremity    CKD (chronic kidney disease) stage 3, GFR 30-59 ml/min (Formerly [facility])     Allergies:  She has No Known Allergies.    Current Medications:  Outpatient Medications Marked as Taking for the 3/4/25 encounter (Office Visit) with Lane Khan MD   Medication Sig    hydroCHLOROthiazide 12.5 MG Oral Tab Take 1 tablet (12.5 mg total) by mouth daily.    lisinopril 10 MG Oral Tab Take 1 tablet (10 mg total) by mouth daily.    metFORMIN 500 MG Oral Tab Take 1 tablet (500 mg total) by mouth daily with breakfast.    gabapentin 600 MG Oral Tab Take 1 tablet (600 mg total) by mouth 2 (two) times daily.    simvastatin 40 MG Oral Tab Take 1 tablet (40 mg total) by mouth nightly.    metoprolol tartrate 50 MG Oral Tab Take 1 tablet (50 mg total) by mouth 2 (two) times daily.    sulfaSALAzine 500 MG Oral Tab Take 2 tablets (1,000 mg total) by mouth 2 (two) times daily.    leflunomide 20 MG Oral Tab Take 1 tablet (20 mg total) by mouth daily.    Vitamin D-Vitamin K (VITAMIN K2-VITAMIN D3 OR) Take by mouth.    Glucose Blood (TRUE METRIX BLOOD GLUCOSE TEST) In Vitro Strip 1 each by In Vitro route daily. Use to check blood sugar once daily    aspirin 81 MG Oral Tab EC Take 1 tablet (81 mg total) by mouth once.    LYSINE OR Take 1 tablet by mouth daily.    Certolizumab Pegol (CIMZIA PREFILLED) 2 X 200 MG/ML Subcutaneous Kit Inject 400 mg into the skin every 28 days.    Acetaminophen  MG Oral Tab CR Take 2 tablets (1,300 mg total) by mouth in the morning and 2 tablets (1,300 mg total) before bedtime.     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  Surgical History:  She  has a past surgical history that includes appendectomy (); angioplasty (coronary) ();  (, , ); cabg (); colonoscopy (2017); colonoscopy (N/A, 2017); laminectomy,lumbar (Left, 2019); back surgery (2019); cataract (Left, 2021); and decompression; unspecified (2022).   Family History:  Her family history includes 1 brother, 2 sisters. in an other family member; 2 sons, 1 daughter in an other family member; Asthma in her son; Cancer (age of onset: 80) in her maternal aunt; Heart Disease (age of onset: 72) in her father; Macular degeneration in her mother; Pancreatic Cancer in her maternal aunt; Stroke (age of onset: 52) in her sister;  age 97 old age in her mother; sciatica in her brother.  Social History:  She  reports that she quit smoking about 50 years ago. Her smoking use included cigarettes. She started smoking about 66 years ago. She has a 16 pack-year smoking history. She has been exposed to tobacco smoke. She has never used smokeless tobacco. She reports current alcohol use. She reports that she does not use drugs.    Tobacco:  She smoked tobacco in the past but quit greater than 12 months ago.  Social History     Tobacco Use   Smoking Status Former    Current packs/day: 0.00    Average packs/day: 1 pack/day for 16.0 years (16.0 ttl pk-yrs)    Types: Cigarettes    Start date: 1959    Quit date: 1975    Years since quittin.2    Passive exposure: Past   Smokeless Tobacco  Never        CAGE Alcohol Screen:   CAGE screening score of 0 on 3/2/2025, showing low risk of alcohol abuse.      Patient Care Team:  Lane Khan MD as PCP - General (Internal Medicine)  Gurinder Juarez DO (NEUROLOGY)  Jose Jernigan MD as Consulting Physician (NEUROSURGERY)  Atul Thakur (NEUROSURGERY)    Review of Systems   Constitutional:  Negative for activity change, chills and fever.   HENT:  Negative for ear discharge, nosebleeds, postnasal drip, rhinorrhea, sinus pressure and sore throat.    Eyes:  Negative for pain, discharge and redness.   Respiratory:  Negative for cough, chest tightness, shortness of breath and wheezing.    Cardiovascular:  Negative for chest pain, palpitations and leg swelling.   Gastrointestinal:  Negative for abdominal pain, blood in stool, constipation, diarrhea, nausea and vomiting.   Genitourinary:  Negative for difficulty urinating, dysuria, frequency, hematuria and urgency.   Musculoskeletal:  Positive for arthralgias, back pain and gait problem. Negative for joint swelling.   Skin:  Negative for rash.   Neurological:  Positive for numbness. Negative for syncope, weakness, light-headedness and headaches.   Psychiatric/Behavioral:  Negative for dysphoric mood. The patient is not nervous/anxious.          Objective:   Physical Exam  Constitutional:       Appearance: She is well-developed. She is not ill-appearing.   HENT:      Right Ear: Ear canal normal.      Left Ear: Ear canal normal.      Mouth/Throat:      Pharynx: Oropharynx is clear.   Eyes:      Extraocular Movements: Extraocular movements intact.      Conjunctiva/sclera: Conjunctivae normal.      Pupils: Pupils are equal, round, and reactive to light.   Cardiovascular:      Rate and Rhythm: Normal rate and regular rhythm.      Heart sounds: Normal heart sounds.   Pulmonary:      Effort: Pulmonary effort is normal.      Breath sounds: Normal breath sounds.   Abdominal:      General: Bowel sounds are  normal.      Palpations: Abdomen is soft.   Skin:     General: Skin is warm and dry.   Neurological:      Mental Status: She is alert.      Gait: Gait abnormal.   Psychiatric:         Mood and Affect: Mood normal.           /70   Pulse 80   Temp 97.3 °F (36.3 °C) (Temporal)   Ht 4' 10\" (1.473 m)   Wt 98 lb (44.5 kg)   BMI 20.48 kg/m²  Estimated body mass index is 20.48 kg/m² as calculated from the following:    Height as of this encounter: 4' 10\" (1.473 m).    Weight as of this encounter: 98 lb (44.5 kg).    Medicare Hearing Assessment:   Hearing Screening    Entry User: Sarah De La Rosa CMA  Screening Method: Questionnaire  I have a problem hearing over the telephone: No I have trouble following the conversations when two or more people are talking at the same time: No   I have trouble understanding things on the TV: No I have to strain to understand conversations: No   I have to worry about missing the telephone ring or doorbell: No I have trouble hearing conversations in a noisy background such as a crowded room or restaurant: No   I get confused about where sounds come from: No I misunderstand some words in a sentence and need to ask people to repeat themselves: No   I especially have trouble understanding the speech of women and children: No I have trouble understanding the speaker in a large room such as at a meeting or place of Scientologist: No   Many people I talk to seem to mumble (or don't speak clearly): No People get annoyed because I misunderstand what they say: No   I misunderstand what others are saying and make inappropriate responses: No I avoid social activities because I cannot hear well and fear I will reply improperly: No   Family members and friends have told me they think I may have hearing loss: No                 Assessment & Plan:   Nikki Mensah is a 79 year old female who presents for a Medicare Assessment.     (Z00.00) Medicare annual wellness visit, subsequent  (primary encounter  diagnosis)  Plan: Patient is independent with most  ADL.s    Health screening   Colonoscopy, mammography, dexa scan  And routine eye exam advised.      (E11.9) Diabetes mellitus type 2 without retinopathy (HCC)  Plan: Urinalysis with Culture Reflex, Lipid Panel,         Hemoglobin A1C, TSH and Free T4, Microalb/Creat        Ratio, Random Urine     HGBA1C:    Lab Results   Component Value Date    A1C 5.3 03/04/2025    A1C 5.4 03/26/2024    A1C 5.4 06/21/2023     03/04/2025     Controlled monitor    (Z12.31) Visit for screening mammogram  Plan: [facility] JODEE 2D+3D SCREENING BILAT         (CPT=77067/75918)       .Breast exam.  Bilateral  No discrete palpable masses or tenderness    No nipple discharge  And no axillary adenopathy.  Self breast exam advised      (M48.061) Neural foraminal stenosis of lumbar spine  Plan: chronicj  Paint mgt     (M06.9) Rheumatoid arthritis involving multiple sites, unspecified whether rheumatoid factor present (HCC)  Plan: chronic onogoing follow up with rhumatology    (Z95.1) S/P CABG x 3  Plan: Asymptomatic  monitor      (N18.31) Stage 3a chronic kidney disease (HCC)  Plan: chronic stable    (E78.2) Hyperlipidemia, mixed  Plan: Low cholesterol diet advised  Avoid saturated and trans fats       (M21.372) Left foot drop  Plan: chronic  High risk for fall   Fall precauton adivsed    (G57.02) Common peroneal neuropathy of left lower extremity  Plan: chronic  as above    (D64.9) Chronic anemia  Plan: chronic  no activ ebleeding    Medications and most recent test results reviewed  Refill medicaitons  as needed  Potential side effect discussed  Modification of risk for CAD advised    Dietary an lifestyle change  Pt voiced understanding and agrees with plan  Pt given time to ask questions  After Visit Summary handout    Discussed  And given to patient.        The patient indicates understanding of these issues and agrees to the plan.  Reinforced healthy diet, lifestyle, and exercise.      No  follow-ups on file.     Lane Khan MD, 3/7/2025     Supplementary Documentation:   General Health:  In the past six months, have you lost more than 10 pounds without trying?: (Patient-Rptd) 2 - No  Has your appetite been poor?: (Patient-Rptd) No  Type of Diet: (Patient-Rptd) Balanced  How does the patient maintain a good energy level?: (Patient-Rptd) Appropriate Exercise, Daily Walks  How would you describe your daily physical activity?: (Patient-Rptd) Moderate  How would you describe your current health state?: (Patient-Rptd) Good  How do you maintain positive mental well-being?: (Patient-Rptd) Social Interaction, Puzzles, Visiting Friends, Visiting Family  On a scale of 0 to 10, with 0 being no pain and 10 being severe pain, what is your pain level?: (Patient-Rptd) 1 - (Mild)  In the past six months, have you experienced urine leakage?: (Patient-Rptd) 1-Yes  At any time do you feel concerned for the safety/well-being of yourself and/or your children, in your home or elsewhere?: No  Have you had any immunizations at another office such as Influenza, Hepatitis B, Tetanus, or Pneumococcal?: (Patient-Rptd) No    Health Maintenance   Topic Date Due    Annual Well Visit  01/01/2025    Diabetes Care: Foot Exam (Annual)  01/01/2025    Diabetes Care Dilated Eye Exam  01/15/2025    COVID-19 Vaccine (8 - Pfizer risk 2024-25 season) 03/25/2025    Diabetes Care A1C  09/04/2025    Diabetes Care: GFR  03/04/2026    TB Screen  03/04/2026    Influenza Vaccine  Completed    DEXA Scan  Completed    Annual Depression Screening  Completed    Fall Risk Screening (Annual)  Completed    Diabetes Care: Microalb/Creat Ratio (Annual)  Completed    Pneumococcal Vaccine: 50+ Years  Completed    Zoster Vaccines  Completed    Meningococcal B Vaccine  Aged Out    Colorectal Cancer Screening  Discontinued    Mammogram  Discontinued

## 2025-03-21 RX ORDER — BLOOD SUGAR DIAGNOSTIC
1 STRIP MISCELLANEOUS DAILY
Refills: 2 | OUTPATIENT
Start: 2025-03-21

## 2025-03-21 RX ORDER — LANCING DEVICE
1 EACH MISCELLANEOUS DAILY
Refills: 0 | OUTPATIENT
Start: 2025-03-21

## 2025-03-21 RX ORDER — BLOOD-GLUCOSE CONTROL, HIGH
1 EACH MISCELLANEOUS AS DIRECTED
Refills: 2 | OUTPATIENT
Start: 2025-03-21

## 2025-03-21 RX ORDER — LANCING DEVICE
1 EACH MISCELLANEOUS DAILY
Refills: 2 | OUTPATIENT
Start: 2025-03-21

## 2025-03-24 ENCOUNTER — NURSE ONLY (OUTPATIENT)
Age: 80
End: 2025-03-24
Payer: COMMERCIAL

## 2025-03-24 DIAGNOSIS — M06.00 RHEUMATOID ARTHRITIS WITH NEGATIVE RHEUMATOID FACTOR, INVOLVING UNSPECIFIED SITE (HCC): Primary | ICD-10-CM

## 2025-03-24 NOTE — PROGRESS NOTES
Verified name and . Patient aware receiving Cimzia injection. Injections given bilateral lower abdomen. Patient tolerated well. Patient aware to follow up in 1 month for next injection.    Future Appointments   Date Time Provider Department Center   2025  2:00 PM  [facility] RN RHEUMATOLOGY [facility]   2025  2:50 PM Mariaj Hernandez MD [facility]   2025  2:00 PM [address]   3/10/2026  2:00 PM [facility]

## 2025-03-25 RX ORDER — LANCETS 33 GAUGE
1 EACH MISCELLANEOUS DAILY
Qty: 100 EACH | Refills: 3 | Status: SHIPPED | OUTPATIENT
Start: 2025-03-25

## 2025-03-25 RX ORDER — BLOOD-GLUCOSE CONTROL, NORMAL
1 EACH MISCELLANEOUS WEEKLY
Qty: 1 EACH | Refills: 3 | Status: SHIPPED | OUTPATIENT
Start: 2025-03-25

## 2025-03-25 RX ORDER — BLOOD SUGAR DIAGNOSTIC
1 STRIP MISCELLANEOUS DAILY
Qty: 100 STRIP | Refills: 3 | Status: SHIPPED | OUTPATIENT
Start: 2025-03-25

## 2025-03-26 ENCOUNTER — PATIENT MESSAGE (OUTPATIENT)
Dept: INTERNAL MEDICINE CLINIC | Facility: CLINIC | Age: 80
End: 2025-03-26

## 2025-04-21 ENCOUNTER — NURSE ONLY (OUTPATIENT)
Age: 80
End: 2025-04-21
Payer: COMMERCIAL

## 2025-04-21 DIAGNOSIS — M06.00 RHEUMATOID ARTHRITIS WITH NEGATIVE RHEUMATOID FACTOR, INVOLVING UNSPECIFIED SITE (HCC): Primary | ICD-10-CM

## 2025-04-21 PROCEDURE — 96372 THER/PROPH/DIAG INJ SC/IM: CPT | Performed by: INTERNAL MEDICINE

## 2025-04-21 NOTE — PROGRESS NOTES
Verified name and . Patient aware receiving Cimzia injection. Injections given in bilateral lower abdomen. Patient tolerated well. Patient aware to follow up in 1 month for next injection.    Future Appointments   Date Time Provider Department Center   2025 10:00 AM Carlie Barrett DPM [facility]   2025  2:50 PM Marija Hernandez MD [facility]   2025  2:00 PM [address]   3/10/2026  2:00 PM Lane [facility]

## 2025-05-02 RX ORDER — GABAPENTIN 600 MG/1
600 TABLET ORAL 2 TIMES DAILY
Qty: 200 TABLET | Refills: 2 | Status: SHIPPED | OUTPATIENT
Start: 2025-05-02

## 2025-05-12 ENCOUNTER — OFFICE VISIT (OUTPATIENT)
Dept: PODIATRY CLINIC | Facility: CLINIC | Age: 80
End: 2025-05-12
Payer: COMMERCIAL

## 2025-05-12 DIAGNOSIS — M79.672 LEFT FOOT PAIN: Primary | ICD-10-CM

## 2025-05-12 NOTE — PROGRESS NOTES
Reason for Visit      Nikki Mensah is a 79 year old female presents today complaining of left foot pain.     History of Present Illness     Patient presents to clinic today complaining of left foot pain.  Patient last saw podiatry in  for routine diabetic footcare as well as a left foot drop that she wears an AFO brace for.  Patient is a non-insulin-dependent diabetic type II who last saw her primary care doctor on 3/4/2025 for her annual wellness.  Patient's last A1c was 5.3 at that time.  Patient presents to clinic today complaining of what she feels like a ball on the plantar aspect of her left foot.  Patient states she notices it more barefoot than an issue.  Patient denies any trauma to the area.  Patient still has dropfoot on her left lower extremity but no longer wears AFO    The following portions of the patient's history were reviewed and updated as appropriate: allergies, current medications, past family history, past medical history, past social history, past surgical history and problem list.    Allergies[1]    Medications - Current[2]    There are no discontinued medications.    Problem List[3]    Past Medical History[4]    Past Surgical History[5]    Family History[6]    Social History     Occupational History    Occupation: previously worked in [employer] at [employer].   Tobacco Use    Smoking status: Former     Current packs/day: 0.00     Average packs/day: 1 pack/day for 16.0 years (16.0 ttl pk-yrs)     Types: Cigarettes     Start date: 1959     Quit date: 1975     Years since quittin.3     Passive exposure: Past    Smokeless tobacco: Never   Vaping Use    Vaping status: Never Used   Substance and Sexual Activity    Alcohol use: Yes     Alcohol/week: 0.0 standard drinks of alcohol     Comment: rarely    Drug use: No    Sexual activity: Not on file       ROS     Constitutional: negative for chills, fevers and sweats  Gastrointestinal: negative for abdominal pain, diarrhea, nausea  and vomiting  Genitourinary:negative for dysuria and hematuria  Musculoskeletal:negative for arthralgias and muscle weakness  Neurological: negative for paresthesia and weakness  All others reviewed and negative.      Physical Exam     LE PHYSICAL EXAM    Constitution: Well-developed and well-nourished. Gait appears normal. No apparent distress. Alert and oriented to person, place, and time.  Integument: There are no varicosities. Skin appears moist, warm, and supple with positive hair growth. There are no color changes. No open lesions. No macerations, No Hyperkeratotic lesions.  Vascular examination: Dorsalis pedis and posterior tibial pulses are strong bilaterally with capillary filling time less than 3 seconds to all digits. There is no peripheral edema..  Neurological Sensorium: Grossly intact to sharp/dull. Vibratory: Intact.  Musculoskeletal:   5/5 pedal muscle strength b/l       Mild tenderness and pain palpation to the left third intermetatarsal space.  No fluctuance drainage or ascending cellulitis.  No pain with dorsiflexion plantarflexion inversion eversion against resistance.  No pain to palpation along the metatarsal head.  No open lesions or calluses noted on the plantar aspect of left foot    Assessment and Plan     Encounter Diagnoses   Name Primary?    Left foot pain Yes   Discussed the etiology of a neuroma and its associated with an inflammatory response as well as compression of the adjacent metatarsal heads, elicited during examination  -Discussed conservative care such as metatarsal pads to decompress the intermetatarsal space, orthotics and steroid injections. The efficacy and risks of each treatment were explained in great detail.  -Discussed that if the symptoms do not resolve, would obtain an MRI to assess the impinged nerve and adjacent bone and soft tissue structures.  -Patient elected to proceed with Voltaren gel at this time.      Placed an order for an ultrasound at today's office  visit.  Discussed that it appears that she has a neuroma however we will rule out foreign body or other causes of pain in the ball of her foot    Patient was instructed to call the office or on-call podiatric physician immediately with any issues or concerns before the next scheduled visit. Patient to follow-up in clinic in after ultrasound      Carlie Stark DPM, D.CARLY, FACFAS  Diplomat, American Board of Foot and Ankle Surgery  Certified in Foot and Rearfoot/Ankle Reconstruction  Fellow of the American College of Foot and Ankle Surgeons  Fellowship Trained Foot and Ankle Surgeon   [facility]     5/12/2025    6:47 AM         [1] No Known Allergies  [2]   Current Outpatient Medications:     gabapentin 600 MG Oral Tab, Take 1 tablet (600 mg total) by mouth 2 (two) times daily., Disp: 200 tablet, Rfl: 2    Lancets (ONETOUCH DELICA PLUS [id number]) Does not apply Misc, 1 Lancet by Finger stick route daily., Disp: 100 each, Rfl: 3    Blood Glucose Calibration (ONETOUCH VERIO) In Vitro Liquid, 1 drop once a week. Control Tests should be performed: For practice to ensure your testing technique is good. Occasionally as you use a vial of test strips. When opening a new vial of test strips. If results seem unusually high or low. If a vial has been left opened or exposed to extreme heat or cold, or humidity. Whenever a check on performance of the system is needed. If meter damage is suspected., Disp: 1 each, Rfl: 3    Glucose Blood (ONETOUCH VERIO) In Vitro Strip, 1 strip by In Vitro route daily., Disp: 100 strip, Rfl: 3    hydroCHLOROthiazide 12.5 MG Oral Tab, Take 1 tablet (12.5 mg total) by mouth daily., Disp: 90 tablet, Rfl: 3    lisinopril 10 MG Oral Tab, Take 1 tablet (10 mg total) by mouth daily., Disp: 90 tablet, Rfl: 3    metFORMIN 500 MG Oral Tab, Take 1 tablet (500 mg total) by mouth daily with breakfast., Disp: 90 tablet, Rfl: 3    simvastatin 40 MG Oral Tab, Take 1 tablet (40 mg total) by  mouth nightly., Disp: 90 tablet, Rfl: 3    metoprolol tartrate 50 MG Oral Tab, Take 1 tablet (50 mg total) by mouth 2 (two) times daily., Disp: 200 tablet, Rfl: 2    sulfaSALAzine 500 MG Oral Tab, Take 2 tablets (1,000 mg total) by mouth 2 (two) times daily., Disp: 400 tablet, Rfl: 3    leflunomide 20 MG Oral Tab, Take 1 tablet (20 mg total) by mouth daily., Disp: 100 tablet, Rfl: 1    Vitamin D-Vitamin K (VITAMIN K2-VITAMIN D3 OR), Take by mouth., Disp: , Rfl:     Glucose Blood (TRUE METRIX BLOOD GLUCOSE TEST) In Vitro Strip, 1 each by In Vitro route daily. Use to check blood sugar once daily, Disp: 100 strip, Rfl: 3    aspirin 81 MG Oral Tab EC, Take 1 tablet (81 mg total) by mouth once., Disp: , Rfl:     LYSINE OR, Take 1 tablet by mouth daily., Disp: , Rfl:     Certolizumab Pegol (CIMZIA PREFILLED) 2 X 200 MG/ML Subcutaneous Kit, Inject 400 mg into the skin every 28 days., Disp: 1 kit, Rfl: 3    Acetaminophen  MG Oral Tab CR, Take 2 tablets (1,300 mg total) by mouth in the morning and 2 tablets (1,300 mg total) before bedtime., Disp: , Rfl: 1  [3]   Patient Active Problem List  Diagnosis    RA (rheumatoid arthritis) ([facility])    Diabetes mellitus type 2 without retinopathy ([facility])    S/P CABG x 3    Hyperlipidemia, mixed    Chronic anemia    Left foot drop    Neural foraminal stenosis of lumbar spine    Common peroneal neuropathy of left lower extremity    CKD (chronic kidney disease) stage 3, GFR 30-59 ml/min ([facility])   [4]   Past Medical History:   Amblyopia    R eye vision impaired since childhood    Anemia    mixed iron and B12 deficiency    CAD (coronary artery disease)    acute MI and had CABG in 3/2007     Cataracts, bilateral    OU; sees Dr. 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     Gallstone    Incidental finding 2.6 cm gallstone on x-ray lumbar spine 2/18/20.    Gallstones    incidental  on MRI lumbar    Humerus fracture    Fx L humerus--sling per Dr. Marks    Hyperlipidemia, mixed    Hypertension, essential    Lumbar disc disease    Osteoarthritis    Osteopenia    dexa 2008-osteopenia.     PONV (postoperative nausea and vomiting)    Preretinal hemorrhage of left eye    OS (not related to diabetes).     Recurrent cold sores    takes acyclovir prn (Dr Briones rx in about )    Rheumatoid arthritis (HCC)    sees Dr Trammell    Type 2 diabetes mellitus (HCC)    oral medication.    Vitamin B12 deficiency    Vitamin B12 level low at 126 on 17.   [5]   Past Surgical History:  Procedure Laterality Date    Angioplasty (coronary)      Appendectomy      Back surgery  2019     Re-exploration and resection herniated intervertebral disc, L4-L5 for recurrence. Dr. Carlito Hoover      , , 1982    x3    Cabg      Dr Sales    Cataract Left 2021    Colonoscopy  2017    hemorrhoids otherwise normal    Colonoscopy N/A 2017    Procedure: COLONOSCOPY;  Surgeon: Yg East MD;  Location: [facility] ENDOSCOPY    Decompression; unspecified  2022    L peroneal nerve decompression for foot drop. Dr Hathaway at [facility]    Laminectomy,lumbar Left 2019    Left L4-5 lumbar laminectomy Dr. Hoover    [6]   Family History  Problem Relation Age of Onset    Macular degeneration Mother     Other ( age 97 old age) Mother     Heart Disease Father 72        CAD; Cause of death    Stroke Sister 52        CVA    Other (sciatica) Brother     Asthma Son     Pancreatic Cancer Maternal Aunt     Cancer Maternal Aunt 80        pancreatic    Other (1 brother, 2 sisters.) Other     Other (2 sons, 1 daughter) Other     Bleeding Disorders Neg     Clotting Disorder Neg     Breast Cancer Neg     Ovarian Cancer Neg

## 2025-05-19 NOTE — TELEPHONE ENCOUNTER
LOV: 9/24/24  Future Appointments   Date Time Provider Department Center   5/20/2025  2:50 PM Marija Hernandez MD [facility]   8/4/2025  2:00 PM [facility] [place]   8/29/2025  2:00 PM [facility] RM5 [facility]   3/10/2026  2:00 PM Lane Khan MD [facility]     Labs: AST 25 ALT 18 3/4/25  Summary:  1. Cont.  Sulfasalazine 1000mg twice a day -     2. Cont.  lelfunomide  20mg a day -    3. Cont.  cimzi 400mg a month. -   4. Return to clinic in 6 months.   5. Labs today and then in 6 months.      Marija Hernandez MD  9/24/2024   3:15 PM

## 2025-05-20 ENCOUNTER — OFFICE VISIT (OUTPATIENT)
Age: 80
End: 2025-05-20
Payer: COMMERCIAL

## 2025-05-20 VITALS
DIASTOLIC BLOOD PRESSURE: 68 MMHG | RESPIRATION RATE: 16 BRPM | SYSTOLIC BLOOD PRESSURE: 148 MMHG | WEIGHT: 101.19 LBS | HEIGHT: 58 IN | HEART RATE: 86 BPM | BODY MASS INDEX: 21.24 KG/M2

## 2025-05-20 DIAGNOSIS — Z51.81 THERAPEUTIC DRUG MONITORING: ICD-10-CM

## 2025-05-20 DIAGNOSIS — M81.0 AGE-RELATED OSTEOPOROSIS WITHOUT CURRENT PATHOLOGICAL FRACTURE: ICD-10-CM

## 2025-05-20 DIAGNOSIS — M06.00 RHEUMATOID ARTHRITIS WITH NEGATIVE RHEUMATOID FACTOR, INVOLVING UNSPECIFIED SITE (HCC): Primary | ICD-10-CM

## 2025-05-20 PROCEDURE — 1160F RVW MEDS BY RX/DR IN RCRD: CPT | Performed by: INTERNAL MEDICINE

## 2025-05-20 PROCEDURE — 1159F MED LIST DOCD IN RCRD: CPT | Performed by: INTERNAL MEDICINE

## 2025-05-20 PROCEDURE — 3008F BODY MASS INDEX DOCD: CPT | Performed by: INTERNAL MEDICINE

## 2025-05-20 PROCEDURE — 3078F DIAST BP <80 MM HG: CPT | Performed by: INTERNAL MEDICINE

## 2025-05-20 PROCEDURE — 3077F SYST BP >= 140 MM HG: CPT | Performed by: INTERNAL MEDICINE

## 2025-05-20 PROCEDURE — 96372 THER/PROPH/DIAG INJ SC/IM: CPT | Performed by: INTERNAL MEDICINE

## 2025-05-20 PROCEDURE — 1125F AMNT PAIN NOTED PAIN PRSNT: CPT | Performed by: INTERNAL MEDICINE

## 2025-05-20 PROCEDURE — 99214 OFFICE O/P EST MOD 30 MIN: CPT | Performed by: INTERNAL MEDICINE

## 2025-05-20 RX ORDER — LEFLUNOMIDE 20 MG/1
20 TABLET ORAL DAILY
Qty: 100 TABLET | Refills: 2 | Status: SHIPPED | OUTPATIENT
Start: 2025-05-20

## 2025-05-20 NOTE — PROGRESS NOTES
Name and  verified. Pt aware She was to receive injection of Cimza. Injections given Bilateral Lower Abdomen subcutaneous and pt tolerated well. Possible local side effects discussed with pt. Pt aware to follow up in 4 weeks for next injection and to follow up with provider in 6 month after labs completed.

## 2025-05-20 NOTE — PROGRESS NOTES
Nikki Mensah is a 79 year old female.    HPI:     Chief Complaint   Patient presents with    Rheumatoid Arthritis    Medication Follow-Up    Lab Results    Finger Pain       I had the pleasure of seeing Nikki Mensah .   As you recall she is extremely pleasant 75-year-old who's a history of seronegative rheumatoid arthritis. Since then, she's been doing very well on  methotrexate 17.5 mg a week and sulfasalazine 500 mg once a day. She is doing well on lower the dose of her meds. She occl has wrist pain. She's not having a pain. She hasn't been walking. She's at the [facility] in the am.   She's going to a wedding in [place] and a cruise in jan.   Overall no interval illnesses.         7/23/2018  She's doing well. She's tolerating cimzia 400mg a months -   She's also on lelfunomide 20mg every day and ssz 1000mg tid.   She is not able to close her left hand completley - but not changed over the last 1 year. [facility] feels her pain is so much nile.r   Her right elbow also hurts.     10/29/2018  She is not having a flare. She does get 5/10 pain in her hands.   She doubled her leflunomide for 1 week while waiting for ssz - . D/w her not to do this in the future.   Her hands have the most issues.   She had her flu shot  No illnesses.   She doesn't feel like she can taper the ssz or lelfunomide yet. She still gets pains in her hands.     1/16/2019  She is doing well on cimiza 400mg a month, . She can't make a toatal fist with the left hand but it's getting better. She has 4/10 pain. She still has right wrist pain that's mild.   She feels better.   No coughs, no fevers.   She does pool work every day.     5/20/2019  She tripped 1 month ago - fractured her left2 humerus.   She's having left 1st toe.   She had sciatic in 2/2019 and 3/2019  - had two sx -    She ha sresidual  Left 1st toe numbness   She has lost stregnth in her left foot - she has a left foot drop   She has physical therapy for left leg.   Dr. Hoover , neurosx - - did her  back sx - before the sx - she did have some  problesm with the the left foot and toe - had a left foot drop  Sciatica pain is better  The RA is not bad. She feels it's mostly her arm being in a sling that she's not being able to do things.     8/26/2019  She had sciatica pian in 1/2019 0 and has had left foot drop since then. She is going to see if it gets better in a few years.   Her RA is under control.   Her left arm is healed now.   She is watching her walking and making sure she doesn't trip b/c of her left foot drop.   She finished PT for 4 months on left arm and foot.   She has 1/10 pain.   She is getting DEXA today.     1/22/2020   She is still trying to make a fist with her left hand- but almost but not quite. She doesn't have much pain. She has 3/10 in her left hand.   She still has difficulty walkign b/c of her brace. She had a lto of left nerve damage.   She does 2 miles a day. She still dose water aerobics every morning.     6/22/2020  She is oding well.   The arthriits was getting bad for a while b/c she skipped her march and April injeciton of cimzia. She restarted in may 2020.   She feels it the most in her right shoulder and right hand. Her pain is 6-7/10.   She feels moving her hand is really painful.   She doesn't noticed swelling.   She's had right shoulder pain since May or April.   The [facility] is opening but water aerobics - opening in 10/2020    1/25/2021  Her arthritis is not bad today. She hasn't gone back yet.   She's taking tyelnol 600mg for arthritis.   Her right shoulder is doing fine. Her right hand is doing real well. She can't make a fist with her right ahnd but not with her left hand.   She has about 3/10 pain.   She thinks her right shoulder pain was b/c she missed 3 months of cimzia shots.     7/21/2021  The summer is going quickly.   She feels pretty good. The left hand is always bothering her more than her right hand.   Her left wrist is tender.   She has 1/10 pain.   She is getting  her cimizia today.   Her right shoulder is ok now.   Her family is ok.   No side effects with covid vaccine.   She is forgetting  To take afternoon ssz - and she is takign 1000mg bid really.     12/28/2021   She is doing well, she has pain I her left 1-5th knuckles it's just started 1 week.   She has a little tenderness. She has 3/10 pain.   She has been on the medicare advantage plan since 1/2021 - so now it's $650 -     6/27/2022  She has a left drop foot and getting peroneal nerve sx at [facility] on 6/30.   She stopped taking some of her meds to prepare for the surgery. She stopped metoprolol so her bp is very high right now.   She called surgeons office about cimzia - told to hold 1 week before and 2 weeks after - so she is taking if on 7/14.   She's taking leflunomide 20mg a day and ssz 1000mg bid - this is ok.   Her pain is ok - occl left hand pain. Overall doing well.       1/13/2023  She has left common peroneal nerve decompiression and neuroplasty on 6/30/2022 - seh ahs hs xon chronic left foot drop that worsene.   She has more right hand pain - in he raplm and her right 4th finger. She gets a right 4th trigger ringer.   It's not bad today.   She has ano pain today.   She is mild soresns in her right 4th finger.   She overall is ok and stable on the cimzia , leflunomide and ssz .     8/9/2023   She still does her treatdmill daily   She gets a local nerve   She still does her water aerobics   She can move her left foot up more than last time - partialy better.   Her fingers doenst' bothe rher .   She can still makea  fist but her left one is better.   Her right 4th finger is better.     2/7/2024  Her right hand hurts the most. She can almost make a fist but it's harder than before.   Her left  hand hurts less.   The hands don't hurt all the time.   Sometimes her right 4th finger hurts at night - that's the worst of it.     She's still doing the water aerobics and treadmill.     9/24/2024  She has ongoing pain  in her right hand.   She notices that she has to pry her fingers open from holding dumbells while doing water aerobics. She has trouble opening her hand to release the dumbells.   Like trigger finger in her right 3rd and right 4th fingers  She hasn't gotten her flu shot or covid yet.   Overall she is doing well and doing her water aerobics    5/20/2025    She's is having a little joint pain.   She got her cimzia today - she feels it was only a little pain in her fingers b/c she wa due today.   She is doing her water aerobics   She has had no infections.   She occl gets trigger finger.     Her weight is stable - more stress - her  went into cardiac arrest in 12/18/2024 -   Luckily there was a cardiologist [place] and he was taken to [facility] and then [facility] - so she feels she lost weight around that time.   Her  is ok but a little more weak - had ministrokse as well.   - able to walk with walker - and he can care for himself. -   Her 2 sons live with her -     HISTORY:  Past Medical History:    Amblyopia    R eye vision impaired since childhood    Anemia    mixed iron and B12 deficiency    CAD (coronary artery disease)    acute MI and had CABG in 3/2007     Cataracts, bilateral    OU; sees Dr. 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     Gallstone    Incidental finding 2.6 cm gallstone on x-ray lumbar spine 2/18/20.    Gallstones    incidental on MRI lumbar    Humerus fracture    Fx L humerus--sling per Dr. Marks    Hyperlipidemia, mixed    Hypertension, essential    Lumbar disc disease    Osteoarthritis    Osteopenia    dexa 2008-osteopenia.     PONV (postoperative nausea and vomiting)    Preretinal hemorrhage of left eye    OS (not related to diabetes).     Recurrent cold sores    takes acyclovir prn (Dr Briones rx in about 2014)    Rheumatoid arthritis  ([facility])    sees Dr Trammell    Type 2 diabetes mellitus ([facility])    oral medication.    Vitamin B12 deficiency    Vitamin B12 level low at 126 on 7/28/17.      Social Hx Reviewed   Family Hx Reviewed     Medications (Active prior to today's visit):  Current Outpatient Medications   Medication Sig Dispense Refill    LEFLUNOMIDE 20 MG Oral Tab TAKE 1 TABLET BY MOUTH DAILY 100 tablet 2    gabapentin 600 MG Oral Tab Take 1 tablet (600 mg total) by mouth 2 (two) times daily. 200 tablet 2    Lancets (ONETOUCH DELICA PLUS [id number]) Does not apply Misc 1 Lancet by Finger stick route daily. 100 each 3    Blood Glucose Calibration (ONETOUCH VERIO) In Vitro Liquid 1 drop once a week. Control Tests should be performed: For practice to ensure your testing technique is good. Occasionally as you use a vial of test strips. When opening a new vial of test strips. If results seem unusually high or low. If a vial has been left opened or exposed to extreme heat or cold, or humidity. Whenever a check on performance of the system is needed. If meter damage is suspected. 1 each 3    Glucose Blood (ONETOUCH VERIO) In Vitro Strip 1 strip by In Vitro route daily. 100 strip 3    hydroCHLOROthiazide 12.5 MG Oral Tab Take 1 tablet (12.5 mg total) by mouth daily. 90 tablet 3    lisinopril 10 MG Oral Tab Take 1 tablet (10 mg total) by mouth daily. 90 tablet 3    metFORMIN 500 MG Oral Tab Take 1 tablet (500 mg total) by mouth daily with breakfast. 90 tablet 3    simvastatin 40 MG Oral Tab Take 1 tablet (40 mg total) by mouth nightly. 90 tablet 3    metoprolol tartrate 50 MG Oral Tab Take 1 tablet (50 mg total) by mouth 2 (two) times daily. 200 tablet 2    sulfaSALAzine 500 MG Oral Tab Take 2 tablets (1,000 mg total) by mouth 2 (two) times daily. 400 tablet 3    Vitamin D-Vitamin K (VITAMIN K2-VITAMIN D3 OR) Take by mouth.      Glucose Blood (TRUE METRIX BLOOD GLUCOSE TEST) In Vitro Strip 1 each by In Vitro route daily. Use to check blood sugar once  daily 100 strip 3    aspirin 81 MG Oral Tab EC Take 1 tablet (81 mg total) by mouth once.      LYSINE OR Take 1 tablet by mouth daily.      Certolizumab Pegol (CIMZIA PREFILLED) 2 X 200 MG/ML Subcutaneous Kit Inject 400 mg into the skin every 28 days. 1 kit 3    Acetaminophen  MG Oral Tab CR Take 2 tablets (1,300 mg total) by mouth in the morning and 2 tablets (1,300 mg total) before bedtime.  1       Allergies:  No Known Allergies      ROS:   All other ROS are negative.     PHYSICAL EXAM:   HEENT: Clear oropharynx, no oral ulcers, EOM intact, clear sclear, PERRLA, pleasant, no acute distress, no CAD, no neck tendnerness, good ROM,   No rashes  CVS: RRR, no murmurs  RS: CTAB, no crackles, no rhonchi  ABD: Soft No  Non jotnt pian   nontender, no HSM felt, BS positive  Joint exam:   right wrist and right shoudler not tender , decreased felxion   Left wrist not tende rwith flexion.   B/l thumbs not tender -   Mild tender in shoulders  But rom inatct   Left 3rd finger mild swelling  can't close left  hand completely, still hard fro her.but better  Can close right   Right elbow  contracture deformity - better  -   Left foot drop -  No heel pain   No right calf tendnerss.    No tendneress in mtps at this time/   No left hip tendneress.   Squeeze test negative -   No edema    Component      Latest Ref Rn 3/4/2025   Glucose      70 - 99 mg/dL 106 (H)    Sodium      136 - 145 mmol/L 142    Potassium      3.5 - 5.1 mmol/L 4.7    Chloride      98 - 112 mmol/L 109    Carbon Dioxide, Total      21.0 - 32.0 mmol/L 24.0    ANION GAP      0 - 18 mmol/L 9    BUN      9 - 23 mg/dL 25 (H)    CREATININE      0.55 - 1.02 mg/dL 1.03 (H)    BUN/CREATININE RATIO      10.0 - 20.0  24.3 (H)    CALCIUM      8.7 - 10.4 mg/dL 9.2    CALCULATED OSMOLALITY      275 - 295 mOsm/kg 299 (H)    EGFR      >=60 mL/min/1.73m2 56 (L)    ALT (SGPT)      10 - 49 U/L 18    AST (SGOT)      <34 U/L 25    ALKALINE PHOSPHATASE      55 - 142 U/L 53 (L)     Total Bilirubin      0.2 - 1.1 mg/dL 0.3    PROTEIN, TOTAL      5.7 - 8.2 g/dL 7.5    Albumin      3.2 - 4.8 g/dL 4.5    Globulin      2.0 - 3.5 g/dL 3.0    A/G Ratio      1.0 - 2.0  1.5    Patient Fasting for CMP? No    Color Urine      Yellow  Yellow    Clarity Urine      Clear  Clear    Spec Gravity      1.005 - 1.030  1.026    Glucose Urine      Normal mg/dL Normal    Bilirubin Urine      Negative  Negative    Ketones, UA      Negative mg/dL Negative    Blood Urine      Negative  Negative    PH Urine      5.0 - 8.0  5.0    Protein Urine      Negative mg/dL 30 !    Urobilinogen Urine      Normal  Normal    Nitrite Urine      Negative  Negative    Leukocyte Esterase       Negative  25 !    WBC Urine      0 - 5 /HPF 1-5    RBC Urine      0 - 2 /HPF 0-2    Bacteria Urine      None Seen /HPF None Seen    SQUAM EPI CELLS UR      None Seen /HPF Few !    RENAL TUBULAR EPITHELIAL CELLS      None Seen /HPF None Seen    TRANSITIONAL EPI CELLS      None Seen /HPF None Seen    YEAST URINE      None Seen /HPF None Seen    WBC      4.0 - 11.0 x10(3) uL 9.6    RBC      3.80 - 5.30 x10(6)uL 3.44 (L)    Hemoglobin      12.0 - 16.0 g/dL 10.6 (L)    Hematocrit      35.0 - 48.0 % 34.4 (L)    MCV      80.0 - 100.0 fL 100.0    MCH      26.0 - 34.0 pg 30.8    MCHC      31.0 - 37.0 g/dL 30.8 (L)    RDW      11.0 - 15.0 % 13.2    RDW-SD      35.1 - 46.3 fL 48.0 (H)    Platelet Count      150.0 - 450.0 10(3)uL 302.0    Cholesterol, Total      <200 mg/dL 191    HDL Cholesterol      40 - 59 mg/dL 57    Triglycerides      30 - 149 mg/dL 201 (H)    LDL Cholesterol Calc      <100 mg/dL 100 (H)    VLDL      0 - 30 mg/dL 34 (H)    NON-HDL CHOLESTEROL      <130 mg/dL 134 (H)    Patient Fasting for Lipid? No    Quantiferon TB NIL      IU/mL 0.02    Quantiferon-TB1 Minus NIL      IU/mL 0.02    Quantiferon-TB2 Minus NIL      IU/mL 0.02    Quantiferon TB Mitogen minus NIL      IU/mL >10.00    Quantiferon TB Result      Negative  Negative    MALB  URINE      mg/dL 15.70    CREATININE UR RANDOM      mg/dL 106.20    MALB/CRE CALC      <=30.0 ug/mg 147.8 (H)    HEMOGLOBIN A1c      <5.7 % 5.3    ESTIMATED AVERAGE GLUCOSE      68 - 126 mg/dL 105    T4,Free (Direct)      0.8 - 1.7 ng/dL 1.0    TSH      0.550 - 4.780 uIU/mL 2.141    SED RATE      0 - 30 mm/Hr 17    C-REACTIVE PROTEIN      <1.00 mg/dL <0.40    VITAMIN D, 25-OH, TOTAL      30.0 - 100.0 ng/mL 66.8       Legend:  (H) High  (L) Low  ! Abnormal  3/23/2019 mri lumbar spine     Large left paracentral disc protrusion at L4-L5 with effacement of the left lateral and subarticular recesses and mass effect upon the left L4 nerve root is new since 2/4/2019.     Extension of the disc protrusion into the left L4-L5 neural foramen with severe narrowing of the left neural foramen.     Postoperative changes of a left L4-L5 laminotomy.     Enhancement of the left L4 and L5 nerve roots may be secondary to neuritis given recent intervention.    1/16/2019 - dexa  LEFT FEMORAL NECK               BMD:    0.719 gm/sq. cm.            T SCORE:         -1.2               PA LUMBAR SPINE (L1 - L4)               BMD:    0.872 gm/sq. cm.            T SCORE:         -1.6            2/14/2022 - DEXA    LEFT FEMORAL NECK   BMD: 0.727 gm/sq. cm. T SCORE: -1.1 Z SCORE: 1.0       LEFT TOTAL HIP   BMD: 0.931 gm/sq. cm. T SCORE: -0.1 Z SCORE: 1.7       PA LUMBAR SPINE (L1 - L4)   BMD: 0.981 gm/sq. cm. T SCORE: -0.6 Z SCORE: 1.8   ASSESSMENT/PLAN:     1. RA (rheumatoid arthritis) (HCC)  . Seronegative rheumatoid arthritis -   - stable , in remission - gradual changes have happenin her hands    cont. on lelfunomide 20mg a day and ssz 1000mg bid  Cont. cimzia 400mg - started  June 2018 -   Check labs in 6months   - return to clinic in 6 months.     Mostly her function issues is her left foot driop from back sx -    -switched to leflunomide 10mg a day on 3/2016 - from methotrexate 15mg a week   - stopped leflunomide in beginning of 3/2017 -  b/c of expense but went back on b/c of her pain being bad   3/2018 - quantiferon gold and hep b and c studies. ,    - cont. h2o aerobics when it reopens   She wants to switch to  Sulfasalazine b/c of insurance coverage - she will switch in march 2017    2.  History of right eye visual loss - does not want to be on Plaquenil, f/u with optho, gets eye exam end of the year - but also gets it for her diabetes   4.  CAD status post CABG a left leg venous grafting-stable   5.  Diabetes type 2-controlled, continue meds  6. Anemia - EDWIN - egd/cscope ok - f/u hematology - but could also be from chronic disease -   7. Osteopenia - 2008 - with hx of RA - makes her high risk for osteoporosiss - given RA activity -  1/2019 -   FRAX  Score   The ten year probability of fracture (%)without BM  Major osteoporosis  19%  Hip fracture 7.7  Check DEXA  - due for dexa in 2/2024 -     8. Left leg sciatica s/p 2 back sx with persistent left foot drop - done by dr. Hoover   - pain is better   Causing her to trip   - gababapnetin  9. 6/29/2021- cataract in right eye removed, 1/2021 - had left cataract removed.   10.s/p left common peroneal nerve decompression and neuroplasty on 6/30/2022- she will repeat emg in 7/2023 - and wait a litlte longer -not bettery yet.   No left leg pain       Summary:  1. Cont.  Sulfasalazine 1000mg twice a day -     2. Cont.  lelfunomide  20mg a day -    3. Cont.  cimzi 400mg a month. -   4. Return to clinic in 6 months.   5. Labs today and then in 6 months.      Marija Hernandez MD  5/20/2025   3:36 PM           is applicable because the patient's medical record notes reflects the ongoing nature of the continuous longitudinal relationship of care, and the medical record indicates that there is ongoing treatment of a serious/complex medical condition.

## 2025-06-17 ENCOUNTER — NURSE ONLY (OUTPATIENT)
Age: 80
End: 2025-06-17
Payer: COMMERCIAL

## 2025-06-17 DIAGNOSIS — M06.00 RHEUMATOID ARTHRITIS WITH NEGATIVE RHEUMATOID FACTOR, INVOLVING UNSPECIFIED SITE (HCC): Primary | ICD-10-CM

## 2025-06-17 PROCEDURE — 96372 THER/PROPH/DIAG INJ SC/IM: CPT | Performed by: INTERNAL MEDICINE

## 2025-06-17 NOTE — PROGRESS NOTES
Verified name and . Patient aware receiving Cimzia injection. Injections given bilateral lower abdomen. Patient tolerated well. Patient aware to follow up in 1 month for next injection.    Future Appointments   Date Time Provider Department Center   2025  2:40 PM Jessica Mayberry MD [facility]

## 2025-07-21 ENCOUNTER — NURSE ONLY (OUTPATIENT)
Age: 80
End: 2025-07-21
Payer: COMMERCIAL

## 2025-07-21 DIAGNOSIS — M06.00 RHEUMATOID ARTHRITIS WITH NEGATIVE RHEUMATOID FACTOR, INVOLVING UNSPECIFIED SITE (HCC): Primary | ICD-10-CM

## 2025-07-21 NOTE — PROGRESS NOTES
Verified name and . Patient aware receiving Cimzia injection. Injections given in bilateral lower abdomen. Patient tolerated well. Patient aware to follow up in 1 month for next injection.    Future Appointments   Date Time Provider Department [place]   2025  2:00 PM [facility]   2025  2:00 PM [facility] RHEUMATOLOGY [facility]   2025  2:00 PM [address]   2025  2:40 PM Jessica Mayberry MD [facility]   3/10/2026  2:00 PM Lane Khan [facility]

## 2025-08-04 ENCOUNTER — HOSPITAL ENCOUNTER (OUTPATIENT)
Dept: ULTRASOUND IMAGING | Facility: HOSPITAL | Age: 80
Discharge: HOME | End: 2025-08-04
Attending: PODIATRIST

## 2025-08-04 DIAGNOSIS — M79.672 LEFT FOOT PAIN: ICD-10-CM

## 2025-08-04 PROCEDURE — 76881 US COMPL JOINT R-T W/IMG: CPT | Performed by: PODIATRIST

## 2025-08-20 ENCOUNTER — NURSE ONLY (OUTPATIENT)
Age: 80
End: 2025-08-20

## 2025-08-20 DIAGNOSIS — M06.00 RHEUMATOID ARTHRITIS WITH NEGATIVE RHEUMATOID FACTOR, INVOLVING UNSPECIFIED SITE (HCC): Primary | ICD-10-CM

## 2025-08-20 PROCEDURE — 96372 THER/PROPH/DIAG INJ SC/IM: CPT | Performed by: INTERNAL MEDICINE

## 2025-08-29 ENCOUNTER — HOSPITAL ENCOUNTER (OUTPATIENT)
Dept: MAMMOGRAPHY | Facility: HOSPITAL | Age: 80
Discharge: HOME | End: 2025-08-29
Attending: INTERNAL MEDICINE

## 2025-08-29 DIAGNOSIS — Z12.31 VISIT FOR SCREENING MAMMOGRAM: ICD-10-CM

## 2025-08-29 PROCEDURE — 77067 SCR MAMMO BI INCL CAD: CPT | Performed by: INTERNAL MEDICINE

## 2025-08-29 PROCEDURE — 77063 BREAST TOMOSYNTHESIS BI: CPT | Performed by: INTERNAL MEDICINE

## (undated) DEVICE — ENDOSCOPY PACK UPPER: Brand: MEDLINE INDUSTRIES, INC.

## (undated) DEVICE — DERMABOND LIQUID ADHESIVE

## (undated) DEVICE — CAPSULE ENDO PILL CAM SB3

## (undated) DEVICE — FORCEP RADIAL JAW 4

## (undated) DEVICE — SOL  .9 1000ML BTL

## (undated) DEVICE — GAMMEX® PI HYBRID SIZE 8, STERILE POWDER-FREE SURGICAL GLOVE, POLYISOPRENE AND NEOPRENE BLEND: Brand: GAMMEX

## (undated) DEVICE — CAUTERY BLADE 2IN INS E1455

## (undated) DEVICE — DRAPE C-ARM UNIVERSAL

## (undated) DEVICE — NEEDLE HPO 18GA 1.5IN ECLPS

## (undated) DEVICE — FLOSEAL SEALENT STERILE 10ML

## (undated) DEVICE — INSULATED BLADE ELECTRODE 6.5

## (undated) DEVICE — COVER PSTN BW FRM SKN CR KT

## (undated) DEVICE — SUCTION TIP FRAZIER 10FR #3

## (undated) DEVICE — UNDYED BRAIDED (POLYGLACTIN 910), SYNTHETIC ABSORBABLE SUTURE: Brand: COATED VICRYL

## (undated) DEVICE — LAMINECTOMY: Brand: MEDLINE INDUSTRIES, INC.

## (undated) DEVICE — SUTURE VICRYL 3-0 RB-1

## (undated) DEVICE — ENDOSCOPY PACK - LOWER: Brand: MEDLINE INDUSTRIES, INC.

## (undated) DEVICE — DRAPE SRG 150X54IN LEICA

## (undated) DEVICE — PRECISION MATCH HEAD

## (undated) DEVICE — SUTURE VICRYL 0 CT-1

## (undated) DEVICE — Device: Brand: DEFENDO AIR/WATER/SUCTION AND BIOPSY VALVE

## (undated) DEVICE — SUTURE MONOCRYL 4-0 PS-2

## (undated) NOTE — MR AVS SNAPSHOT
[facility]  [address] [place] [address], [address] [place] [address]  Leeannapino Chavez               Thank you for choosing us for your health care visit with Kim Rosado MD.  We are glad to serve you and happy to provide you with this summary hydrochlorothiazide 12.5 MG Tabs   Take 1 tablet by mouth  daily   Commonly known as:  HYDRODIURIL           MetFORMIN HCl 500 MG Tabs   Take 2 tablet PO in the am and 3 tablet in the evening   Commonly known as:  GLUCOPHAGE           Metoprolol Ta Tips for increasing your physical activity – Adults who are physically active are less likely to develop some chronic diseases than adults who are inactive.      HOW TO GET STARTED: HOW TO STAY MOTIVATED:   Start activities slowly and build up over time Do

## (undated) NOTE — MR AVS SNAPSHOT
[facility] [place]  [address] [facility] [address]  [phone]               Thank you for choosing us for your health care visit with Wilmer Aleman MD.  We are glad to serve you and happy to provide you with this summary of your visit.   [facility]ae Medical Issues Discussed Today     Diabetes mellitus type 2 without retinopathy    Essential hypertension    Hyperlipidemia, mixed    RA (rheumatoid arthritis)    S/P CABG x 3    Encounter to establish care    -  Primary    Intertrigo          Instructions Commonly known as:  AZULFIDINE                Where to Get Your Medications      These medications were sent to [address], [address] [phone], [phone]  [address] [place] [address]

## (undated) NOTE — LETTER
4/29/2021              Patricia Welch        [facility] [address]         Dear Sophia Ivan,      The report of the Biopsy done on 4/27/21 shows a Benign Inflamed Keratosis.   This is a benign (not cancerous) growth, and requires no further anthony